# Patient Record
Sex: FEMALE | Race: OTHER | Employment: OTHER | URBAN - METROPOLITAN AREA
[De-identification: names, ages, dates, MRNs, and addresses within clinical notes are randomized per-mention and may not be internally consistent; named-entity substitution may affect disease eponyms.]

---

## 2017-01-10 RX ORDER — SULFAMETHOXAZOLE AND TRIMETHOPRIM 800; 160 MG/1; MG/1
1 TABLET ORAL
Start: 2017-01-11

## 2017-01-10 RX ORDER — HYDROCHLOROTHIAZIDE 12.5 MG/1
TABLET ORAL
Qty: 30 TABLET | Refills: 0 | OUTPATIENT
Start: 2017-01-10

## 2017-01-10 RX ORDER — ACYCLOVIR 400 MG/1
400 TABLET ORAL 2 TIMES DAILY
Start: 2017-01-10

## 2017-01-10 RX ORDER — FAMOTIDINE 20 MG/1
TABLET ORAL
Qty: 60 TABLET | Refills: 0 | OUTPATIENT
Start: 2017-01-10

## 2017-01-10 NOTE — TELEPHONE ENCOUNTER
Call placed to [facility] re refill request for acyclovir and bactrim that patient is under the care of medical oncologist Nicola Madison at [facility]. Refill requests should be directed to her please.  Confirms they will get in touch with Dr Zoya Guillermo

## 2017-01-11 ENCOUNTER — TELEPHONE (OUTPATIENT)
Dept: INTERNAL MEDICINE CLINIC | Facility: CLINIC | Age: 72
End: 2017-01-11

## 2017-01-11 NOTE — TELEPHONE ENCOUNTER
[facility] requesting refill.   Lyrica 50mg #60

## 2017-05-27 RX ORDER — FLUTICASONE PROPIONATE 50 MCG
SPRAY, SUSPENSION (ML) NASAL
Qty: 1 BOTTLE | Refills: 2 | Status: SHIPPED | OUTPATIENT
Start: 2017-05-27

## 2017-05-27 NOTE — TELEPHONE ENCOUNTER
Refill Protocol Appointment Criteria: Refilled per protocol    · Appointment scheduled in the past 12 months or in the next 3 months  Recent Visits       Provider Department Primary Dx    8 months ago Cristal Ambrosio MD [facility], [facility], [address] [facility] [place]

## 2017-06-27 NOTE — TELEPHONE ENCOUNTER
Refill Protocol Appointment Criteria: Refill protocol failed because the patient did not meet the protocol criteria.  Please advise in regards to refill request     · Appointment scheduled in the past 6 months or in the next 3 months  Recent Outpatient Visi

## 2017-07-07 RX ORDER — LISINOPRIL 20 MG/1
TABLET ORAL
Qty: 30 TABLET | Refills: 0 | Status: SHIPPED | OUTPATIENT
Start: 2017-07-07

## 2017-11-06 ENCOUNTER — MED REC SCAN ONLY (OUTPATIENT)
Dept: HEMATOLOGY/ONCOLOGY | Facility: HOSPITAL | Age: 72
End: 2017-11-06

## 2018-11-05 NOTE — LETTER
AUTHORIZATION FOR SURGICAL OPERATION OR OTHER PROCEDURE    1. I hereby authorize Dr. Alex Behar and the [facility] staff assigned to my case to perform the following operation and/or procedure at the [facility]:    Right subacromial corticosteroid injection     2.  My physician has explained the nature and purpose of the operation or other procedure, possible alternative methods of treatment, the risks involved, and the possibility of complication to me.  I acknowledge that no guarantee has been made as to the result that may be obtained.  3.  I recognize that, during the course of this operation, or other procedure, unforseen conditions may necessitate additional or different procedure than those listed above.  I, therefore, further authorize and request that the above named physician, his/her physician assistants or designees perform such procedures as are, in his/her professional opinion, necessary and desirable.  4.  Any tissue or organs removed in the operation or other procedure may be disposed of by and at the discretion of the [facility] staff and [facility].  5.  I understand that in the event of a medical emergency, I will be transported by local paramedics to [facility] or other hospital emergency department.  6.  I certify that I have read and fully understand the above consent to operation and/or other procedure.    7.  I acknowledge that my physician has explained sedation/analgesia administration to me including the risks and benefits.  I consent to the administration of sedation/analgesia as may be necessary or desirable in the judgement of my physician.    Witness signature: ___________________________________________________ Date:  ______/______/_____                    Time:  ________ A.M.  P.M.       Patient Name:  Lizbeth Matute  6/4/1945  [id number]         Patient signature:   ___________________________________________________                 Statement of Physician  My signature below affirms that prior to the time of the procedure, I have explained to the patient and/or his/her guardian, the risks and benefits involved in the proposed treatment and any reasonable alternative to the proposed treatment.  I have also explained the risks and benefits involved in the refusal of the proposed treatment and have answered the patient's questions.                        Date:  ______/______/_______  Provider                      Signature:  __________________________________________________________       Time:  ___________ AAMANDEEP CHAU        chlorhexidine chlorhexidine chlorhexidine chlorhexidine

## 2018-11-27 ENCOUNTER — HOSPITAL ENCOUNTER (EMERGENCY)
Facility: HOSPITAL | Age: 73
Discharge: HOME | End: 2018-11-27
Payer: MEDICARE

## 2018-11-27 ENCOUNTER — APPOINTMENT (OUTPATIENT)
Dept: CT IMAGING | Facility: HOSPITAL | Age: 73
End: 2018-11-27
Attending: NURSE PRACTITIONER
Payer: MEDICARE

## 2018-11-27 VITALS
BODY MASS INDEX: 23.41 KG/M2 | SYSTOLIC BLOOD PRESSURE: 156 MMHG | WEIGHT: 124 LBS | HEIGHT: 61 IN | HEART RATE: 54 BPM | DIASTOLIC BLOOD PRESSURE: 60 MMHG | OXYGEN SATURATION: 100 % | RESPIRATION RATE: 18 BRPM

## 2018-11-27 DIAGNOSIS — M54.89 BACK PAIN WITHOUT SCIATICA: Primary | ICD-10-CM

## 2018-11-27 PROCEDURE — 81001 URINALYSIS AUTO W/SCOPE: CPT | Performed by: NURSE PRACTITIONER

## 2018-11-27 PROCEDURE — 99285 EMERGENCY DEPT VISIT HI MDM: CPT

## 2018-11-27 PROCEDURE — 74176 CT ABD & PELVIS W/O CONTRAST: CPT | Performed by: NURSE PRACTITIONER

## 2018-11-27 RX ORDER — TRAMADOL HYDROCHLORIDE 50 MG/1
TABLET ORAL EVERY 4 HOURS PRN
Qty: 10 TABLET | Refills: 0 | Status: SHIPPED | OUTPATIENT
Start: 2018-11-27 | End: 2018-12-04

## 2018-11-27 RX ORDER — TRAMADOL HYDROCHLORIDE 50 MG/1
50 TABLET ORAL ONCE
Status: COMPLETED | OUTPATIENT
Start: 2018-11-27 | End: 2018-11-27

## 2018-11-27 NOTE — ED PROVIDER NOTES
Patient Seen in: [facility] AND [facility] Emergency Department    History   Patient presents with:  Back Pain (musculoskeletal)    Stated Complaint: back pain     HPI    Chief complaint: Back pain    History of present illness:   The patient complains of back pain mg by mouth 2 (two) times daily. Sulfamethoxazole-TMP -160 MG Oral Tab per tablet,  Take 1 tablet by mouth 3 (three) times a week.    Ondansetron HCl (ZOFRAN) 8 MG tablet,  Take 1 tablet (8 mg total) by mouth every 8 (eight) hours as needed for Naus 23.43 kg/m²     PULSE % on RA     General: Patient appears in mild distress, slow to transition  Neck: Supple, full range of motion, no midline bony tenderness  Abdomen: Soft nontender nondistended, no mass or prominent aortic pulsation  Back: NO Ten provider.           Present on Admission:  **None**

## 2018-12-28 ENCOUNTER — APPOINTMENT (OUTPATIENT)
Dept: GENERAL RADIOLOGY | Facility: HOSPITAL | Age: 73
End: 2018-12-28
Attending: PHYSICIAN ASSISTANT
Payer: MEDICARE

## 2018-12-28 ENCOUNTER — HOSPITAL ENCOUNTER (EMERGENCY)
Facility: HOSPITAL | Age: 73
Discharge: HOME | End: 2018-12-28
Attending: PHYSICIAN ASSISTANT
Payer: MEDICARE

## 2018-12-28 VITALS
RESPIRATION RATE: 14 BRPM | HEART RATE: 61 BPM | DIASTOLIC BLOOD PRESSURE: 51 MMHG | TEMPERATURE: 98 F | OXYGEN SATURATION: 98 % | SYSTOLIC BLOOD PRESSURE: 132 MMHG

## 2018-12-28 DIAGNOSIS — S62.654A CLOSED NONDISPLACED FRACTURE OF MIDDLE PHALANX OF RIGHT RING FINGER, INITIAL ENCOUNTER: Primary | ICD-10-CM

## 2018-12-28 PROCEDURE — 26720 TREAT FINGER FRACTURE EACH: CPT

## 2018-12-28 PROCEDURE — 99283 EMERGENCY DEPT VISIT LOW MDM: CPT

## 2018-12-28 PROCEDURE — 73130 X-RAY EXAM OF HAND: CPT | Performed by: PHYSICIAN ASSISTANT

## 2018-12-29 NOTE — ED PROVIDER NOTES
Patient Seen in: [facility] AND [facility] Emergency Department    History   Patient presents with:  Fall (musculoskeletal, neurologic)    Stated Complaint: fall, finger injury     HPI    HPI: Pinky Romo is a 68year old female who presents with chief compl 1 tablet by mouth 3 (three) times a week. Ondansetron HCl (ZOFRAN) 8 MG tablet,  Take 1 tablet (8 mg total) by mouth every 8 (eight) hours as needed for Nausea. Clopidogrel Bisulfate 75 MG Oral Tab,  Take 75 mg by mouth daily.    Multiple Vitamins- well-nourished. Mild discomfort. Psychological: Alert, No abnormalities of mood, affect. Head: Normocephalic/atraumatic. Nontender. Eyes: Pupils are equal round reactive to light. Conjunctiva are within normal limits. ENT: Oropharynx is clear.   Neck (gsf=[id number])    Result Date: 12/28/2018  CONCLUSION:  1. Nondisplaced fracture dorsal medial middle phalanx at PIP joint of ring finger. 2. Mild degenerative change of the triscaphe joint and DIP  joint small finger.  3. Calcium prior phosphate dihydrate dep

## 2020-02-05 ENCOUNTER — TELEPHONE (OUTPATIENT)
Dept: GASTROENTEROLOGY | Facility: CLINIC | Age: 75
End: 2020-02-05

## 2020-02-05 NOTE — TELEPHONE ENCOUNTER
----- Message from Cori Burt RN sent at 2/18/2016  1:44 PM CST -----  Regarding: colon recall  Recall colon for 5 years per PL.  Colon done 3/5/15

## 2020-06-27 ENCOUNTER — HOSPITAL ENCOUNTER (OUTPATIENT)
Age: 75
Discharge: HOME | End: 2020-06-27
Attending: EMERGENCY MEDICINE
Payer: MEDICARE

## 2020-06-27 VITALS
RESPIRATION RATE: 20 BRPM | SYSTOLIC BLOOD PRESSURE: 150 MMHG | HEART RATE: 66 BPM | TEMPERATURE: 98 F | OXYGEN SATURATION: 98 % | DIASTOLIC BLOOD PRESSURE: 54 MMHG

## 2020-06-27 DIAGNOSIS — M54.42 ACUTE BILATERAL LOW BACK PAIN WITH LEFT-SIDED SCIATICA: Primary | ICD-10-CM

## 2020-06-27 PROCEDURE — 99214 OFFICE O/P EST MOD 30 MIN: CPT

## 2020-06-27 PROCEDURE — 99213 OFFICE O/P EST LOW 20 MIN: CPT

## 2020-06-27 RX ORDER — METHYLPREDNISOLONE 4 MG/1
TABLET ORAL
Qty: 1 PACKAGE | Refills: 0 | Status: SHIPPED | OUTPATIENT
Start: 2020-06-27 | End: 2020-10-28

## 2020-06-27 NOTE — ED PROVIDER NOTES
Patient Seen in: [address]      History   Patient presents with:  Back Pain    Stated Complaint: left leg pain    HPI    The patient is a 80-year-old female with past history of hypertension, previous back problems who p Alcohol/week: 0.0 standard drinks    Drug use: No             Review of Systems    Positive for stated complaint: left leg pain  Other systems are as noted in HPI. Constitutional and vital signs reviewed.       All other systems reviewed and negative exc Abrahan Ramirez MD  [address] [facility]  [phone]      For further back evaluation.   Call Monday for an appointment          Medications Prescribed:  Current Discharge Medication List    START taking these medications    methylPRE

## 2020-06-27 NOTE — ED INITIAL ASSESSMENT (HPI)
Left lower back pain for 3 weeks radiating down left buttocks and down left leg. Denies trauma. Daughter with patient states she lives alone and falls frequently. Intermittent dizziness. No dizziness now. No chest pain or SOB.

## 2020-10-28 ENCOUNTER — OFFICE VISIT (OUTPATIENT)
Dept: NEUROLOGY | Facility: CLINIC | Age: 75
End: 2020-10-28
Payer: MEDICARE

## 2020-10-28 VITALS — WEIGHT: 124 LBS | HEIGHT: 61 IN | BODY MASS INDEX: 23.41 KG/M2

## 2020-10-28 DIAGNOSIS — M47.816 FACET SYNDROME, LUMBAR: ICD-10-CM

## 2020-10-28 DIAGNOSIS — M47.816 LUMBAR SPONDYLOSIS: ICD-10-CM

## 2020-10-28 DIAGNOSIS — I10 ESSENTIAL HYPERTENSION: ICD-10-CM

## 2020-10-28 DIAGNOSIS — E78.5 HYPERLIPIDEMIA, UNSPECIFIED HYPERLIPIDEMIA TYPE: ICD-10-CM

## 2020-10-28 DIAGNOSIS — M54.59 LUMBAR TRIGGER POINT SYNDROME: ICD-10-CM

## 2020-10-28 DIAGNOSIS — M48.061 LUMBAR FORAMINAL STENOSIS: ICD-10-CM

## 2020-10-28 DIAGNOSIS — M54.59 MECHANICAL LOW BACK PAIN: ICD-10-CM

## 2020-10-28 DIAGNOSIS — M48.062 SPINAL STENOSIS OF LUMBAR REGION WITH NEUROGENIC CLAUDICATION: ICD-10-CM

## 2020-10-28 DIAGNOSIS — M79.10 MYALGIA: Primary | ICD-10-CM

## 2020-10-28 DIAGNOSIS — M51.16 LUMBAR DISC HERNIATION WITH RADICULOPATHY: ICD-10-CM

## 2020-10-28 DIAGNOSIS — M51.26 BULGE OF LUMBAR DISC WITHOUT MYELOPATHY: ICD-10-CM

## 2020-10-28 DIAGNOSIS — M51.37 DDD (DEGENERATIVE DISC DISEASE), LUMBOSACRAL: ICD-10-CM

## 2020-10-28 PROCEDURE — 99204 OFFICE O/P NEW MOD 45 MIN: CPT | Performed by: PHYSICAL MEDICINE & REHABILITATION

## 2020-10-28 RX ORDER — FAMOTIDINE 20 MG/1
TABLET ORAL
COMMUNITY
Start: 2020-03-06

## 2020-10-28 RX ORDER — CINACALCET 30 MG/1
TABLET, FILM COATED ORAL
COMMUNITY
Start: 2020-07-05

## 2020-10-28 RX ORDER — DONEPEZIL HYDROCHLORIDE 10 MG/1
10 TABLET, FILM COATED ORAL DAILY
COMMUNITY
Start: 2020-09-04

## 2020-10-28 RX ORDER — LORAZEPAM 0.5 MG/1
TABLET ORAL
COMMUNITY
Start: 2020-02-18 | End: 2020-10-28

## 2020-10-28 RX ORDER — PREGABALIN 100 MG/1
100 CAPSULE ORAL 2 TIMES DAILY
Qty: 60 CAPSULE | Refills: 1 | Status: SHIPPED | OUTPATIENT
Start: 2020-10-28 | End: 2021-08-12

## 2020-10-28 NOTE — PATIENT INSTRUCTIONS
1) My office will call you to schedule the LEFT L4 and LEFT L5 TFESI under local anesthesia once the procedure is approved by your insurance carrier.     2) Increase Lyrica to 100 mg twice per day  3) Start formal physical therapy at [facility]

## 2020-10-28 NOTE — H&P
Adi Mcclellan 98  [address] [facility] H&P    Requesting Physician: [facility]    Chief Complaint (Reason for Visit):  Patient presents with:  Low Back Pain: For the past 6 months the patient has had left side low back pain th (Other[Other]) Paternal Grandfather    • Cancer Cousin         breast cancer       SOCIAL HISTORY:   Social History    Occupational History      Not on file    Tobacco Use      Smoking status: Never Smoker      Smokeless tobacco: Never Used    Substance an ALLERGIES:     Latex                   RASH      REVIEW OF SYSTEMS:   Review of Systems   Constitutional: Negative. HENT: Negative. Eyes: Negative. Respiratory: Negative. Cardiovascular: Negative. Gastrointestinal: Negative.     Federica symptoms  Slump test: negative for pain symptoms or radicular pain symptoms        Gait:  Antalgic and poor balance leaning towards the right due to left-sided leg pain    Data  No visits with results within 6 Month(s) from this visit.    Latest known visit at L4-L5 compressing the left L4 nerve roots. I am recommending a left L4 and left L5 transforaminal epidural steroid injection under local anesthesia. I have also recommended increasing her Lyrica to 100 mg twice per day.   She should begin formal physic

## 2020-10-29 ENCOUNTER — TELEPHONE (OUTPATIENT)
Dept: NEUROLOGY | Facility: CLINIC | Age: 75
End: 2020-10-29

## 2020-10-29 NOTE — TELEPHONE ENCOUNTER
Patient has been scheduled for a LEFT L4 and LEFt L5 TFESI under local anesthesia on 11/02/20 at the [facility]. Medications and allergies reviewed.  Patient informed to hold aspirins, nsaids, blood thinners, multivitamins, vitamin E and fish oils 3-7 days prior

## 2020-10-29 NOTE — TELEPHONE ENCOUNTER
Medicare Online for insurance coverage of LEFT L4 and LEFt L5 TFESI cpt codes 68962-[place], 71697-[place], 96250. Insurance was verified and procedure is a covered benefit. Authorization is not required. Will inform Nursing.

## 2020-11-16 ENCOUNTER — OFFICE VISIT (OUTPATIENT)
Dept: SURGERY | Facility: CLINIC | Age: 75
End: 2020-11-16

## 2020-11-16 DIAGNOSIS — M51.16 LUMBAR DISC HERNIATION WITH RADICULOPATHY: ICD-10-CM

## 2020-11-16 DIAGNOSIS — M54.59 MECHANICAL LOW BACK PAIN: ICD-10-CM

## 2020-11-16 DIAGNOSIS — M48.062 SPINAL STENOSIS OF LUMBAR REGION WITH NEUROGENIC CLAUDICATION: ICD-10-CM

## 2020-11-16 DIAGNOSIS — M51.26 BULGE OF LUMBAR DISC WITHOUT MYELOPATHY: ICD-10-CM

## 2020-11-16 DIAGNOSIS — M47.816 LUMBAR SPONDYLOSIS: ICD-10-CM

## 2020-11-16 DIAGNOSIS — M48.061 LUMBAR FORAMINAL STENOSIS: ICD-10-CM

## 2020-11-16 DIAGNOSIS — M51.37 DDD (DEGENERATIVE DISC DISEASE), LUMBOSACRAL: Primary | ICD-10-CM

## 2020-11-16 PROCEDURE — 64483 NJX AA&/STRD TFRM EPI L/S 1: CPT | Performed by: PHYSICAL MEDICINE & REHABILITATION

## 2020-11-16 PROCEDURE — 64484 NJX AA&/STRD TFRM EPI L/S EA: CPT | Performed by: PHYSICAL MEDICINE & REHABILITATION

## 2020-11-16 NOTE — PROCEDURES
[address].    2-LEVEL LUMBAR TRANSFORAMINAL   NAME:  Niko Boucher    MR #:    [id number] :  1945     PHYSICIAN:  Clarita Luis        Operative Report    DATE OF PROCEDURE: 2020   PREOPERATIVE DIAGNOSES: 1. DDD position on the left L4 and left L5 pedicles. At this point in time, Omnipaque-240 contrast was used to obtain a good epidurogram indicating correct needle placement at each level. Then, aspiration was performed. No blood, fluid, or air was aspirated.

## 2020-11-19 ENCOUNTER — TELEPHONE (OUTPATIENT)
Dept: NEUROLOGY | Facility: CLINIC | Age: 75
End: 2020-11-19

## 2020-11-19 NOTE — TELEPHONE ENCOUNTER
Left patient a detailed message on patient's voicemail asking how she is feeling after injection and asked her to call back if she is not doing well.

## 2020-11-24 NOTE — TELEPHONE ENCOUNTER
Patient calling with condition update after Left L4 and L5 TFESI on 11/16/20. Patient states since Saturday her left leg pain increased to 9/10. Patient states it hurts worse now than prior to the injection.  She had about 50% improvement in the left low ba

## 2020-11-25 RX ORDER — TRAMADOL HYDROCHLORIDE 50 MG/1
50 TABLET ORAL EVERY 6 HOURS PRN
Qty: 60 TABLET | Refills: 0 | Status: SHIPPED | OUTPATIENT
Start: 2020-11-25 | End: 2021-01-11

## 2020-11-26 NOTE — TELEPHONE ENCOUNTER
Called patient and daughter Alcon Patel) to discuss Lizbeth's ongoing pain. Left voicemail for both and recommended to call us back.

## 2020-11-30 NOTE — TELEPHONE ENCOUNTER
Patient daughter requesting a call back to discuss ,Mother's condition post injection.  Please call daughter [place] Face- [phone]

## 2020-12-14 ENCOUNTER — TELEPHONE (OUTPATIENT)
Dept: NEUROLOGY | Facility: CLINIC | Age: 75
End: 2020-12-14

## 2020-12-14 ENCOUNTER — OFFICE VISIT (OUTPATIENT)
Dept: NEUROLOGY | Facility: CLINIC | Age: 75
End: 2020-12-14
Payer: MEDICARE

## 2020-12-14 ENCOUNTER — HOSPITAL ENCOUNTER (OUTPATIENT)
Dept: GENERAL RADIOLOGY | Facility: HOSPITAL | Age: 75
Discharge: HOME | End: 2020-12-14
Attending: PHYSICAL MEDICINE & REHABILITATION
Payer: MEDICARE

## 2020-12-14 VITALS — HEIGHT: 59 IN | BODY MASS INDEX: 26.21 KG/M2 | WEIGHT: 130 LBS

## 2020-12-14 DIAGNOSIS — I10 ESSENTIAL HYPERTENSION: ICD-10-CM

## 2020-12-14 DIAGNOSIS — M47.816 LUMBAR SPONDYLOSIS: ICD-10-CM

## 2020-12-14 DIAGNOSIS — M47.816 FACET SYNDROME, LUMBAR: ICD-10-CM

## 2020-12-14 DIAGNOSIS — M51.37 DDD (DEGENERATIVE DISC DISEASE), LUMBOSACRAL: ICD-10-CM

## 2020-12-14 DIAGNOSIS — M51.16 LUMBAR DISC HERNIATION WITH RADICULOPATHY: ICD-10-CM

## 2020-12-14 DIAGNOSIS — M79.10 MYALGIA: Primary | ICD-10-CM

## 2020-12-14 DIAGNOSIS — E78.5 HYPERLIPIDEMIA, UNSPECIFIED HYPERLIPIDEMIA TYPE: ICD-10-CM

## 2020-12-14 DIAGNOSIS — M25.551 RIGHT HIP PAIN: ICD-10-CM

## 2020-12-14 DIAGNOSIS — M16.11 OSTEOARTHRITIS OF RIGHT HIP, UNSPECIFIED OSTEOARTHRITIS TYPE: ICD-10-CM

## 2020-12-14 DIAGNOSIS — M54.59 MECHANICAL LOW BACK PAIN: ICD-10-CM

## 2020-12-14 DIAGNOSIS — M48.061 LUMBAR FORAMINAL STENOSIS: ICD-10-CM

## 2020-12-14 DIAGNOSIS — M54.59 LUMBAR TRIGGER POINT SYNDROME: ICD-10-CM

## 2020-12-14 DIAGNOSIS — M48.062 SPINAL STENOSIS OF LUMBAR REGION WITH NEUROGENIC CLAUDICATION: ICD-10-CM

## 2020-12-14 DIAGNOSIS — M51.26 BULGE OF LUMBAR DISC WITHOUT MYELOPATHY: ICD-10-CM

## 2020-12-14 DIAGNOSIS — M79.10 MYALGIA: ICD-10-CM

## 2020-12-14 PROCEDURE — 99214 OFFICE O/P EST MOD 30 MIN: CPT | Performed by: PHYSICAL MEDICINE & REHABILITATION

## 2020-12-14 PROCEDURE — 73522 X-RAY EXAM HIPS BI 3-4 VIEWS: CPT | Performed by: PHYSICAL MEDICINE & REHABILITATION

## 2020-12-14 RX ORDER — TRAMADOL HYDROCHLORIDE 50 MG/1
100 TABLET ORAL EVERY 8 HOURS PRN
Qty: 120 TABLET | Refills: 0 | Status: SHIPPED | OUTPATIENT
Start: 2020-12-14 | End: 2021-01-11

## 2020-12-14 NOTE — PATIENT INSTRUCTIONS
1) My office will call you to schedule the Caudal epidural steroid injection under local anesthesia once the procedure is approved by your insurance carrier.     2) Get XR of the hip and pelvis today on your way out to evaluate the right groin pain  3) Star

## 2020-12-14 NOTE — TELEPHONE ENCOUNTER
Per Medicare Guidelines -no authorization is required for Caudal ROSELIA.      Status: Approved-no authorization required per health plan    Clinical Staff can proceed with scheduling Caudal ROSELIA CPT 79649 dx:M47.816 at [facility]

## 2020-12-15 NOTE — TELEPHONE ENCOUNTER
Patient has been scheduled for a Caudal ROSELIA under local anesthesia on 12/18/20 at the [facility]. Medications and allergies reviewed.  Patient informed we will need verbal or written authorization from patients Primary Care Physician/Cardiologist to hold blood th

## 2020-12-15 NOTE — PROGRESS NOTES
[address]  Progress Note    CHIEF COMPLAINT:  Patient presents with:  Leg Pain: LOV: 11/16/2020. Follow up for left leg pain. Taking tramadol for pain. Pain 7/10.  Patient numbness present all the way up leg Problem Relation Age of Onset   • Cancer Mother         uterine cancer   • Arthritis Father         Rheumatoid   • Other (Other[Other]) Maternal Grandmother    • Other (Other[Other]) Maternal Grandfather    • Other (Other[Other]) Paternal Grandmother DIRECTED 5 mL 0   • acetaminophen (TYLENOL) 325 MG Oral Tab Take 325 mg by mouth every 6 (six) hours as needed for Pain. ALLERGIES:     Latex                   RASH    REVIEW OF SYSTEMS:   Review of Systems   Constitutional: Negative.     HENT: Leandrew Overall pain  Straight leg raise: negative for radicular pain symptoms  Slump test: negative for pain symptoms or radicular pain symptoms        Gait:  Antalgic    Data  No visits with results within 6 Month(s) from this visit.    Latest known visit with results is Degenerative changes in the spine and pubic symphysis. Pubic symphysis chondrocalcinosis. SOFT TISSUES: Negative. No visible soft tissue swelling. EFFUSION: None visible. OTHER: Negative. Impression: CONCLUSION:   1.  Mild-to-moderat with neurogenic claudication  Facet syndrome, lumbar  Lumbar disc herniation with radiculopathy  Essential hypertension  DDD (degenerative disc disease), lumbosacral  Lumbar foraminal stenosis  Hyperlipidemia, unspecified hyperlipidemia type  Right hip leo

## 2020-12-18 ENCOUNTER — OFFICE VISIT (OUTPATIENT)
Dept: SURGERY | Facility: CLINIC | Age: 75
End: 2020-12-18

## 2020-12-18 DIAGNOSIS — M54.59 MECHANICAL LOW BACK PAIN: ICD-10-CM

## 2020-12-18 DIAGNOSIS — M48.062 SPINAL STENOSIS OF LUMBAR REGION WITH NEUROGENIC CLAUDICATION: ICD-10-CM

## 2020-12-18 DIAGNOSIS — M51.26 BULGE OF LUMBAR DISC WITHOUT MYELOPATHY: ICD-10-CM

## 2020-12-18 DIAGNOSIS — M48.061 LUMBAR FORAMINAL STENOSIS: ICD-10-CM

## 2020-12-18 DIAGNOSIS — M51.37 DDD (DEGENERATIVE DISC DISEASE), LUMBOSACRAL: Primary | ICD-10-CM

## 2020-12-18 DIAGNOSIS — M51.16 LUMBAR DISC HERNIATION WITH RADICULOPATHY: ICD-10-CM

## 2020-12-18 DIAGNOSIS — M47.816 LUMBAR SPONDYLOSIS: ICD-10-CM

## 2020-12-18 PROCEDURE — 62323 NJX INTERLAMINAR LMBR/SAC: CPT | Performed by: PHYSICAL MEDICINE & REHABILITATION

## 2020-12-18 NOTE — PROCEDURES
Dillon Mann.    Caudal Epidural Steroid Injection  NAME:  Kin Marina    MR #:    [id number] :  1945     PHYSICIAN:  Dafne Tomlinson        Operative Report    DATE OF PROCEDURE: 2020   PREOPERATIVE DIAGNOSES: 1.  D and Rehabilitation/Sports [address]

## 2021-01-11 ENCOUNTER — OFFICE VISIT (OUTPATIENT)
Dept: NEUROLOGY | Facility: CLINIC | Age: 76
End: 2021-01-11
Payer: MEDICARE

## 2021-01-11 VITALS — BODY MASS INDEX: 23.98 KG/M2 | HEIGHT: 61 IN | WEIGHT: 127 LBS

## 2021-01-11 DIAGNOSIS — M48.062 SPINAL STENOSIS OF LUMBAR REGION WITH NEUROGENIC CLAUDICATION: ICD-10-CM

## 2021-01-11 DIAGNOSIS — M51.26 BULGE OF LUMBAR DISC WITHOUT MYELOPATHY: ICD-10-CM

## 2021-01-11 DIAGNOSIS — M79.10 MYALGIA: Primary | ICD-10-CM

## 2021-01-11 DIAGNOSIS — M25.551 RIGHT HIP PAIN: ICD-10-CM

## 2021-01-11 DIAGNOSIS — I10 ESSENTIAL HYPERTENSION: ICD-10-CM

## 2021-01-11 DIAGNOSIS — M47.816 LUMBAR SPONDYLOSIS: ICD-10-CM

## 2021-01-11 DIAGNOSIS — M16.11 OSTEOARTHRITIS OF RIGHT HIP, UNSPECIFIED OSTEOARTHRITIS TYPE: ICD-10-CM

## 2021-01-11 DIAGNOSIS — M47.816 FACET SYNDROME, LUMBAR: ICD-10-CM

## 2021-01-11 DIAGNOSIS — E78.5 HYPERLIPIDEMIA, UNSPECIFIED HYPERLIPIDEMIA TYPE: ICD-10-CM

## 2021-01-11 DIAGNOSIS — M51.37 DDD (DEGENERATIVE DISC DISEASE), LUMBOSACRAL: ICD-10-CM

## 2021-01-11 DIAGNOSIS — M51.16 LUMBAR DISC HERNIATION WITH RADICULOPATHY: ICD-10-CM

## 2021-01-11 DIAGNOSIS — M54.59 LUMBAR TRIGGER POINT SYNDROME: ICD-10-CM

## 2021-01-11 DIAGNOSIS — M48.061 LUMBAR FORAMINAL STENOSIS: ICD-10-CM

## 2021-01-11 DIAGNOSIS — M54.59 MECHANICAL LOW BACK PAIN: ICD-10-CM

## 2021-01-11 PROCEDURE — 99214 OFFICE O/P EST MOD 30 MIN: CPT | Performed by: PHYSICAL MEDICINE & REHABILITATION

## 2021-01-11 RX ORDER — SERTRALINE HYDROCHLORIDE 100 MG/1
100 TABLET, FILM COATED ORAL NIGHTLY
COMMUNITY
End: 2021-10-13

## 2021-01-11 NOTE — PATIENT INSTRUCTIONS
1) Start physical therapy as soon as possible  2) Make an appointment with Dr. Beena Vargas of [facility] (Neurosurgery) to discuss surgical potions if physical therapy fails.   3) Make an appointment with your primary doctor and your oncologist to discuss the

## 2021-01-11 NOTE — PROGRESS NOTES
[address]  Progress Note    CHIEF COMPLAINT:  Patient presents with:  Low Back Pain: LOV 12/18/20 f/u for Caudal ROSELIA. Patient states very small relief from injection.  Left sided low back pain 6/10 radiating Laterality Date   • BRAIN SURGERY  10/31/2016    Right frontal craniotomy for tumor resection   • CHOLECYSTECTOMY  2012   • COLONOSCOPY      at [facility] about 8 years ago   • [address] Right 10/31/2016    Performed by Herrera Soto MD at TAKE 1 TABLET BY MOUTH ONCE A WEEK BEFORE BREAKFAST 4 tablet 2   • acetaminophen (TYLENOL) 325 MG Oral Tab Take 325 mg by mouth every 6 (six) hours as needed for Pain.      • pregabalin (LYRICA) 100 MG Oral Cap Take 1 capsule (100 mg total) by mouth 2 (two) Intact to light touch in all dermatomes of the lower extremities except decreased sensation to light touch over the right L5 and Left S1 dermatomes  Reflexes: 1/4 at L4 and S1  Facet Loading: no specific facet pain  Straight leg raise: negative for radicul Mild-to-moderate bilateral hip osteoarthritis. Acetabular chondrocalcinosis compatible with calcium pyrophosphate dihydrate deposition. Old left anterior inferior iliac spine avulsion fracture no suspicious bone lesion or acute fracture.     Derotha Leaks strengthening and stretching as she is complaining of discomfort in that area. I will follow-up with Booker Joy as needed going forward after she sees neurosurgery.   If she decides to not have any surgical interventions, then I will see her sooner and we can t

## 2021-01-21 ENCOUNTER — OFFICE VISIT (OUTPATIENT)
Dept: PHYSICAL THERAPY | Facility: HOSPITAL | Age: 76
End: 2021-01-21
Attending: PHYSICAL MEDICINE & REHABILITATION
Payer: MEDICARE

## 2021-01-21 DIAGNOSIS — M47.816 LUMBAR SPONDYLOSIS: ICD-10-CM

## 2021-01-21 DIAGNOSIS — M48.062 SPINAL STENOSIS OF LUMBAR REGION WITH NEUROGENIC CLAUDICATION: ICD-10-CM

## 2021-01-21 DIAGNOSIS — M54.59 MECHANICAL LOW BACK PAIN: ICD-10-CM

## 2021-01-21 DIAGNOSIS — M51.37 DDD (DEGENERATIVE DISC DISEASE), LUMBOSACRAL: ICD-10-CM

## 2021-01-21 DIAGNOSIS — M48.061 LUMBAR FORAMINAL STENOSIS: ICD-10-CM

## 2021-01-21 DIAGNOSIS — M79.10 MYALGIA: ICD-10-CM

## 2021-01-21 DIAGNOSIS — I10 ESSENTIAL HYPERTENSION: ICD-10-CM

## 2021-01-21 DIAGNOSIS — M16.11 OSTEOARTHRITIS OF RIGHT HIP, UNSPECIFIED OSTEOARTHRITIS TYPE: ICD-10-CM

## 2021-01-21 DIAGNOSIS — E78.5 HYPERLIPIDEMIA, UNSPECIFIED HYPERLIPIDEMIA TYPE: ICD-10-CM

## 2021-01-21 DIAGNOSIS — M47.816 FACET SYNDROME, LUMBAR: ICD-10-CM

## 2021-01-21 DIAGNOSIS — M54.59 LUMBAR TRIGGER POINT SYNDROME: ICD-10-CM

## 2021-01-21 DIAGNOSIS — M51.26 BULGE OF LUMBAR DISC WITHOUT MYELOPATHY: ICD-10-CM

## 2021-01-21 DIAGNOSIS — M51.16 LUMBAR DISC HERNIATION WITH RADICULOPATHY: ICD-10-CM

## 2021-01-21 DIAGNOSIS — M25.551 RIGHT HIP PAIN: ICD-10-CM

## 2021-01-21 PROCEDURE — 97110 THERAPEUTIC EXERCISES: CPT

## 2021-01-21 PROCEDURE — 97162 PT EVAL MOD COMPLEX 30 MIN: CPT

## 2021-01-22 NOTE — PROGRESS NOTES
SPINE EVALUATION:   Referring Physician: Dr. Antony Thomas  Diagnosis: Myalgia (M79.10)  Lumbar spondylosis (M47.816)  Mechanical low back pain (M54.5)  Essential hypertension (I10)  Right hip pain (M25.551)  Osteoarthritis of right hip, unspecified osteoarthriti Past medical history was reviewed with Stefano Costa.  Significant findings include   Past Medical History:   Diagnosis Date   • Arthritis     Rheumatoid Arthritis   • Fibromyalgia    • Hearing difficulty    • Hyperlipidemia    • Hypertension    • Osteoporosis limited 25%    Accessory motion: (+) L flick, restricted mid T/S and TL junction, L4-S1 flexion opening. Palpation: TTP along L long dorsal ligament and L unilateral PA L4-S1 L.      Strength: (* denotes performed with pain)  LE   Hip flexion (L2): R 4/5; Activities, Therapeutic Exercise and Home Exercise Program instruction    Education or treatment limitation: None  Rehab Potential:excellent    FOTO: 52/100    Patient/Family/Caregiver was advised of these findings, precautions, and treatment options and h

## 2021-01-26 ENCOUNTER — TELEPHONE (OUTPATIENT)
Dept: PHYSICAL THERAPY | Facility: HOSPITAL | Age: 76
End: 2021-01-26

## 2021-01-26 ENCOUNTER — APPOINTMENT (OUTPATIENT)
Dept: PHYSICAL THERAPY | Facility: HOSPITAL | Age: 76
End: 2021-01-26
Attending: PHYSICAL MEDICINE & REHABILITATION
Payer: MEDICARE

## 2021-01-28 ENCOUNTER — OFFICE VISIT (OUTPATIENT)
Dept: PHYSICAL THERAPY | Facility: HOSPITAL | Age: 76
End: 2021-01-28
Attending: PHYSICAL MEDICINE & REHABILITATION
Payer: MEDICARE

## 2021-01-28 PROCEDURE — 97140 MANUAL THERAPY 1/> REGIONS: CPT

## 2021-01-28 PROCEDURE — 97110 THERAPEUTIC EXERCISES: CPT

## 2021-01-28 NOTE — PROGRESS NOTES
Dx: Myalgia (M79.10)  Lumbar spondylosis (M47.816)  Mechanical low back pain (M54.5)  Essential hypertension (I10)  Right hip pain (M25.551)  Osteoarthritis of right hip, unspecified osteoarthritis type (M16.11)  DDD (degenerative disc disease), lumbosacra the night without waking due to pain. At least 20 min walking to be able to go grocery shopping without difficulty max 3/10 pain  Pt will be able tolerate standing up to 20 min to be able to perform chores at home and run errands with max 3/10 pain.

## 2021-02-01 ENCOUNTER — APPOINTMENT (OUTPATIENT)
Dept: PHYSICAL THERAPY | Facility: HOSPITAL | Age: 76
End: 2021-02-01
Attending: PHYSICAL MEDICINE & REHABILITATION
Payer: MEDICARE

## 2021-02-02 ENCOUNTER — OFFICE VISIT (OUTPATIENT)
Dept: PHYSICAL THERAPY | Facility: HOSPITAL | Age: 76
End: 2021-02-02
Attending: PHYSICAL MEDICINE & REHABILITATION
Payer: MEDICARE

## 2021-02-02 PROCEDURE — 97140 MANUAL THERAPY 1/> REGIONS: CPT

## 2021-02-02 PROCEDURE — 97110 THERAPEUTIC EXERCISES: CPT

## 2021-02-02 NOTE — PROGRESS NOTES
Dx: Myalgia (M79.10)  Lumbar spondylosis (M47.816)  Mechanical low back pain (M54.5)  Essential hypertension (I10)  Right hip pain (M25.551)  Osteoarthritis of right hip, unspecified osteoarthritis type (M16.11)  DDD (degenerative disc disease), lumbosacra manual therapy to address jt restrictions, progress core stabilization, and improve lumbopelvic mechanics. Date: 1/28/2021  TX#: 2/16 Date: 2/2/2021           TX#: 3/16 Date:                 TX#: 4/ Date:                 TX#: 5/ Date:    Tx#: 6/   MT  -L

## 2021-02-08 ENCOUNTER — OFFICE VISIT (OUTPATIENT)
Dept: PHYSICAL THERAPY | Facility: HOSPITAL | Age: 76
End: 2021-02-08
Attending: PHYSICAL MEDICINE & REHABILITATION
Payer: MEDICARE

## 2021-02-08 PROCEDURE — 97110 THERAPEUTIC EXERCISES: CPT

## 2021-02-08 PROCEDURE — 97140 MANUAL THERAPY 1/> REGIONS: CPT

## 2021-02-08 NOTE — PROGRESS NOTES
Dx: Myalgia (M79.10)  Lumbar spondylosis (M47.816)  Mechanical low back pain (M54.5)  Essential hypertension (I10)  Right hip pain (M25.551)  Osteoarthritis of right hip, unspecified osteoarthritis type (M16.11)  DDD (degenerative disc disease), lumbosacra 2/2/2021           TX#: 3/16 Date: 2/8/2021  TX#: 4/16 Date:                 TX#: 5/ Date: Tx#: 6/   MT  -L hip harmonics 4-way  -CPA T5-10 gr 3  -Lower lumbar distraction S/L L4-S1 bilat gr 3  -FMP seated lumbar flexion tissue technique.  MT  -R 2nd-4th

## 2021-02-12 ENCOUNTER — OFFICE VISIT (OUTPATIENT)
Dept: PHYSICAL THERAPY | Facility: HOSPITAL | Age: 76
End: 2021-02-12
Attending: PHYSICAL MEDICINE & REHABILITATION
Payer: MEDICARE

## 2021-02-12 PROCEDURE — 97140 MANUAL THERAPY 1/> REGIONS: CPT

## 2021-02-12 PROCEDURE — 97110 THERAPEUTIC EXERCISES: CPT

## 2021-02-12 NOTE — PROGRESS NOTES
Dx: Myalgia (M79.10)  Lumbar spondylosis (M47.816)  Mechanical low back pain (M54.5)  Essential hypertension (I10)  Right hip pain (M25.551)  Osteoarthritis of right hip, unspecified osteoarthritis type (M16.11)  DDD (degenerative disc disease), lumbosacra Date:   Tx#: 6/   MT  -L hip harmonics 4-way  -CPA T5-10 gr 3  -Lower lumbar distraction S/L L4-S1 bilat gr 3  -FMP seated lumbar flexion tissue technique.  MT  -R 2nd-4th costotransverse junction  -L hip harmonics 4-way  -CPA T5-10 gr 3  -Lower lumbar dist

## 2021-02-23 ENCOUNTER — OFFICE VISIT (OUTPATIENT)
Dept: PHYSICAL THERAPY | Facility: HOSPITAL | Age: 76
End: 2021-02-23
Attending: PHYSICAL MEDICINE & REHABILITATION
Payer: MEDICARE

## 2021-02-23 PROCEDURE — 97110 THERAPEUTIC EXERCISES: CPT

## 2021-02-23 PROCEDURE — 97140 MANUAL THERAPY 1/> REGIONS: CPT

## 2021-02-23 NOTE — PROGRESS NOTES
Dx: Myalgia (M79.10)  Lumbar spondylosis (M47.816)  Mechanical low back pain (M54.5)  Essential hypertension (I10)  Right hip pain (M25.551)  Osteoarthritis of right hip, unspecified osteoarthritis type (M16.11)  DDD (degenerative disc disease), lumbosacra harmonics 4-way  -CPA T5-10 gr 3  -Lower lumbar distraction S/L L4-S1 bilat gr 3  -FMP seated lumbar flexion tissue technique.  MT  -L hip harmonics 4-way  -CPA T5-10 gr 3  -Lower lumbar distraction S/L L4-S1 bilat gr 3  -FMP seated lumbar flexion tissue te

## 2021-02-26 ENCOUNTER — OFFICE VISIT (OUTPATIENT)
Dept: PHYSICAL THERAPY | Facility: HOSPITAL | Age: 76
End: 2021-02-26
Attending: PHYSICAL MEDICINE & REHABILITATION
Payer: MEDICARE

## 2021-02-26 PROCEDURE — 97140 MANUAL THERAPY 1/> REGIONS: CPT

## 2021-02-26 PROCEDURE — 97110 THERAPEUTIC EXERCISES: CPT

## 2021-02-26 NOTE — PROGRESS NOTES
Dx: Myalgia (M79.10)  Lumbar spondylosis (M47.816)  Mechanical low back pain (M54.5)  Essential hypertension (I10)  Right hip pain (M25.551)  Osteoarthritis of right hip, unspecified osteoarthritis type (M16.11)  DDD (degenerative disc disease), lumbosacra strengthening and increase neurodynamics.     Date: 2/12/2021           TX#: 5/16 Date: 2/23/2021  Tx#: 6/16 Date: 2/26/2021  Tx#: 7/16      MT  -L hip harmonics 4-way  -CPA T5-10 gr 3  -Lower lumbar distraction S/L L4-S1 bilat gr 3  -FMP seated lumbar flex

## 2021-03-03 DIAGNOSIS — Z23 NEED FOR VACCINATION: ICD-10-CM

## 2021-03-04 ENCOUNTER — OFFICE VISIT (OUTPATIENT)
Dept: PHYSICAL THERAPY | Facility: HOSPITAL | Age: 76
End: 2021-03-04
Attending: PHYSICAL MEDICINE & REHABILITATION
Payer: MEDICARE

## 2021-03-04 PROCEDURE — 97140 MANUAL THERAPY 1/> REGIONS: CPT

## 2021-03-04 PROCEDURE — 97110 THERAPEUTIC EXERCISES: CPT

## 2021-03-04 NOTE — PROGRESS NOTES
Dx: Myalgia (M79.10)  Lumbar spondylosis (M47.816)  Mechanical low back pain (M54.5)  Essential hypertension (I10)  Right hip pain (M25.551)  Osteoarthritis of right hip, unspecified osteoarthritis type (M16.11)  DDD (degenerative disc disease), lumbosacra 6/16 Date: 2/26/2021  Tx#: 7/16 Date: 3/5/2021  Tx#: 8/16       MT  -L hip harmonics 4-way  -CPA T5-10 gr 3  -Lower lumbar distraction S/L L4-S1 bilat gr 3  -FMP seated lumbar flexion tissue technique.  MT  -L hip harmonics 4-way  -CPA T5-10 gr 3  -Lower riley standing hip abd  HEP - added STS training                Charges: MT 1, EX 2 Total Timed Treatment: MT 12 min, EX 30 min  Total Treatment Time: 45 min

## 2021-03-09 ENCOUNTER — OFFICE VISIT (OUTPATIENT)
Dept: PHYSICAL THERAPY | Facility: HOSPITAL | Age: 76
End: 2021-03-09
Attending: PHYSICAL MEDICINE & REHABILITATION
Payer: MEDICARE

## 2021-03-09 PROCEDURE — 97110 THERAPEUTIC EXERCISES: CPT

## 2021-03-09 PROCEDURE — 97140 MANUAL THERAPY 1/> REGIONS: CPT

## 2021-03-09 NOTE — PROGRESS NOTES
Dx: Myalgia (M79.10)  Lumbar spondylosis (M47.816)  Mechanical low back pain (M54.5)  Essential hypertension (I10)  Right hip pain (M25.551)  Osteoarthritis of right hip, unspecified osteoarthritis type (M16.11)  DDD (degenerative disc disease), lumbosacra 8/16 Date: 3/9/2021  Tx#: 9/16    MT  -L hip harmonics 4-way  -CPA T5-10 gr 3  -Lower lumbar distraction S/L L4-S1 bilat gr 3  -FMP seated lumbar flexion tissue technique.  MT  -L hip harmonics 4-way  -CPA T5-10 gr 3  -Lower lumbar distraction S/L L4-S1 danelle Sciatic N glide x15 LLE  -Shuttle SL Squat 25 lbs x10, 30 lb x10, 35 x10 bilat  -Standing hip abd 3x10 YTB bilat    -STS 3x10 YTB knees  -Nu Step x6 min seat 5 arms 9 R5 EX  -Piriformis stretch LLE x1 min supine hooklying.  -lumbar gapping Sciatic N glide

## 2021-03-11 ENCOUNTER — OFFICE VISIT (OUTPATIENT)
Dept: PHYSICAL THERAPY | Facility: HOSPITAL | Age: 76
End: 2021-03-11
Attending: PHYSICAL MEDICINE & REHABILITATION
Payer: MEDICARE

## 2021-03-11 PROCEDURE — 97110 THERAPEUTIC EXERCISES: CPT

## 2021-03-11 PROCEDURE — 97140 MANUAL THERAPY 1/> REGIONS: CPT

## 2021-03-11 NOTE — PROGRESS NOTES
Dx: Myalgia (M79.10)  Lumbar spondylosis (M47.816)  Mechanical low back pain (M54.5)  Essential hypertension (I10)  Right hip pain (M25.551)  Osteoarthritis of right hip, unspecified osteoarthritis type (M16.11)  DDD (degenerative disc disease), lumbosacra 8/16 Date: 3/9/2021  Tx#: 9/16 Date: 3/11/2021  Tx#: 10/16   MT  -L hip harmonics 4-way  -CPA T5-10 gr 3  -Lower lumbar distraction S/L L4-S1 bilat gr 3  -FMP seated lumbar flexion tissue technique.  MT  -L hip harmonics 4-way  -CPA T5-10 gr 3  -Lower lumba -Bridging 3x10 RTB knees hold 3 seconds  -Rocker board FWD and SIDE x90 sec with single UE support   -Nu Step x6 min seat 5 arms 9 R5 EX  -Piriformis stretch LLE 3x30 sec supine hooklying.  -Segmental lumbar flexion x5  -lumbar gapping Sciatic N glide x1

## 2021-03-16 ENCOUNTER — APPOINTMENT (OUTPATIENT)
Dept: PHYSICAL THERAPY | Facility: HOSPITAL | Age: 76
End: 2021-03-16
Attending: PHYSICAL MEDICINE & REHABILITATION
Payer: MEDICARE

## 2021-03-18 ENCOUNTER — OFFICE VISIT (OUTPATIENT)
Dept: PHYSICAL THERAPY | Facility: HOSPITAL | Age: 76
End: 2021-03-18
Attending: PHYSICAL MEDICINE & REHABILITATION
Payer: MEDICARE

## 2021-03-18 PROCEDURE — 97140 MANUAL THERAPY 1/> REGIONS: CPT

## 2021-03-18 PROCEDURE — 97110 THERAPEUTIC EXERCISES: CPT

## 2021-03-26 ENCOUNTER — OFFICE VISIT (OUTPATIENT)
Dept: PHYSICAL THERAPY | Facility: HOSPITAL | Age: 76
End: 2021-03-26
Attending: PHYSICAL MEDICINE & REHABILITATION
Payer: MEDICARE

## 2021-03-26 PROCEDURE — 97140 MANUAL THERAPY 1/> REGIONS: CPT

## 2021-03-26 PROCEDURE — 97110 THERAPEUTIC EXERCISES: CPT

## 2021-03-29 NOTE — PROGRESS NOTES
Walkouts with plate 5 rounds 3 cycles  Seated 10 lb dead lifts 3x10  Glut SS YTB 3 sets  Seated T/S extension hands over head  Tennis ball release R rhomboids and lower lumbar paraspinals  Prone upper rib mobililzations gr 3    MT EX 2

## 2021-04-08 ENCOUNTER — OFFICE VISIT (OUTPATIENT)
Dept: PHYSICAL THERAPY | Facility: HOSPITAL | Age: 76
End: 2021-04-08
Attending: PHYSICAL MEDICINE & REHABILITATION
Payer: MEDICARE

## 2021-04-08 PROCEDURE — 97140 MANUAL THERAPY 1/> REGIONS: CPT

## 2021-04-08 PROCEDURE — 97110 THERAPEUTIC EXERCISES: CPT

## 2021-04-08 NOTE — PROGRESS NOTES
No new complaints.  States L leg is doing ok    Walkouts with #6 plate 5 rounds 3 cycles  Seated 10 lb dead lifts 3x10  Glut SS RTB 3 sets 10 steps out/10 steps in  Step ups with hip flexion 6 in 3x10 bilat  Nu Step R5 x8 min    MT EX 2

## 2021-04-22 ENCOUNTER — OFFICE VISIT (OUTPATIENT)
Dept: PHYSICAL THERAPY | Facility: HOSPITAL | Age: 76
End: 2021-04-22
Attending: PHYSICAL MEDICINE & REHABILITATION
Payer: MEDICARE

## 2021-04-22 PROCEDURE — 97110 THERAPEUTIC EXERCISES: CPT

## 2021-04-22 PROCEDURE — 97140 MANUAL THERAPY 1/> REGIONS: CPT

## 2021-04-22 NOTE — PROGRESS NOTES
DC VISIT      No new complaints.  States L leg is doing ok    Walkouts with #6 plate 5 rounds 3 cycles  Seated 10 lb dead lifts 3x10  Glut SS RTB 3 sets 10 steps out/10 steps in  Step ups with hip flexion 6 in 3x10 bilat  Nu Step R5 x8 min    MT EX 2

## 2021-08-12 ENCOUNTER — OFFICE VISIT (OUTPATIENT)
Dept: NEUROLOGY | Facility: CLINIC | Age: 76
End: 2021-08-12
Payer: MEDICARE

## 2021-08-12 VITALS
BODY MASS INDEX: 23.98 KG/M2 | WEIGHT: 127 LBS | HEART RATE: 62 BPM | HEIGHT: 61 IN | OXYGEN SATURATION: 96 % | SYSTOLIC BLOOD PRESSURE: 110 MMHG | DIASTOLIC BLOOD PRESSURE: 58 MMHG

## 2021-08-12 DIAGNOSIS — M51.26 BULGE OF LUMBAR DISC WITHOUT MYELOPATHY: ICD-10-CM

## 2021-08-12 DIAGNOSIS — E78.5 HYPERLIPIDEMIA, UNSPECIFIED HYPERLIPIDEMIA TYPE: ICD-10-CM

## 2021-08-12 DIAGNOSIS — M47.816 FACET SYNDROME, LUMBAR: ICD-10-CM

## 2021-08-12 DIAGNOSIS — M51.16 LUMBAR DISC HERNIATION WITH RADICULOPATHY: ICD-10-CM

## 2021-08-12 DIAGNOSIS — M54.59 MECHANICAL LOW BACK PAIN: ICD-10-CM

## 2021-08-12 DIAGNOSIS — M48.061 LUMBAR FORAMINAL STENOSIS: ICD-10-CM

## 2021-08-12 DIAGNOSIS — M51.37 DDD (DEGENERATIVE DISC DISEASE), LUMBOSACRAL: ICD-10-CM

## 2021-08-12 DIAGNOSIS — M48.062 SPINAL STENOSIS OF LUMBAR REGION WITH NEUROGENIC CLAUDICATION: ICD-10-CM

## 2021-08-12 DIAGNOSIS — M79.10 MYALGIA: Primary | ICD-10-CM

## 2021-08-12 DIAGNOSIS — M47.816 LUMBAR SPONDYLOSIS: ICD-10-CM

## 2021-08-12 DIAGNOSIS — M54.59 LUMBAR TRIGGER POINT SYNDROME: ICD-10-CM

## 2021-08-12 DIAGNOSIS — M25.551 RIGHT HIP PAIN: ICD-10-CM

## 2021-08-12 DIAGNOSIS — I10 ESSENTIAL HYPERTENSION: ICD-10-CM

## 2021-08-12 DIAGNOSIS — M16.11 OSTEOARTHRITIS OF RIGHT HIP, UNSPECIFIED OSTEOARTHRITIS TYPE: ICD-10-CM

## 2021-08-12 PROCEDURE — 99214 OFFICE O/P EST MOD 30 MIN: CPT | Performed by: PHYSICAL MEDICINE & REHABILITATION

## 2021-08-12 NOTE — PROGRESS NOTES
[address]  Progress Note    CHIEF COMPLAINT:  Patient presents with:  Low Back Pain: LOV: 1/11/2021 Bilateral low back pain that staretd 2 weeks ago again and nopw radiation to the left upper leg.  Patient s SURGICAL HISTORY      Bionic Eye       SOCIAL HISTORY:   Social History    Occupational History      Not on file    Tobacco Use      Smoking status: Never Smoker      Smokeless tobacco: Never Used    Substance and Sexual Activity      Alcohol use:  No not taking: Reported on 1/11/2021 ) 60 capsule 1       ALLERGIES:     Latex                   RASH    REVIEW OF SYSTEMS:   Review of Systems   Constitutional: Negative. HENT: Negative. Eyes: Negative. Respiratory: Negative.     Cardiovascular: Nega Final   • Nitrite Urine 11/27/2018 Negative  Negative Final   • Urobilinogen Urine 11/27/2018 <2.0  <2.0 Final   • Leukocyte Esterase Urine 11/27/2018 Trace* Negative Final   • Ascorbic Acid Urine 11/27/2018 Negative  Negative mg/dL Final   • WBC Urine 11/ arthropathy.     ASSESSMENT AND PLAN:  Neo gold is a pleasant 67-year-old female who presents for follow-up of her left-sided low back pain and lumbar radiculopathy. She has done very well with physical therapy and I am recommending she restart this.   I am a

## 2021-08-12 NOTE — PATIENT INSTRUCTIONS
1) restart physical therapy with Wil  2) Continue Tylenol 500-1000 mg every 6-8 hours as needed for pain. No more than 3000 mg daily.   3) Use Tramadol only for severe pain  4) Follow up with me in 2 months

## 2021-09-16 ENCOUNTER — OFFICE VISIT (OUTPATIENT)
Dept: PHYSICAL THERAPY | Facility: HOSPITAL | Age: 76
End: 2021-09-16
Attending: PHYSICAL MEDICINE & REHABILITATION
Payer: MEDICARE

## 2021-09-16 DIAGNOSIS — M48.062 SPINAL STENOSIS OF LUMBAR REGION WITH NEUROGENIC CLAUDICATION: ICD-10-CM

## 2021-09-16 DIAGNOSIS — I10 ESSENTIAL HYPERTENSION: ICD-10-CM

## 2021-09-16 DIAGNOSIS — M25.551 RIGHT HIP PAIN: ICD-10-CM

## 2021-09-16 DIAGNOSIS — M16.11 OSTEOARTHRITIS OF RIGHT HIP, UNSPECIFIED OSTEOARTHRITIS TYPE: ICD-10-CM

## 2021-09-16 DIAGNOSIS — M48.061 LUMBAR FORAMINAL STENOSIS: ICD-10-CM

## 2021-09-16 DIAGNOSIS — E78.5 HYPERLIPIDEMIA, UNSPECIFIED HYPERLIPIDEMIA TYPE: ICD-10-CM

## 2021-09-16 DIAGNOSIS — M51.37 DDD (DEGENERATIVE DISC DISEASE), LUMBOSACRAL: ICD-10-CM

## 2021-09-16 DIAGNOSIS — M54.59 LUMBAR TRIGGER POINT SYNDROME: ICD-10-CM

## 2021-09-16 DIAGNOSIS — M54.59 MECHANICAL LOW BACK PAIN: ICD-10-CM

## 2021-09-16 DIAGNOSIS — M79.10 MYALGIA: ICD-10-CM

## 2021-09-16 DIAGNOSIS — M51.26 BULGE OF LUMBAR DISC WITHOUT MYELOPATHY: ICD-10-CM

## 2021-09-16 DIAGNOSIS — M47.816 FACET SYNDROME, LUMBAR: ICD-10-CM

## 2021-09-16 DIAGNOSIS — M47.816 LUMBAR SPONDYLOSIS: ICD-10-CM

## 2021-09-16 DIAGNOSIS — M51.16 LUMBAR DISC HERNIATION WITH RADICULOPATHY: ICD-10-CM

## 2021-09-16 PROCEDURE — 97110 THERAPEUTIC EXERCISES: CPT

## 2021-09-16 PROCEDURE — 97162 PT EVAL MOD COMPLEX 30 MIN: CPT

## 2021-09-17 NOTE — PROGRESS NOTES
SPINE EVALUATION:   Referring Physician: Dr. Javier Brock  Diagnosis: Chronic pain of both shoulders (M25.511,G89.29,M25.512)  Tendinopathy of rotator cuff, unspecified laterality (M67.919)  Lumbar trigger point syndrome (M54.59)  Facet syndrome, lumbar (M47.8 10/31/2016    Right frontal craniotomy for tumor resection   • Cholecystectomy  2012   • Colonoscopy      at [facility] about 8 years ago   • Other surgical history      [phone] [facility] presents to physical therapy evaluation with 5/5; L 5/5  Great Toe Ext (L5): R 5/5, L 5/5  Eversion (S1): R 5/5; L 5/5     6MWT: 1243 ft no rest breaks    Today’s Treatment and Response:   Pt education was provided on exam findings, treatment diagnosis, treatment plan, expectations, and prognosis.  Pt letter via fax as soon as possible to [phone].  If you have any questions, please contact me at Dept: [phone]    Sincerely,  Electronically signed by therapist: Ramin Castro PT    Physician's certification required: Yes  I certify the need for t

## 2021-09-24 ENCOUNTER — OFFICE VISIT (OUTPATIENT)
Dept: PHYSICAL THERAPY | Facility: HOSPITAL | Age: 76
End: 2021-09-24
Attending: PHYSICAL MEDICINE & REHABILITATION
Payer: MEDICARE

## 2021-09-24 PROCEDURE — 97110 THERAPEUTIC EXERCISES: CPT

## 2021-09-24 PROCEDURE — 97140 MANUAL THERAPY 1/> REGIONS: CPT

## 2021-09-24 NOTE — PROGRESS NOTES
Dx: Chronic pain of both shoulders (M25.511,G89.29,M25.512)  Tendinopathy of rotator cuff, unspecified laterality (M67.919)  Lumbar trigger point syndrome (M54.59)  Facet syndrome, lumbar (M47.816)  Mechanical low back pain (M54.59)  Biomechanical lesion ( full neck rotation. Goals:  (to be met in 10 visits)   Be able to perform OH reaching to retrieve or put back objects with less difficulty and max 3/10 pain.   Be able to perform ADLs and chores involving repetitive bending and twisting without diffic

## 2021-09-27 ENCOUNTER — APPOINTMENT (OUTPATIENT)
Dept: PHYSICAL THERAPY | Facility: HOSPITAL | Age: 76
End: 2021-09-27
Attending: PHYSICAL MEDICINE & REHABILITATION
Payer: MEDICARE

## 2021-09-27 ENCOUNTER — TELEPHONE (OUTPATIENT)
Dept: PHYSICAL THERAPY | Facility: HOSPITAL | Age: 76
End: 2021-09-27

## 2021-10-01 ENCOUNTER — OFFICE VISIT (OUTPATIENT)
Dept: PHYSICAL THERAPY | Facility: HOSPITAL | Age: 76
End: 2021-10-01
Attending: PHYSICAL MEDICINE & REHABILITATION
Payer: MEDICARE

## 2021-10-01 PROCEDURE — 97110 THERAPEUTIC EXERCISES: CPT

## 2021-10-01 PROCEDURE — 97140 MANUAL THERAPY 1/> REGIONS: CPT

## 2021-10-01 NOTE — PROGRESS NOTES
Dx: Chronic pain of both shoulders (M25.511,G89.29,M25.512)  Tendinopathy of rotator cuff, unspecified laterality (M67.919)  Lumbar trigger point syndrome (M54.59)  Facet syndrome, lumbar (M47.816)  Mechanical low back pain (M54.59)  Biomechanical lesion ( Goal:  Restore full pain-free neck and shoulder mobility    Plan: Continue manual therapy to address jt restrictions, progress neck stabilization, and incorporate corrective exercises to improve scapular and neck mechanics    Date: 9/24/2021  TX#: 2/10 Dmitri

## 2021-10-05 ENCOUNTER — OFFICE VISIT (OUTPATIENT)
Dept: PHYSICAL THERAPY | Facility: HOSPITAL | Age: 76
End: 2021-10-05
Attending: PHYSICAL MEDICINE & REHABILITATION
Payer: MEDICARE

## 2021-10-05 PROCEDURE — 97140 MANUAL THERAPY 1/> REGIONS: CPT

## 2021-10-05 PROCEDURE — 97110 THERAPEUTIC EXERCISES: CPT

## 2021-10-05 NOTE — PROGRESS NOTES
Dx: Chronic pain of both shoulders (M25.511,G89.29,M25.512)  Tendinopathy of rotator cuff, unspecified laterality (M67.919)  Lumbar trigger point syndrome (M54.59)  Facet syndrome, lumbar (M47.816)  Mechanical low back pain (M54.59)  Biomechanical lesion ( full pain-free neck and shoulder mobility    Plan: Continue manual therapy to address jt restrictions, progress scapular strengthening.      Date: 9/24/2021  TX#: 2/10 Date: 10/1/2021        TX#: 3/10 Date: 10/5/2021   TX#: 4/10 Date:                 TX#: 5

## 2021-10-11 ENCOUNTER — APPOINTMENT (OUTPATIENT)
Dept: PHYSICAL THERAPY | Facility: HOSPITAL | Age: 76
End: 2021-10-11
Attending: PHYSICAL MEDICINE & REHABILITATION
Payer: MEDICARE

## 2021-10-11 ENCOUNTER — TELEPHONE (OUTPATIENT)
Dept: PHYSICAL THERAPY | Facility: HOSPITAL | Age: 76
End: 2021-10-11

## 2021-10-11 RX ORDER — PREGABALIN 50 MG/1
50 CAPSULE ORAL 2 TIMES DAILY
Qty: 60 CAPSULE | Refills: 0 | Status: SHIPPED | OUTPATIENT
Start: 2021-10-11

## 2021-10-11 NOTE — TELEPHONE ENCOUNTER
Dr. Sohail Minor prescribed her this rx for the last 4 months, daughter requested this rx. Informed her she will have to decide who is going to continue prescribing rx.      Medication request: pregabalin (LYRICA) 100 MG Oral Cap   Sig:   Take 1 capsule (100 mg to

## 2021-10-13 ENCOUNTER — OFFICE VISIT (OUTPATIENT)
Dept: PHYSICAL MEDICINE AND REHAB | Facility: CLINIC | Age: 76
End: 2021-10-13
Payer: MEDICARE

## 2021-10-13 ENCOUNTER — HOSPITAL ENCOUNTER (OUTPATIENT)
Dept: GENERAL RADIOLOGY | Age: 76
Discharge: HOME | End: 2021-10-13
Attending: PHYSICAL MEDICINE & REHABILITATION
Payer: MEDICARE

## 2021-10-13 VITALS
BODY MASS INDEX: 23.37 KG/M2 | HEIGHT: 62 IN | SYSTOLIC BLOOD PRESSURE: 110 MMHG | HEART RATE: 66 BPM | OXYGEN SATURATION: 97 % | DIASTOLIC BLOOD PRESSURE: 60 MMHG | WEIGHT: 127 LBS

## 2021-10-13 DIAGNOSIS — M48.061 LUMBAR FORAMINAL STENOSIS: ICD-10-CM

## 2021-10-13 DIAGNOSIS — M47.816 FACET SYNDROME, LUMBAR: ICD-10-CM

## 2021-10-13 DIAGNOSIS — M25.511 CHRONIC PAIN OF BOTH SHOULDERS: Primary | ICD-10-CM

## 2021-10-13 DIAGNOSIS — M99.9 BIOMECHANICAL LESION: ICD-10-CM

## 2021-10-13 DIAGNOSIS — M54.59 LUMBAR TRIGGER POINT SYNDROME: ICD-10-CM

## 2021-10-13 DIAGNOSIS — M25.512 CHRONIC PAIN OF BOTH SHOULDERS: Primary | ICD-10-CM

## 2021-10-13 DIAGNOSIS — M25.512 CHRONIC PAIN OF BOTH SHOULDERS: ICD-10-CM

## 2021-10-13 DIAGNOSIS — G89.29 CHRONIC PAIN OF BOTH SHOULDERS: ICD-10-CM

## 2021-10-13 DIAGNOSIS — M67.919 TENDINOPATHY OF ROTATOR CUFF, UNSPECIFIED LATERALITY: ICD-10-CM

## 2021-10-13 DIAGNOSIS — M79.10 MYALGIA: ICD-10-CM

## 2021-10-13 DIAGNOSIS — G89.29 CHRONIC PAIN OF BOTH SHOULDERS: Primary | ICD-10-CM

## 2021-10-13 DIAGNOSIS — M51.26 BULGE OF LUMBAR DISC WITHOUT MYELOPATHY: ICD-10-CM

## 2021-10-13 DIAGNOSIS — M54.59 MECHANICAL LOW BACK PAIN: ICD-10-CM

## 2021-10-13 DIAGNOSIS — M25.511 CHRONIC PAIN OF BOTH SHOULDERS: ICD-10-CM

## 2021-10-13 DIAGNOSIS — M47.816 LUMBAR SPONDYLOSIS: ICD-10-CM

## 2021-10-13 DIAGNOSIS — M51.37 DDD (DEGENERATIVE DISC DISEASE), LUMBOSACRAL: ICD-10-CM

## 2021-10-13 PROCEDURE — 73030 X-RAY EXAM OF SHOULDER: CPT | Performed by: PHYSICAL MEDICINE & REHABILITATION

## 2021-10-13 PROCEDURE — 99215 OFFICE O/P EST HI 40 MIN: CPT | Performed by: PHYSICAL MEDICINE & REHABILITATION

## 2021-10-13 RX ORDER — SERTRALINE HYDROCHLORIDE 25 MG/1
25 TABLET, FILM COATED ORAL DAILY
Qty: 30 TABLET | Refills: 0 | Status: SHIPPED | OUTPATIENT
Start: 2021-10-13

## 2021-10-13 RX ORDER — SERTRALINE HYDROCHLORIDE 100 MG/1
100 TABLET, FILM COATED ORAL NIGHTLY
Qty: 30 TABLET | Refills: 0 | Status: SHIPPED | OUTPATIENT
Start: 2021-10-13

## 2021-10-13 NOTE — PATIENT INSTRUCTIONS
1) Get XR of the shoulders today on your way out  2) Begin therapy with an emphasis on the shoulders  3) We can perfrom subacromial injections if symptoms do not improve  4) Increase Sertraline to 125 mg daily (1 100mg tablet and 1 25 mg tablet).  Continue

## 2021-10-13 NOTE — PROGRESS NOTES
[address]  Progress Note    CHIEF COMPLAINT:  Patient presents with:  Shoulder Pain: LOV 8/12/21 Pt comes in with shoulder pain right side that radiates to bicep. States no injury Has been bothering her for HISTORY:  Past Surgical History:   Procedure Laterality Date   • BRAIN SURGERY  10/31/2016    Right frontal craniotomy for tumor resection   • CHOLECYSTECTOMY  2012   • COLONOSCOPY      at [facility] about 8 years ago   • OTHER SURGICAL HISTORY mouth daily.      • DICLOFENAC SODIUM 0.1 % Ophthalmic Solution INSTILL 1 DROP INTO IN AFFECTED EYE(S) AS DIRECTED 5 mL 0   • ALENDRONATE SODIUM 70 MG Oral Tab TAKE 1 TABLET BY MOUTH ONCE A WEEK BEFORE BREAKFAST 4 tablet 2   • acetaminophen (TYLENOL) 325 MG in all myotomes of the bilateral upper extremities  Sensation: Intact to light touch in all dermatomes of the upper extremities  Reflexes: 2/4 at C5, C6, C7  Neer's test: Negative  Hawkin's test: Negative  Cross Arm Test: [facility]  Speed' FINDINGS:   BONES: Mild-to-moderate bilateral hip osteoarthritis. Acetabular chondrocalcinosis compatible with calcium pyrophosphate dihydrate deposition.   Old left anterior inferior iliac spine avulsion fracture no suspicious bone lesion or acute fract as documented in AVS summary. The patient was in agreement with the assessment and plan. All questions were answered. There were no barriers to learning.     I have spent >40 minutes discussing the key components of this visit including the history, phys

## 2021-10-14 ENCOUNTER — TELEPHONE (OUTPATIENT)
Dept: PHYSICAL THERAPY | Facility: HOSPITAL | Age: 76
End: 2021-10-14

## 2021-10-15 ENCOUNTER — APPOINTMENT (OUTPATIENT)
Dept: PHYSICAL THERAPY | Facility: HOSPITAL | Age: 76
End: 2021-10-15
Attending: PHYSICAL MEDICINE & REHABILITATION
Payer: MEDICARE

## 2021-10-19 ENCOUNTER — APPOINTMENT (OUTPATIENT)
Dept: PHYSICAL THERAPY | Facility: HOSPITAL | Age: 76
End: 2021-10-19
Attending: PHYSICAL MEDICINE & REHABILITATION
Payer: MEDICARE

## 2021-10-21 ENCOUNTER — APPOINTMENT (OUTPATIENT)
Dept: PHYSICAL THERAPY | Facility: HOSPITAL | Age: 76
End: 2021-10-21
Attending: PHYSICAL MEDICINE & REHABILITATION
Payer: MEDICARE

## 2021-10-26 ENCOUNTER — OFFICE VISIT (OUTPATIENT)
Dept: PHYSICAL THERAPY | Facility: HOSPITAL | Age: 76
End: 2021-10-26
Attending: PHYSICAL MEDICINE & REHABILITATION
Payer: MEDICARE

## 2021-10-26 PROCEDURE — 97140 MANUAL THERAPY 1/> REGIONS: CPT

## 2021-10-26 PROCEDURE — 97110 THERAPEUTIC EXERCISES: CPT

## 2021-10-26 NOTE — PROGRESS NOTES
Dx: Chronic pain of both shoulders (M25.511,G89.29,M25.512)  Tendinopathy of rotator cuff, unspecified laterality (M67.919)  Lumbar trigger point syndrome (M54.59)  Facet syndrome, lumbar (M47.816)  Mechanical low back pain (M54.59)  Biomechanical lesion ( 9/24/2021  TX#: 2/10 Date: 10/1/2021        TX#: 3/10 Date: 10/5/2021   TX#: 4/10 Date: 10/26/2021           TX#: 5/10 Date:    Tx#: 6/   MT  -upper and mid T/S gr 4 CPA  -CT junction harmonics  -CT junction STM MT  -CT junction harmonics  -Prone CT junctio

## 2021-11-23 ENCOUNTER — OFFICE VISIT (OUTPATIENT)
Dept: PHYSICAL THERAPY | Facility: HOSPITAL | Age: 76
End: 2021-11-23
Attending: PHYSICAL MEDICINE & REHABILITATION
Payer: MEDICARE

## 2021-11-23 PROCEDURE — 97110 THERAPEUTIC EXERCISES: CPT

## 2021-11-23 PROCEDURE — 97140 MANUAL THERAPY 1/> REGIONS: CPT

## 2021-11-23 NOTE — PROGRESS NOTES
Dx:   Insurance (Authorized # of Visits): Authorizing Physician: Dr. Gali Tucker  Next MD visit: none scheduled  Fall Risk: standard         Precautions: n/a             Subjective: Reports only a little pain along lower back and shoulders.  3/10 2x5  -NuStep x5 min  EX  -Rows RTB 2x10  -Pull downs YTB 2x10  -open books x15 bilat  -supine sh IR 90/90 YTB x25  -contract relax sh IR  -Touchdowns wall slides with deep breath and shrug 2x5  -NuStep x5 min                       Charges: MT, EX 2       T

## 2021-11-30 ENCOUNTER — OFFICE VISIT (OUTPATIENT)
Dept: PHYSICAL THERAPY | Facility: HOSPITAL | Age: 76
End: 2021-11-30
Attending: PHYSICAL MEDICINE & REHABILITATION
Payer: MEDICARE

## 2021-11-30 PROCEDURE — 97140 MANUAL THERAPY 1/> REGIONS: CPT

## 2021-11-30 PROCEDURE — 97110 THERAPEUTIC EXERCISES: CPT

## 2021-11-30 NOTE — PROGRESS NOTES
Dx:   Insurance (Authorized # of Visits): Authorizing Physician: Dr. Katty Desai  Next MD visit: none scheduled  Fall Risk: standard         Precautions: n/a             Subjective: Reports only a little pain along lower back and shoulders.  3/10 2x30 sec  -quadruped rocking x10 EX  -Rows RTB 3x10  -Seated T/S extension stretch x10 hold 3 sec  -Sh abduction wall wipes x10 bilat  -quadruped rocking x10  -Touchdowns wall slides with deep breath and shrug 2x5  -NuStep x5 min  EX  -Rows RTB 2x10  -Pull

## 2021-12-02 ENCOUNTER — OFFICE VISIT (OUTPATIENT)
Dept: PHYSICAL THERAPY | Facility: HOSPITAL | Age: 76
End: 2021-12-02
Attending: PHYSICAL MEDICINE & REHABILITATION
Payer: MEDICARE

## 2021-12-02 PROCEDURE — 97140 MANUAL THERAPY 1/> REGIONS: CPT

## 2021-12-02 PROCEDURE — 97110 THERAPEUTIC EXERCISES: CPT

## 2021-12-02 NOTE — PROGRESS NOTES
SH AROM WNL pain-free  Neck AROM WNL pain-free      Date: 10/1/2021  TX#: 3/10 Date:                 TX#: 4/ Date:                 TX#: 5/ Date:                 TX#: 6/ Date:    Tx#: 7/ Date: 12/2/2021  Tx#: 8/   MT  -CT junction harmonics  -Prone CT juncti books x15 bilat  -supine sh IR 90/90 YTB x25  -contract relax sh IR  -Touchdowns wall slides with deep breath and shrug 2x5  -NuStep x5 min  EX  -Rows RTB 2x10  -Pull downs YTB 2x10  -open books x15 bilat  -supine sh IR 90/90 YTB x25  -Sh abd wall wipes 2x

## 2021-12-08 NOTE — PROGRESS NOTES
Subjective:   Patient ID: Niraj Cuevas is a 77year old female. Chart reviewed  Multiple medical issues   Medications reiewd   Seeing oncologist neurosurgeon, neurologist regularly   Brain lymphoma in remission.    Everything hurts- back espec 325 MG Oral Tab Take 325 mg by mouth every 6 (six) hours as needed for Pain. • loratadine 10 MG Oral Tab Take 10 mg by mouth daily. • pregabalin 100 MG Oral Cap Take 100 mg by mouth 2 (two) times daily.      • Calcium Carbonate-Vit D-Min (CALCIUM 12 Visit:  Requested Prescriptions      No prescriptions requested or ordered in this encounter       Imaging & Referrals:  [facility] ([email or web address])

## 2021-12-15 ENCOUNTER — OFFICE VISIT (OUTPATIENT)
Dept: FAMILY MEDICINE CLINIC | Facility: CLINIC | Age: 76
End: 2021-12-15
Payer: MEDICARE

## 2021-12-15 VITALS
HEIGHT: 59.8 IN | DIASTOLIC BLOOD PRESSURE: 63 MMHG | SYSTOLIC BLOOD PRESSURE: 107 MMHG | BODY MASS INDEX: 25.13 KG/M2 | HEART RATE: 51 BPM | WEIGHT: 128 LBS

## 2021-12-15 DIAGNOSIS — M79.642 PAIN IN BOTH HANDS: ICD-10-CM

## 2021-12-15 DIAGNOSIS — M79.641 PAIN IN BOTH HANDS: ICD-10-CM

## 2021-12-15 DIAGNOSIS — M54.59 OTHER LOW BACK PAIN: Primary | ICD-10-CM

## 2021-12-15 PROCEDURE — 99213 OFFICE O/P EST LOW 20 MIN: CPT | Performed by: FAMILY MEDICINE

## 2021-12-15 PROCEDURE — 97810 ACUP 1/> WO ESTIM 1ST 15 MIN: CPT | Performed by: FAMILY MEDICINE

## 2021-12-15 NOTE — PROGRESS NOTES
Subjective:   Patient ID: Corbin Mota is a 77year old female. Patient with the pain in the back and hands. , here for acu       History/Other:   Review of Systems   Constitutional: Negative. Negative for fatigue.    Respiratory: Negative for Allergies:  Dust Mite Extract       Runny nose  Latex                   RASH  Seasonal                UNKNOWN  Latex                   ITCHING    Objective:   Physical Exam  Constitutional:       Appearance: She is well-developed.    Cardiovascular:

## 2021-12-15 NOTE — PROCEDURES
Patient tolerated procedure well in excess of 30 minutes was spent with patient   There was no complications all needles were removed.    Patient was advised to rest today and f/u in 1-2 weeks  Tom ferrell   gb 39   bl 60  Ear points

## 2022-01-06 ENCOUNTER — HOSPITAL ENCOUNTER (OUTPATIENT)
Dept: GENERAL RADIOLOGY | Facility: HOSPITAL | Age: 77
Discharge: HOME | End: 2022-01-06
Attending: PHYSICAL MEDICINE & REHABILITATION
Payer: MEDICARE

## 2022-01-06 ENCOUNTER — OFFICE VISIT (OUTPATIENT)
Dept: PHYSICAL MEDICINE AND REHAB | Facility: CLINIC | Age: 77
End: 2022-01-06
Payer: MEDICARE

## 2022-01-06 VITALS
DIASTOLIC BLOOD PRESSURE: 58 MMHG | OXYGEN SATURATION: 98 % | HEIGHT: 59 IN | BODY MASS INDEX: 25.8 KG/M2 | WEIGHT: 128 LBS | HEART RATE: 64 BPM | SYSTOLIC BLOOD PRESSURE: 110 MMHG

## 2022-01-06 DIAGNOSIS — I10 ESSENTIAL HYPERTENSION: ICD-10-CM

## 2022-01-06 DIAGNOSIS — M79.10 MYALGIA: ICD-10-CM

## 2022-01-06 DIAGNOSIS — M70.50 PES ANSERINE BURSITIS: ICD-10-CM

## 2022-01-06 DIAGNOSIS — M22.2X9 PATELLOFEMORAL PAIN SYNDROME, UNSPECIFIED LATERALITY: ICD-10-CM

## 2022-01-06 DIAGNOSIS — M99.9 BIOMECHANICAL LESION: ICD-10-CM

## 2022-01-06 DIAGNOSIS — M17.11 PRIMARY OSTEOARTHRITIS OF RIGHT KNEE: ICD-10-CM

## 2022-01-06 DIAGNOSIS — M22.2X9 PATELLOFEMORAL PAIN SYNDROME, UNSPECIFIED LATERALITY: Primary | ICD-10-CM

## 2022-01-06 PROCEDURE — 73522 X-RAY EXAM HIPS BI 3-4 VIEWS: CPT | Performed by: PHYSICAL MEDICINE & REHABILITATION

## 2022-01-06 PROCEDURE — 73140 X-RAY EXAM OF FINGER(S): CPT | Performed by: PHYSICAL MEDICINE & REHABILITATION

## 2022-01-06 PROCEDURE — 99214 OFFICE O/P EST MOD 30 MIN: CPT | Performed by: PHYSICAL MEDICINE & REHABILITATION

## 2022-01-06 PROCEDURE — 73564 X-RAY EXAM KNEE 4 OR MORE: CPT | Performed by: PHYSICAL MEDICINE & REHABILITATION

## 2022-01-06 NOTE — PROGRESS NOTES
[address]  Progress Note    CHIEF COMPLAINT:  Patient presents with:  Knee Pain: LOV 10/13. C/o new R knee pain. Ongoing for 2 weeks w/o cause. Denies pain now but pain aggravated by activity, 8/10.  Denies SOCIAL HISTORY:   Social History    Occupational History      Not on file    Tobacco Use      Smoking status: Never Smoker      Smokeless tobacco: Never Used    Vaping Use      Vaping Use: Never used    Substance and Sexual Activity      Alcohol use: Take 325 mg by mouth every 6 (six) hours as needed for Pain. • pregabalin 100 MG Oral Cap Take 100 mg by mouth 2 (two) times daily.          ALLERGIES:     Dust Mite Extract       Runny nose  Latex                   RASH  Seasonal                UNKNOWN instability  Valgus/Varus stress test: negative for instablity  Collin Test: negative for medial or lateral joint line pain or \"catch\"      Gait:  Normal    Data  No visits with results within 6 Month(s) from this visit.    Latest known visit with resul physical therapy and Tylenol for pain. She will follow-up with me in about 6 to 8 weeks. If no improvement, then I would recommend right knee hyaluronic acid and corticosteroid injection depending on findings from the x-ray. .    RTC in 6 to 8 weeks  Discha

## 2022-01-06 NOTE — PATIENT INSTRUCTIONS
1) Please get X-rays of the right knee, pelvis, and right thumb today on your way out. 2) Begin formal phyicla therapy with Wil at the [facility]  3) Tylenol 500-1000 mg every 6-8 hours as needed for pain. No more than 3000 mg daily.   4) Use ic

## 2022-01-20 ENCOUNTER — TELEMEDICINE (OUTPATIENT)
Dept: FAMILY MEDICINE CLINIC | Facility: CLINIC | Age: 77
End: 2022-01-20

## 2022-01-20 DIAGNOSIS — U07.1 COVID-19: Primary | ICD-10-CM

## 2022-01-20 PROCEDURE — 99213 OFFICE O/P EST LOW 20 MIN: CPT | Performed by: FAMILY MEDICINE

## 2022-01-20 RX ORDER — BENZONATATE 100 MG/1
100 CAPSULE ORAL 3 TIMES DAILY PRN
Qty: 30 CAPSULE | Refills: 0 | Status: SHIPPED | OUTPATIENT
Start: 2022-01-20

## 2022-01-20 NOTE — PROGRESS NOTES
Subjective:   Patient ID: Yanet Miles is a 77year old female.     Telehealth outside of [facility] [address] Verbal Consent   I conducted a telehealth visit with Yanet Miles today, 01/20/22, which was completed using two-way, real-ti nasal congestion. She using tylenol for pain. History/Other:   Review of Systems     Constitutional: Negative. Negative for activity change, appetite change, diaphoresis and fatigue.    heent see hpi   Respiratory: see hpi   Cardiovascular: Negativ phone and speaking in full sentences without shortness of breath      Assessment & Plan:   (U07.1) COVID-19  (primary encounter diagnosis)  Plan: doing better   Advised zinc, vitamin C D   tessolon drops   F/u prn       No orders of the defined types were

## 2022-02-11 ENCOUNTER — OFFICE VISIT (OUTPATIENT)
Dept: PHYSICAL THERAPY | Facility: HOSPITAL | Age: 77
End: 2022-02-11
Attending: PHYSICAL MEDICINE & REHABILITATION
Payer: MEDICARE

## 2022-02-11 DIAGNOSIS — M51.26 BULGE OF LUMBAR DISC WITHOUT MYELOPATHY: ICD-10-CM

## 2022-02-11 DIAGNOSIS — M47.816 LUMBAR SPONDYLOSIS: ICD-10-CM

## 2022-02-11 DIAGNOSIS — M25.512 CHRONIC PAIN OF BOTH SHOULDERS: ICD-10-CM

## 2022-02-11 DIAGNOSIS — M47.816 FACET SYNDROME, LUMBAR: ICD-10-CM

## 2022-02-11 DIAGNOSIS — G89.29 CHRONIC PAIN OF BOTH SHOULDERS: ICD-10-CM

## 2022-02-11 DIAGNOSIS — M67.919 TENDINOPATHY OF ROTATOR CUFF, UNSPECIFIED LATERALITY: ICD-10-CM

## 2022-02-11 DIAGNOSIS — M54.59 MECHANICAL LOW BACK PAIN: ICD-10-CM

## 2022-02-11 DIAGNOSIS — M79.10 MYALGIA: ICD-10-CM

## 2022-02-11 DIAGNOSIS — M54.59 LUMBAR TRIGGER POINT SYNDROME: ICD-10-CM

## 2022-02-11 DIAGNOSIS — M25.511 CHRONIC PAIN OF BOTH SHOULDERS: ICD-10-CM

## 2022-02-11 DIAGNOSIS — M48.061 LUMBAR FORAMINAL STENOSIS: ICD-10-CM

## 2022-02-11 DIAGNOSIS — M99.9 BIOMECHANICAL LESION: ICD-10-CM

## 2022-02-11 DIAGNOSIS — M51.37 DDD (DEGENERATIVE DISC DISEASE), LUMBOSACRAL: ICD-10-CM

## 2022-02-11 PROCEDURE — 97140 MANUAL THERAPY 1/> REGIONS: CPT

## 2022-02-11 PROCEDURE — 97162 PT EVAL MOD COMPLEX 30 MIN: CPT

## 2022-02-11 PROCEDURE — 97110 THERAPEUTIC EXERCISES: CPT

## 2022-02-14 ENCOUNTER — OFFICE VISIT (OUTPATIENT)
Dept: PHYSICAL THERAPY | Facility: HOSPITAL | Age: 77
End: 2022-02-14
Attending: PHYSICAL MEDICINE & REHABILITATION
Payer: MEDICARE

## 2022-02-14 PROCEDURE — 97140 MANUAL THERAPY 1/> REGIONS: CPT

## 2022-02-14 PROCEDURE — 97110 THERAPEUTIC EXERCISES: CPT

## 2022-02-14 NOTE — PROGRESS NOTES
Dx: Patellofemoral pain syndrome, unspecified laterality (M22.2X9)  Primary osteoarthritis of right knee (M17.11)  Pes anserine bursitis (M70.50)  Myalgia (M79.10)  Biomechanical lesion (M99.9)  Essential hypertension (I10)               Insurance (Authorized # of Visits): 10 (MEDICARE PART A&B)           Authorizing Physician: Dr. Michelle Drake MD visit: 3/9/2022  Precautions: none             Subjective: Reports R knee and inner thigh pain is the same. Has been doing her exercises at home. Pain: 4-8/10 R proximal inner thigh      Objective:   Obturator N tension (+)     AROM: (* denotes performed with pain)  Hip   Flexion: R 100; L WNL  Extension: R 0; L 0  Abduction: R 30 groin pain; L WNL  ER: R 45; L 55  IR: R 20; L 20     Accessory motion: restricted R medial tibial glides      Assessment: Overuses adductor brevis and pectineus as dominant adductors and hip flexors leading to irritation. Responded well to [facility] followed by hip abd strengthening for reciprocal inhibition to alleviate pain. Goals: (to be met in 10 visits)  Negotiate stairs reciprocally max 3/10 pain to be able to get around her house without difficulty. At least 30 min walking to be able to go grocery shopping without difficulty max 3/10 pain  Be able to perform household chores without difficulty and max 3/10 pain    Plan: Continue manual therapy to address jt and tissue restrictions. Improved adductor flexibility, restore glut med/max strength. Add bridging. Date: 2/14/2022  TX#: 2/10 Date:                 TX#: 3/ Date:                 TX#: 4/ Date:                 TX#: 5/ Date:    Tx#: 6/   MT  -mid T/S and TL CPA gr 3  -R femoral lateral distraction gr 3  -R hip harmonics  -STM R adductor brevis  -FMP joint technique into R hip flexion with MET holds       EX  -hip abd slides 2x10 RLE slow and controlled  -Supine hip flexion in axis training (x3 min  -S/L clams with TA brace bilat hold 5 sec x10  -R S/L hip abd 2x10  -seated hip abd isometrics 5x20 sec  -hip adductor stretching R hip 3x30 sec       HEP - added hip abd isometrics                Charges: MT, EX 2       Total Timed Treatment: MT 15 min, EX 25 min  Total Treatment Time: 42 min

## 2022-02-17 ENCOUNTER — OFFICE VISIT (OUTPATIENT)
Dept: PHYSICAL THERAPY | Facility: HOSPITAL | Age: 77
End: 2022-02-17
Attending: PHYSICAL MEDICINE & REHABILITATION
Payer: MEDICARE

## 2022-02-17 PROCEDURE — 97110 THERAPEUTIC EXERCISES: CPT

## 2022-02-17 PROCEDURE — 97140 MANUAL THERAPY 1/> REGIONS: CPT

## 2022-02-17 NOTE — PROGRESS NOTES
Dx: Patellofemoral pain syndrome, unspecified laterality (M22.2X9)  Primary osteoarthritis of right knee (M17.11)  Pes anserine bursitis (M70.50)  Myalgia (M79.10)  Biomechanical lesion (M99.9)  Essential hypertension (I10)               Insurance (Authorized # of Visits): 10 (MEDICARE PART A&B)           Authorizing Physician: Dr. aCss Drake MD visit: 3/9/2022  Precautions: none             Subjective: Google translate used to communicate. Reports R groin and inner thigh feeling much better. Today R medial knee and shin sore. Denies any change in activity levels. Pain: 4-8/10 R proximal inner thigh      Objective:     Obturator N tension (+)     AROM: (* denotes performed with pain)  Hip   Flexion: R 100; L WNL  Extension: R 0; L 0  Abduction: R 35 groin pain; L WNL  ER: R 45; L 55  IR: R 20; L 20     Accessory motion: restricted R medial tibial glides      Assessment: Minimal TTP along adductor brevis and pectineus. Hip abd excursion increasing with minimal pain. Glut med and max recruitment improving. Goals: (to be met in 10 visits)  Negotiate stairs reciprocally max 3/10 pain to be able to get around her house without difficulty. At least 30 min walking to be able to go grocery shopping without difficulty max 3/10 pain  Be able to perform household chores without difficulty and max 3/10 pain    Plan: Continue manual therapy to address jt and tissue restrictions. Improved adductor flexibility, restore glut med/max strength. Date: 2/14/2022  TX#: 2/10 Date:  2/17/2022               TX#: 3/10 Date:                 TX#: 4/ Date:                 TX#: 5/ Date:    Tx#: 6/   MT  -mid T/S and TL CPA gr 3  -R femoral lateral distraction gr 3  -R hip harmonics  -STM R adductor brevis  -FMP joint technique into R hip flexion with MET holds MT  -mid T/S and TL CPA gr 3  -R femoral lateral distraction gr 3  -R hip harmonics  -STM R adductor brevis  -FMP joint technique into R hip flexion with MET holds EX  -hip abd slides 2x10 RLE slow and controlled  -Supine hip flexion in axis training (x3 min  -S/L clams with TA brace bilat hold 5 sec x10  -R S/L hip abd 2x10  -seated hip abd isometrics 5x20 sec  -hip adductor stretching R hip 3x30 sec EX  -Shuttle knee unloading 25 lbs YTB x50 reps    -Supine knee flexion ball rolls x20 RLE    -Supine hip abd RLE 7x5 sec holds    -Bridging with band 3x10     -Clams x10 hold 5 sec RLE    -SL R hip abd 2x10        HEP - added hip abd isometrics                Charges: MT, EX 2       Total Timed Treatment: MT 15 min, EX 25 min  Total Treatment Time: 42 min

## 2022-02-21 ENCOUNTER — APPOINTMENT (OUTPATIENT)
Dept: PHYSICAL THERAPY | Facility: HOSPITAL | Age: 77
End: 2022-02-21
Attending: PHYSICAL MEDICINE & REHABILITATION
Payer: MEDICARE

## 2022-02-21 ENCOUNTER — TELEPHONE (OUTPATIENT)
Dept: PHYSICAL THERAPY | Facility: HOSPITAL | Age: 77
End: 2022-02-21

## 2022-02-24 ENCOUNTER — OFFICE VISIT (OUTPATIENT)
Dept: PHYSICAL THERAPY | Facility: HOSPITAL | Age: 77
End: 2022-02-24
Attending: PHYSICAL MEDICINE & REHABILITATION
Payer: MEDICARE

## 2022-02-24 PROCEDURE — 97140 MANUAL THERAPY 1/> REGIONS: CPT

## 2022-02-24 PROCEDURE — 97110 THERAPEUTIC EXERCISES: CPT

## 2022-02-24 NOTE — PROGRESS NOTES
Dx: Patellofemoral pain syndrome, unspecified laterality (M22.2X9)  Primary osteoarthritis of right knee (M17.11)  Pes anserine bursitis (M70.50)  Myalgia (M79.10)  Biomechanical lesion (M99.9)  Essential hypertension (I10)               Insurance (Authorized # of Visits): 10 (MEDICARE PART A&B)           Authorizing Physician: Dr. Belkys Drake MD visit: 3/9/2022  Precautions: none             Subjective: Google translate used to communicate. Reports no pain in groin and adductors. Has been feeling R medial knee and tibial pain past couple of days. States she has been going up/down stairs quite a bit recently. Pain: 4-8/10 R medial knee and tibia. Objective:     Obturator N tension (+)     AROM: (* denotes performed with pain)  Hip   Flexion: R 100; L WNL  Extension: R 0; L 0  Abduction: R 40 groin tightness but pain-free; L WNL  ER: R 45; L 55  IR: R 20; L 20   R Knee: 3-0-130 end range pain both directions. Accessory motion: restricted R medial tibial glide. Exquisite TTP along R medial knee jt line. Varus Stress Test: R -  Valgus Stress Test: R +  Collin's Test: R +    Assessment: NO longer TTP along R hip adductors. Symptoms isolated to medial meniscal irritation likely due to increased use of stairs with poor knee alignment as pt has tendency towards Anterior glide medial rotation R. Pain-free when mechanics corrected. Advised pt to avoid excessive use of stairs to allow for appropriate healing. Goals: (to be met in 10 visits)  Negotiate stairs reciprocally max 3/10 pain to be able to get around her house without difficulty. At least 30 min walking to be able to go grocery shopping without difficulty max 3/10 pain  Be able to perform household chores without difficulty and max 3/10 pain    Plan: Continue manual therapy to address jt and tissue restrictions. Improved adductor flexibility, restore glut med/max strength.      Date: 2/14/2022  TX#: 2/10 Date:  2/17/2022 TX#: 3/10 Date: 2/24/2022  TX#: 4/10 Date:                 TX#: 5/ Date:    Tx#: 6/   MT  -mid T/S and TL CPA gr 3  -R femoral lateral distraction gr 3  -R hip harmonics  -STM R adductor brevis  -FMP joint technique into R hip flexion with MET holds MT  -mid T/S and TL CPA gr 3  -R femoral lateral distraction gr 3  -R hip harmonics  -STM R adductor brevis  -FMP joint technique into R hip flexion with MET holds MT  -mid T/S and TL CPA gr 3  -R femoral lateral distraction gr 3  -R medial meniscal mobs gr 3  -R medial tibial glide mobs gr 4  -FMP joint technique into R hip flexion with MET holds     EX  -hip abd slides 2x10 RLE slow and controlled  -Supine hip flexion in axis training (x3 min  -S/L clams with TA brace bilat hold 5 sec x10  -R S/L hip abd 2x10  -seated hip abd isometrics 5x20 sec  -hip adductor stretching R hip 3x30 sec EX  -Shuttle knee unloading 25 lbs YTB x50 reps    -Supine knee flexion ball rolls x20 RLE    -Supine hip abd RLE 7x5 sec holds    -Bridging with band 3x10     -Clams x10 hold 5 sec RLE    -SL R hip abd 2x10   EX  -Bridging with band 3x10     -SL R hip abd 3x10    -Shuttle knee unloading 25 lbs YTB x50 reps    -SAQ R x10 3 lb wt     -LAQ x10 R 5 lb wt     -Standing hip abd 2x10 YTB bilat       HEP - added hip abd isometrics                Charges: MT, EX 2       Total Timed Treatment: MT 15 min, EX 25 min  Total Treatment Time: 42 min

## 2022-03-07 ENCOUNTER — APPOINTMENT (OUTPATIENT)
Dept: PHYSICAL THERAPY | Facility: HOSPITAL | Age: 77
End: 2022-03-07
Attending: PHYSICAL MEDICINE & REHABILITATION
Payer: MEDICARE

## 2022-03-07 ENCOUNTER — TELEPHONE (OUTPATIENT)
Dept: PHYSICAL THERAPY | Facility: HOSPITAL | Age: 77
End: 2022-03-07

## 2022-03-10 ENCOUNTER — APPOINTMENT (OUTPATIENT)
Dept: PHYSICAL THERAPY | Facility: HOSPITAL | Age: 77
End: 2022-03-10
Attending: PHYSICAL MEDICINE & REHABILITATION
Payer: MEDICARE

## 2022-03-20 ENCOUNTER — HOSPITAL ENCOUNTER (OUTPATIENT)
Age: 77
Discharge: HOME | End: 2022-03-20
Payer: MEDICARE

## 2022-03-20 VITALS
RESPIRATION RATE: 18 BRPM | SYSTOLIC BLOOD PRESSURE: 132 MMHG | DIASTOLIC BLOOD PRESSURE: 45 MMHG | OXYGEN SATURATION: 99 % | HEART RATE: 54 BPM | TEMPERATURE: 97 F

## 2022-03-20 DIAGNOSIS — R21 RASH AND NONSPECIFIC SKIN ERUPTION: Primary | ICD-10-CM

## 2022-03-20 PROCEDURE — 99213 OFFICE O/P EST LOW 20 MIN: CPT

## 2022-03-20 RX ORDER — PREDNISONE 20 MG/1
40 TABLET ORAL DAILY
Qty: 8 TABLET | Refills: 0 | Status: SHIPPED | OUTPATIENT
Start: 2022-03-20 | End: 2022-03-24

## 2022-03-20 NOTE — ED INITIAL ASSESSMENT (HPI)
2 days of rash to RUE and posterior neck. No fever. Blistering present per patient. Painful to touch. + itching.

## 2022-03-24 ENCOUNTER — OFFICE VISIT (OUTPATIENT)
Dept: PHYSICAL MEDICINE AND REHAB | Facility: CLINIC | Age: 77
End: 2022-03-24
Payer: MEDICARE

## 2022-03-24 ENCOUNTER — TELEPHONE (OUTPATIENT)
Dept: NEUROLOGY | Facility: CLINIC | Age: 77
End: 2022-03-24

## 2022-03-24 ENCOUNTER — HOSPITAL ENCOUNTER (OUTPATIENT)
Dept: GENERAL RADIOLOGY | Facility: HOSPITAL | Age: 77
Discharge: HOME | End: 2022-03-24
Attending: PHYSICAL MEDICINE & REHABILITATION
Payer: MEDICARE

## 2022-03-24 VITALS
DIASTOLIC BLOOD PRESSURE: 56 MMHG | HEIGHT: 59 IN | HEART RATE: 63 BPM | SYSTOLIC BLOOD PRESSURE: 118 MMHG | WEIGHT: 129 LBS | OXYGEN SATURATION: 98 % | BODY MASS INDEX: 26 KG/M2

## 2022-03-24 DIAGNOSIS — M17.11 PRIMARY OSTEOARTHRITIS OF RIGHT KNEE: ICD-10-CM

## 2022-03-24 DIAGNOSIS — M17.0 PRIMARY OSTEOARTHRITIS OF BOTH KNEES: ICD-10-CM

## 2022-03-24 DIAGNOSIS — M70.50 PES ANSERINE BURSITIS: ICD-10-CM

## 2022-03-24 DIAGNOSIS — M11.20 CALCIUM PYROPHOSPHATE DEPOSITION DISEASE (CPPD): Primary | ICD-10-CM

## 2022-03-24 DIAGNOSIS — M79.10 MYALGIA: ICD-10-CM

## 2022-03-24 DIAGNOSIS — M22.2X9 PATELLOFEMORAL PAIN SYNDROME, UNSPECIFIED LATERALITY: ICD-10-CM

## 2022-03-24 DIAGNOSIS — M25.561 CHRONIC PAIN OF BOTH KNEES: ICD-10-CM

## 2022-03-24 DIAGNOSIS — M25.562 CHRONIC PAIN OF BOTH KNEES: ICD-10-CM

## 2022-03-24 DIAGNOSIS — G89.29 CHRONIC PAIN OF BOTH KNEES: ICD-10-CM

## 2022-03-24 DIAGNOSIS — M11.20 CALCIUM PYROPHOSPHATE DEPOSITION DISEASE (CPPD): ICD-10-CM

## 2022-03-24 PROCEDURE — 99214 OFFICE O/P EST MOD 30 MIN: CPT | Performed by: PHYSICAL MEDICINE & REHABILITATION

## 2022-03-24 PROCEDURE — 73564 X-RAY EXAM KNEE 4 OR MORE: CPT | Performed by: PHYSICAL MEDICINE & REHABILITATION

## 2022-03-24 RX ORDER — PREGABALIN 100 MG/1
100 CAPSULE ORAL 2 TIMES DAILY
Qty: 60 CAPSULE | Refills: 1 | Status: SHIPPED | OUTPATIENT
Start: 2022-03-24

## 2022-03-24 NOTE — PATIENT INSTRUCTIONS
1) Continue with your home exercise program  2) My office will call you to schedule the BILATERAL knee CSI under ultrasound guidance once the procedure is approved by your insurance carrier. 3) Please get X-rays of the LEFT knee today on your way out. 4) Tylenol 500-1000 mg every 6-8 hours as needed for pain. No more than 3000 mg daily. 5) Follow up with me for the injections.

## 2022-03-24 NOTE — TELEPHONE ENCOUNTER
Per Medicare guidelines authorization is not required for BILATERAL knee CSI under ultrasound guidance cpt codes 53611,  x 2. Will inform Nursing.

## 2022-03-29 ENCOUNTER — PATIENT MESSAGE (OUTPATIENT)
Dept: PHYSICAL MEDICINE AND REHAB | Facility: CLINIC | Age: 77
End: 2022-03-29

## 2022-03-30 RX ORDER — SERTRALINE HYDROCHLORIDE 100 MG/1
100 TABLET, FILM COATED ORAL NIGHTLY
Qty: 30 TABLET | Refills: 0 | Status: SHIPPED | OUTPATIENT
Start: 2022-03-30

## 2022-04-04 ENCOUNTER — OFFICE VISIT (OUTPATIENT)
Dept: PHYSICAL MEDICINE AND REHAB | Facility: CLINIC | Age: 77
End: 2022-04-04
Payer: MEDICARE

## 2022-04-04 DIAGNOSIS — M25.511 CHRONIC PAIN OF BOTH SHOULDERS: Primary | ICD-10-CM

## 2022-04-04 DIAGNOSIS — M11.20 CALCIUM PYROPHOSPHATE DEPOSITION DISEASE (CPPD): ICD-10-CM

## 2022-04-04 DIAGNOSIS — M51.26 BULGE OF LUMBAR DISC WITHOUT MYELOPATHY: ICD-10-CM

## 2022-04-04 DIAGNOSIS — M54.59 MECHANICAL LOW BACK PAIN: ICD-10-CM

## 2022-04-04 DIAGNOSIS — M25.562 CHRONIC PAIN OF BOTH KNEES: ICD-10-CM

## 2022-04-04 DIAGNOSIS — M25.512 CHRONIC PAIN OF BOTH SHOULDERS: Primary | ICD-10-CM

## 2022-04-04 DIAGNOSIS — G89.29 CHRONIC PAIN OF BOTH SHOULDERS: Primary | ICD-10-CM

## 2022-04-04 DIAGNOSIS — M25.561 CHRONIC PAIN OF BOTH KNEES: ICD-10-CM

## 2022-04-04 DIAGNOSIS — M17.11 PRIMARY OSTEOARTHRITIS OF RIGHT KNEE: ICD-10-CM

## 2022-04-04 DIAGNOSIS — M67.919 TENDINOPATHY OF ROTATOR CUFF, UNSPECIFIED LATERALITY: ICD-10-CM

## 2022-04-04 DIAGNOSIS — M17.0 PRIMARY OSTEOARTHRITIS OF BOTH KNEES: ICD-10-CM

## 2022-04-04 DIAGNOSIS — M70.50 PES ANSERINE BURSITIS: ICD-10-CM

## 2022-04-04 DIAGNOSIS — I10 ESSENTIAL HYPERTENSION: ICD-10-CM

## 2022-04-04 DIAGNOSIS — M51.37 DDD (DEGENERATIVE DISC DISEASE), LUMBOSACRAL: ICD-10-CM

## 2022-04-04 DIAGNOSIS — M22.2X9 PATELLOFEMORAL PAIN SYNDROME, UNSPECIFIED LATERALITY: ICD-10-CM

## 2022-04-04 DIAGNOSIS — G89.29 CHRONIC PAIN OF BOTH KNEES: ICD-10-CM

## 2022-04-04 DIAGNOSIS — M99.9 BIOMECHANICAL LESION: ICD-10-CM

## 2022-04-04 DIAGNOSIS — M48.061 LUMBAR FORAMINAL STENOSIS: ICD-10-CM

## 2022-04-04 PROCEDURE — 20611 DRAIN/INJ JOINT/BURSA W/US: CPT | Performed by: PHYSICAL MEDICINE & REHABILITATION

## 2022-04-04 RX ORDER — TRIAMCINOLONE ACETONIDE 40 MG/ML
80 INJECTION, SUSPENSION INTRA-ARTICULAR; INTRAMUSCULAR ONCE
Status: COMPLETED | OUTPATIENT
Start: 2022-04-04 | End: 2022-04-04

## 2022-04-04 RX ORDER — LIDOCAINE HYDROCHLORIDE 10 MG/ML
18 INJECTION, SOLUTION INFILTRATION; PERINEURAL ONCE
Status: COMPLETED | OUTPATIENT
Start: 2022-04-04 | End: 2022-04-04

## 2022-04-04 RX ADMIN — TRIAMCINOLONE ACETONIDE 80 MG: 40 INJECTION, SUSPENSION INTRA-ARTICULAR; INTRAMUSCULAR at 18:05:00

## 2022-04-04 RX ADMIN — LIDOCAINE HYDROCHLORIDE 18 ML: 10 INJECTION, SOLUTION INFILTRATION; PERINEURAL at 18:05:00

## 2022-04-04 NOTE — PATIENT INSTRUCTIONS
Post Injection Instructions     1. Please do not do anything strenuous over the next two days (if you had a knee injection do not walk more than 2 city blocks, do not attend any aerobic classes, do not run, no heavy lifting, no prolong standing). 2. You may resume your day to day activities after your injection. 3. You may experience some mild amount of swelling after the procedure. 4. Please ice your joint that was injected at least 5-6 times a day (15 minutes) for two days after (this will help prevent worsening pain that sometimes occurs after an injection). 5. Only take tylenol if needed for pain for the first few days. 6. Watch for signs of infection which include redness, warmth, worsening pain, fevers or chills. If you develop any of these signs call the office immediately at [phone]    Everyone responds differently to injections, but you can expect your peak effects a few weeks after your last injection. Kai Gibbons.  Neville Allen MD  Physical Medicine and Rehabilitation/Sports Medicine  [facility]

## 2022-05-21 ENCOUNTER — APPOINTMENT (OUTPATIENT)
Dept: GENERAL RADIOLOGY | Facility: HOSPITAL | Age: 77
End: 2022-05-21
Attending: EMERGENCY MEDICINE
Payer: MEDICARE

## 2022-05-21 ENCOUNTER — HOSPITAL ENCOUNTER (EMERGENCY)
Facility: HOSPITAL | Age: 77
Discharge: HOME | End: 2022-05-21
Attending: EMERGENCY MEDICINE
Payer: MEDICARE

## 2022-05-21 VITALS
BODY MASS INDEX: 26.61 KG/M2 | RESPIRATION RATE: 18 BRPM | DIASTOLIC BLOOD PRESSURE: 56 MMHG | TEMPERATURE: 98 F | HEIGHT: 59 IN | SYSTOLIC BLOOD PRESSURE: 140 MMHG | HEART RATE: 64 BPM | WEIGHT: 132 LBS | OXYGEN SATURATION: 99 %

## 2022-05-21 DIAGNOSIS — S62.317A CLOSED DISPLACED FRACTURE OF BASE OF FIFTH METACARPAL BONE OF LEFT HAND, INITIAL ENCOUNTER: Primary | ICD-10-CM

## 2022-05-21 PROCEDURE — 99283 EMERGENCY DEPT VISIT LOW MDM: CPT

## 2022-05-21 PROCEDURE — 73130 X-RAY EXAM OF HAND: CPT | Performed by: EMERGENCY MEDICINE

## 2022-05-21 PROCEDURE — 73100 X-RAY EXAM OF WRIST: CPT | Performed by: EMERGENCY MEDICINE

## 2022-05-21 PROCEDURE — 29125 APPL SHORT ARM SPLINT STATIC: CPT

## 2022-05-21 NOTE — ED INITIAL ASSESSMENT (HPI)
Pt presents s/p chasing dog outside in grass yesterday and tripped and fell onto left hand. Pt denies head injury or LOC. Pt presents for pain and bruising to left hand and wrist. Pt reports she also forgot to take her blood pressure medicine today.

## 2022-06-06 ENCOUNTER — OFFICE VISIT (OUTPATIENT)
Dept: PHYSICAL MEDICINE AND REHAB | Facility: CLINIC | Age: 77
End: 2022-06-06
Payer: MEDICARE

## 2022-06-06 ENCOUNTER — TELEPHONE (OUTPATIENT)
Dept: NEUROLOGY | Facility: CLINIC | Age: 77
End: 2022-06-06

## 2022-06-06 VITALS
WEIGHT: 130.63 LBS | OXYGEN SATURATION: 99 % | SYSTOLIC BLOOD PRESSURE: 136 MMHG | BODY MASS INDEX: 26 KG/M2 | DIASTOLIC BLOOD PRESSURE: 58 MMHG | HEART RATE: 59 BPM

## 2022-06-06 DIAGNOSIS — M70.50 PES ANSERINE BURSITIS: Primary | ICD-10-CM

## 2022-06-06 DIAGNOSIS — M17.11 PRIMARY OSTEOARTHRITIS OF RIGHT KNEE: ICD-10-CM

## 2022-06-06 DIAGNOSIS — M11.20 CALCIUM PYROPHOSPHATE DEPOSITION DISEASE (CPPD): ICD-10-CM

## 2022-06-06 DIAGNOSIS — M22.2X9 PATELLOFEMORAL PAIN SYNDROME, UNSPECIFIED LATERALITY: ICD-10-CM

## 2022-06-06 RX ORDER — TRIAMCINOLONE ACETONIDE 40 MG/ML
40 INJECTION, SUSPENSION INTRA-ARTICULAR; INTRAMUSCULAR ONCE
Status: COMPLETED | OUTPATIENT
Start: 2022-06-06 | End: 2022-06-06

## 2022-06-06 RX ORDER — LIDOCAINE HYDROCHLORIDE 10 MG/ML
2 INJECTION, SOLUTION INFILTRATION; PERINEURAL ONCE
Status: COMPLETED | OUTPATIENT
Start: 2022-06-06 | End: 2022-06-06

## 2022-06-06 NOTE — PATIENT INSTRUCTIONS
1) Make an appointment to speak with Dr. Jason Harper regarding medication causing drowsiness  2) My office will call you to schedule the RIGHT Pes Anserine CSi once the procedure is approved by your insurance carrier. 3) Follow up with me in 2 months  4) Continue home exercise program  5) Continue Tylenol as needed for pain. Tylenol 500-1000 mg every 6-8 hours as needed for pain. No more than 3000 mg daily. Post Injection Instructions     1. Please do not do anything strenuous over the next two days (if you had a knee injection do not walk more than 2 city blocks, do not attend any aerobic classes, do not run, no heavy lifting, no prolong standing). 2. You may resume your day to day activities after your injection. 3. You may experience some mild amount of swelling after the procedure. 4. Please ice your joint that was injected at least 5-6 times a day (15 minutes) for two days after (this will help prevent worsening pain that sometimes occurs after an injection). 5. Only take tylenol if needed for pain for the first few days. 6. Watch for signs of infection which include redness, warmth, worsening pain, fevers or chills. If you develop any of these signs call the office immediately at [phone]    Everyone responds differently to injections, but you can expect your peak effects a few weeks after your last injection. Zach Robles.  Lanre Andrade MD  Physical Medicine and Rehabilitation/Sports Medicine  [facility]

## 2022-06-06 NOTE — TELEPHONE ENCOUNTER
Per authorization is not required for RIGHT pes anserine bursa CSI cpt codes 62123, . Will inform Nursing.

## 2022-06-15 ENCOUNTER — TELEPHONE (OUTPATIENT)
Dept: NEUROLOGY | Facility: CLINIC | Age: 77
End: 2022-06-15

## 2022-06-15 DIAGNOSIS — M47.816 LUMBAR SPONDYLOSIS: Primary | ICD-10-CM

## 2022-06-15 NOTE — TELEPHONE ENCOUNTER
Pt daughter call to let Clemente Tinoco know pt has been having pain oh rsoita right knee sense the day of the inj ( 6/06/22). Pt has been taking tylenol but does know the grams , but is not helping .   Please assist

## 2022-06-16 ENCOUNTER — TELEPHONE (OUTPATIENT)
Dept: NEUROLOGY | Facility: CLINIC | Age: 77
End: 2022-06-16

## 2022-06-16 NOTE — TELEPHONE ENCOUNTER
Per medicare guidelines authorization is not required for Bilateral knee CSI under ultrasound guidance cpt codes 56307,  x 2. To be done  on 7/7/22. Will inform Nursing.

## 2022-06-16 NOTE — TELEPHONE ENCOUNTER
Spoke with patient to continue rx. Schedule an appointment for 6/23/22 for follow up and 7/7/22 for bilateral knee injections order has been placed.

## 2022-06-16 NOTE — TELEPHONE ENCOUNTER
(H)# is incorrect. Spoke with Daughter(Mady)    Condition update after Procedure. - Patient had RIGHT pes anserine bursae corticosteroid injection (specific procedure) on 22 (date) with Dr.Behar.  - 100% relief for two days. If pain is worse:  - Pain level prior to procedure:   8/10  - Current pain level:  10.    - Pain location: Right knee that radiates to heel of foot that's a pulling sensation. Denies N/T.    - Pain description: sharp/stabbing and throbbing pain worsened in sitting to standing positions and walking up/down stairs. - Current pain treatment: OTC medications & heat. Denies PT. Using cane frequently. - Current prescription pain medications/dosin tab of Tramadol 50mg daily and Tylenol 2 tabs of 600mg BID no relief. - LOV: 22 Antonia Burgos MD    - NOV: Visit date not found   - Plan from LOV: Follow up 2months.

## 2022-06-20 DIAGNOSIS — M48.061 LUMBAR FORAMINAL STENOSIS: ICD-10-CM

## 2022-06-20 DIAGNOSIS — M47.816 LUMBAR SPONDYLOSIS: ICD-10-CM

## 2022-06-20 DIAGNOSIS — G89.29 CHRONIC PAIN OF BOTH SHOULDERS: ICD-10-CM

## 2022-06-20 DIAGNOSIS — M79.10 MYALGIA: ICD-10-CM

## 2022-06-20 DIAGNOSIS — M54.59 LUMBAR TRIGGER POINT SYNDROME: ICD-10-CM

## 2022-06-20 DIAGNOSIS — M51.37 DDD (DEGENERATIVE DISC DISEASE), LUMBOSACRAL: ICD-10-CM

## 2022-06-20 DIAGNOSIS — M47.816 FACET SYNDROME, LUMBAR: ICD-10-CM

## 2022-06-20 DIAGNOSIS — M67.919 TENDINOPATHY OF ROTATOR CUFF, UNSPECIFIED LATERALITY: ICD-10-CM

## 2022-06-20 DIAGNOSIS — M51.26 BULGE OF LUMBAR DISC WITHOUT MYELOPATHY: ICD-10-CM

## 2022-06-20 DIAGNOSIS — M25.512 CHRONIC PAIN OF BOTH SHOULDERS: ICD-10-CM

## 2022-06-20 DIAGNOSIS — M99.9 BIOMECHANICAL LESION: ICD-10-CM

## 2022-06-20 DIAGNOSIS — M25.511 CHRONIC PAIN OF BOTH SHOULDERS: ICD-10-CM

## 2022-06-20 DIAGNOSIS — M54.59 MECHANICAL LOW BACK PAIN: ICD-10-CM

## 2022-06-20 RX ORDER — SERTRALINE HYDROCHLORIDE 100 MG/1
TABLET, FILM COATED ORAL
Qty: 30 TABLET | Refills: 0 | Status: SHIPPED | OUTPATIENT
Start: 2022-06-20

## 2022-06-20 RX ORDER — PREGABALIN 100 MG/1
CAPSULE ORAL
Qty: 60 CAPSULE | Refills: 0 | Status: SHIPPED | OUTPATIENT
Start: 2022-06-20

## 2022-06-20 NOTE — TELEPHONE ENCOUNTER
Refill Request    Medication request: sertraline 100 MG Oral Tab    LOV:6/6/2022 Adam Alaniz MD   Due back to clinic per last office note:  2M  NOV: 6/23/2022 Adam Alaniz MD      [place]: 3/30/22  #30    Urine drug screen (if applicable): None  Pain contract: NonE    LOV plan (if weaning or changing medications): N/A.          Refill Request    Medication request: pregabalin 100 MG Oral Cap      [place] 5/11/22#60

## 2022-06-22 RX ORDER — DICLOFENAC SODIUM 75 MG/1
TABLET, DELAYED RELEASE ORAL
Qty: 180 TABLET | Refills: 0 | Status: SHIPPED | OUTPATIENT
Start: 2022-06-22

## 2022-06-23 ENCOUNTER — OFFICE VISIT (OUTPATIENT)
Dept: PHYSICAL MEDICINE AND REHAB | Facility: CLINIC | Age: 77
End: 2022-06-23
Payer: MEDICARE

## 2022-06-23 ENCOUNTER — PATIENT MESSAGE (OUTPATIENT)
Dept: NEUROLOGY | Facility: CLINIC | Age: 77
End: 2022-06-23

## 2022-06-23 VITALS
OXYGEN SATURATION: 99 % | BODY MASS INDEX: 25.6 KG/M2 | DIASTOLIC BLOOD PRESSURE: 58 MMHG | HEIGHT: 60 IN | HEART RATE: 66 BPM | SYSTOLIC BLOOD PRESSURE: 104 MMHG | WEIGHT: 130.38 LBS

## 2022-06-23 DIAGNOSIS — M22.2X9 PATELLOFEMORAL PAIN SYNDROME, UNSPECIFIED LATERALITY: Primary | ICD-10-CM

## 2022-06-23 DIAGNOSIS — G89.29 CHRONIC PAIN OF BOTH KNEES: ICD-10-CM

## 2022-06-23 DIAGNOSIS — M25.561 CHRONIC PAIN OF BOTH KNEES: ICD-10-CM

## 2022-06-23 DIAGNOSIS — M25.562 CHRONIC PAIN OF BOTH KNEES: ICD-10-CM

## 2022-06-23 DIAGNOSIS — M89.8X6 BILATERAL TIBIAL PAIN: ICD-10-CM

## 2022-06-23 DIAGNOSIS — I10 ESSENTIAL HYPERTENSION: ICD-10-CM

## 2022-06-23 DIAGNOSIS — S86.899D MEDIAL TIBIAL STRESS SYNDROME, UNSPECIFIED LATERALITY, SUBSEQUENT ENCOUNTER: ICD-10-CM

## 2022-06-23 DIAGNOSIS — M11.20 CALCIUM PYROPHOSPHATE DEPOSITION DISEASE (CPPD): ICD-10-CM

## 2022-06-23 DIAGNOSIS — M17.11 PRIMARY OSTEOARTHRITIS OF RIGHT KNEE: ICD-10-CM

## 2022-06-23 DIAGNOSIS — M70.50 PES ANSERINE BURSITIS: ICD-10-CM

## 2022-06-23 DIAGNOSIS — M17.0 PRIMARY OSTEOARTHRITIS OF BOTH KNEES: ICD-10-CM

## 2022-06-23 PROCEDURE — 99214 OFFICE O/P EST MOD 30 MIN: CPT | Performed by: PHYSICAL MEDICINE & REHABILITATION

## 2022-06-23 NOTE — TELEPHONE ENCOUNTER
MRI KNEE, RIGHT (CPT=73721)    Status: Approved- No pre-certification required. Per Medicare Guidelines; request is a covered benefit and pre-certification is not require. All Medicare coverage is based on Medical Necessity.    Notified patient via [address] [place] [address] [address]

## 2022-06-23 NOTE — PATIENT INSTRUCTIONS
1 My office will call you once the MRI is approved by your insurance. You should then schedule the MRI and call my office again to make an appointment with me 2-3 days after your exam for review of your images and a further plan. If your MRI is not being performed at [facility] or its affiliates, please make sure to bring a copy of the images. 2) Continue Diclofenac twice per day as needed for pain. This was recently refilled  3) Tylenol 500-1000 mg every 6-8 hours as needed for pain. No more than 3000 mg daily.   4) Follow up with me to review the images and we can decide if we should repeat the bilateral knee CSI after July 4, 2022  5) Make an appointment with a PCP

## 2022-07-07 ENCOUNTER — OFFICE VISIT (OUTPATIENT)
Dept: PHYSICAL MEDICINE AND REHAB | Facility: CLINIC | Age: 77
End: 2022-07-07
Payer: MEDICARE

## 2022-07-07 DIAGNOSIS — M22.2X9 PATELLOFEMORAL PAIN SYNDROME, UNSPECIFIED LATERALITY: Primary | ICD-10-CM

## 2022-07-07 DIAGNOSIS — M17.0 PRIMARY OSTEOARTHRITIS OF BOTH KNEES: ICD-10-CM

## 2022-07-07 DIAGNOSIS — M76.51 PATELLAR TENDONITIS OF RIGHT KNEE: ICD-10-CM

## 2022-07-07 DIAGNOSIS — M25.561 CHRONIC PAIN OF BOTH KNEES: ICD-10-CM

## 2022-07-07 DIAGNOSIS — M11.20 CALCIUM PYROPHOSPHATE DEPOSITION DISEASE (CPPD): ICD-10-CM

## 2022-07-07 DIAGNOSIS — M17.11 PRIMARY OSTEOARTHRITIS OF RIGHT KNEE: ICD-10-CM

## 2022-07-07 DIAGNOSIS — M25.562 CHRONIC PAIN OF BOTH KNEES: ICD-10-CM

## 2022-07-07 DIAGNOSIS — M70.50 PES ANSERINE BURSITIS: ICD-10-CM

## 2022-07-07 DIAGNOSIS — G89.29 CHRONIC PAIN OF BOTH KNEES: ICD-10-CM

## 2022-07-07 PROCEDURE — 99214 OFFICE O/P EST MOD 30 MIN: CPT | Performed by: PHYSICAL MEDICINE & REHABILITATION

## 2022-07-07 NOTE — PATIENT INSTRUCTIONS
1) Purchase a Cho-pat strap ([employer], [employer])  2) DO eccentric exercises for the patella tendon  3) Use ice over the knee tendon in front  4) Follow up with me after the MRI to review the images. If the knee pain persists, and depending on what we see In the MRI, we can perform the knee injections  5) Tylenol 500-1000 mg every 6-8 hours as needed for pain. No more than 3000 mg daily.   6) Continue using Marijuana cream over the area

## 2022-07-08 ENCOUNTER — HOSPITAL ENCOUNTER (OUTPATIENT)
Dept: MRI IMAGING | Facility: HOSPITAL | Age: 77
Discharge: HOME | End: 2022-07-08
Attending: PHYSICAL MEDICINE & REHABILITATION
Payer: MEDICARE

## 2022-07-08 DIAGNOSIS — S86.899D MEDIAL TIBIAL STRESS SYNDROME, UNSPECIFIED LATERALITY, SUBSEQUENT ENCOUNTER: ICD-10-CM

## 2022-07-08 DIAGNOSIS — M25.562 CHRONIC PAIN OF BOTH KNEES: ICD-10-CM

## 2022-07-08 DIAGNOSIS — I10 ESSENTIAL HYPERTENSION: ICD-10-CM

## 2022-07-08 DIAGNOSIS — G89.29 CHRONIC PAIN OF BOTH KNEES: ICD-10-CM

## 2022-07-08 DIAGNOSIS — M17.0 PRIMARY OSTEOARTHRITIS OF BOTH KNEES: ICD-10-CM

## 2022-07-08 DIAGNOSIS — M11.20 CALCIUM PYROPHOSPHATE DEPOSITION DISEASE (CPPD): ICD-10-CM

## 2022-07-08 DIAGNOSIS — M22.2X9 PATELLOFEMORAL PAIN SYNDROME, UNSPECIFIED LATERALITY: ICD-10-CM

## 2022-07-08 DIAGNOSIS — M89.8X6 BILATERAL TIBIAL PAIN: ICD-10-CM

## 2022-07-08 DIAGNOSIS — M25.561 CHRONIC PAIN OF BOTH KNEES: ICD-10-CM

## 2022-07-08 DIAGNOSIS — M70.50 PES ANSERINE BURSITIS: ICD-10-CM

## 2022-07-08 DIAGNOSIS — M17.11 PRIMARY OSTEOARTHRITIS OF RIGHT KNEE: ICD-10-CM

## 2022-07-08 PROCEDURE — 73721 MRI JNT OF LWR EXTRE W/O DYE: CPT | Performed by: PHYSICAL MEDICINE & REHABILITATION

## 2022-07-19 DIAGNOSIS — M25.512 CHRONIC PAIN OF BOTH SHOULDERS: ICD-10-CM

## 2022-07-19 DIAGNOSIS — M79.10 MYALGIA: ICD-10-CM

## 2022-07-19 DIAGNOSIS — M51.36 BULGE OF LUMBAR DISC WITHOUT MYELOPATHY: ICD-10-CM

## 2022-07-19 DIAGNOSIS — M25.511 CHRONIC PAIN OF BOTH SHOULDERS: ICD-10-CM

## 2022-07-19 DIAGNOSIS — M47.816 FACET SYNDROME, LUMBAR: ICD-10-CM

## 2022-07-19 DIAGNOSIS — M54.59 MECHANICAL LOW BACK PAIN: ICD-10-CM

## 2022-07-19 DIAGNOSIS — M54.59 LUMBAR TRIGGER POINT SYNDROME: ICD-10-CM

## 2022-07-19 DIAGNOSIS — M48.061 LUMBAR FORAMINAL STENOSIS: ICD-10-CM

## 2022-07-19 DIAGNOSIS — M99.9 BIOMECHANICAL LESION: ICD-10-CM

## 2022-07-19 DIAGNOSIS — M51.37 DDD (DEGENERATIVE DISC DISEASE), LUMBOSACRAL: ICD-10-CM

## 2022-07-19 DIAGNOSIS — M67.919 TENDINOPATHY OF ROTATOR CUFF, UNSPECIFIED LATERALITY: ICD-10-CM

## 2022-07-19 DIAGNOSIS — G89.29 CHRONIC PAIN OF BOTH SHOULDERS: ICD-10-CM

## 2022-07-19 DIAGNOSIS — M47.816 LUMBAR SPONDYLOSIS: ICD-10-CM

## 2022-07-19 RX ORDER — SERTRALINE HYDROCHLORIDE 100 MG/1
TABLET, FILM COATED ORAL
Qty: 30 TABLET | Refills: 0 | Status: SHIPPED | OUTPATIENT
Start: 2022-07-19

## 2022-07-19 RX ORDER — PREGABALIN 100 MG/1
CAPSULE ORAL
Qty: 60 CAPSULE | Refills: 0 | Status: SHIPPED | OUTPATIENT
Start: 2022-07-19

## 2022-07-19 NOTE — TELEPHONE ENCOUNTER
Refill Request    Medication request: DICLOFENAC 75 MG Oral Tab EC    LOV:7/7/2022 Jeff Harmon MD   Due back to clinic per last office note:  Saul Cook: 8/8/2022 Jeff Harmon MD      [place]: 6/22/22 #180    Urine drug screen (if applicable): None  Pain contract: None    LOV plan (if weaning or changing medications):   N/A     Medication request: SERTRALINE 100 MG Oral Tab  6/20/22  Qty#30 R-0

## 2022-08-08 ENCOUNTER — OFFICE VISIT (OUTPATIENT)
Dept: PHYSICAL MEDICINE AND REHAB | Facility: CLINIC | Age: 77
End: 2022-08-08
Payer: MEDICARE

## 2022-08-08 ENCOUNTER — TELEPHONE (OUTPATIENT)
Dept: NEUROLOGY | Facility: CLINIC | Age: 77
End: 2022-08-08

## 2022-08-08 VITALS
DIASTOLIC BLOOD PRESSURE: 70 MMHG | SYSTOLIC BLOOD PRESSURE: 126 MMHG | BODY MASS INDEX: 25.88 KG/M2 | HEART RATE: 63 BPM | WEIGHT: 131.81 LBS | OXYGEN SATURATION: 99 % | HEIGHT: 60 IN

## 2022-08-08 DIAGNOSIS — M22.2X9 PATELLOFEMORAL PAIN SYNDROME, UNSPECIFIED LATERALITY: Primary | ICD-10-CM

## 2022-08-08 DIAGNOSIS — M70.50 PES ANSERINE BURSITIS: ICD-10-CM

## 2022-08-08 DIAGNOSIS — M17.12 PRIMARY OSTEOARTHRITIS OF LEFT KNEE: ICD-10-CM

## 2022-08-08 DIAGNOSIS — M17.11 PRIMARY OSTEOARTHRITIS OF RIGHT KNEE: ICD-10-CM

## 2022-08-08 NOTE — TELEPHONE ENCOUNTER
Per Medicare guidelines authorization is not required for RIGHT knee Durolane and CSI under ultrasound guidance cpt codes 05963, , . Will inform Nursing.

## 2022-08-08 NOTE — PATIENT INSTRUCTIONS
1) Purchase a [place] Knee brace with patella stabilizing support  2) My office will call you to schedule the RIGHT knee HA and CSI under ultrasound guidance once the procedure is approved by your insurance carrier. 3) Continue with home exercise program focusing on patella stabilizing exercises. 4) Continue Diclofenac and Tylenol as needed  5) Use ice 3-4 times per day  6) Try Glucosamine with chondroitin 1500/1200 mg daily.

## 2022-08-11 ENCOUNTER — APPOINTMENT (OUTPATIENT)
Dept: ULTRASOUND IMAGING | Facility: HOSPITAL | Age: 77
End: 2022-08-11
Attending: EMERGENCY MEDICINE
Payer: MEDICARE

## 2022-08-11 ENCOUNTER — APPOINTMENT (OUTPATIENT)
Dept: GENERAL RADIOLOGY | Age: 77
End: 2022-08-11
Attending: NURSE PRACTITIONER
Payer: MEDICARE

## 2022-08-11 ENCOUNTER — HOSPITAL ENCOUNTER (OUTPATIENT)
Age: 77
Discharge: HOME | End: 2022-08-11
Payer: MEDICARE

## 2022-08-11 ENCOUNTER — APPOINTMENT (OUTPATIENT)
Dept: GENERAL RADIOLOGY | Facility: HOSPITAL | Age: 77
End: 2022-08-11
Attending: EMERGENCY MEDICINE
Payer: MEDICARE

## 2022-08-11 ENCOUNTER — HOSPITAL ENCOUNTER (EMERGENCY)
Facility: HOSPITAL | Age: 77
Discharge: HOME | End: 2022-08-11
Attending: EMERGENCY MEDICINE
Payer: MEDICARE

## 2022-08-11 VITALS
OXYGEN SATURATION: 97 % | HEIGHT: 60 IN | HEART RATE: 97 BPM | TEMPERATURE: 99 F | BODY MASS INDEX: 25.72 KG/M2 | WEIGHT: 131 LBS | SYSTOLIC BLOOD PRESSURE: 166 MMHG | DIASTOLIC BLOOD PRESSURE: 53 MMHG | RESPIRATION RATE: 16 BRPM

## 2022-08-11 VITALS
RESPIRATION RATE: 18 BRPM | HEIGHT: 60 IN | TEMPERATURE: 98 F | SYSTOLIC BLOOD PRESSURE: 117 MMHG | DIASTOLIC BLOOD PRESSURE: 54 MMHG | WEIGHT: 131 LBS | OXYGEN SATURATION: 99 % | HEART RATE: 65 BPM | BODY MASS INDEX: 25.72 KG/M2

## 2022-08-11 DIAGNOSIS — M25.561 RIGHT KNEE PAIN, UNSPECIFIED CHRONICITY: Primary | ICD-10-CM

## 2022-08-11 DIAGNOSIS — M79.604 RIGHT LEG PAIN: Primary | ICD-10-CM

## 2022-08-11 DIAGNOSIS — M17.11 OSTEOARTHRITIS OF RIGHT KNEE, UNSPECIFIED OSTEOARTHRITIS TYPE: ICD-10-CM

## 2022-08-11 PROCEDURE — 93971 EXTREMITY STUDY: CPT | Performed by: EMERGENCY MEDICINE

## 2022-08-11 PROCEDURE — 99205 OFFICE O/P NEW HI 60 MIN: CPT | Performed by: NURSE PRACTITIONER

## 2022-08-11 PROCEDURE — 73560 X-RAY EXAM OF KNEE 1 OR 2: CPT | Performed by: EMERGENCY MEDICINE

## 2022-08-11 PROCEDURE — 99284 EMERGENCY DEPT VISIT MOD MDM: CPT

## 2022-08-11 RX ORDER — MELOXICAM 7.5 MG/1
7.5 TABLET ORAL DAILY PRN
Qty: 14 TABLET | Refills: 0 | Status: CANCELLED | OUTPATIENT
Start: 2022-08-11 | End: 2022-08-25

## 2022-08-11 RX ORDER — MELOXICAM 7.5 MG/1
7.5 TABLET ORAL DAILY PRN
Qty: 14 TABLET | Refills: 0 | Status: SHIPPED | OUTPATIENT
Start: 2022-08-11 | End: 2022-08-25

## 2022-08-11 NOTE — ED INITIAL ASSESSMENT (HPI)
Patient from home with c/o right knee pain for the past 6 months that worsened on Tuesday. Patient denies injury. Minimal relief from Tylenol.

## 2022-08-11 NOTE — ED INITIAL ASSESSMENT (HPI)
Patient refuses a  and requests daughter to translate for her. Patient here for evluation of right knee pain that has been constant the last 2 days. Pain started when she was walking on a flat surface, felt and heard a pop from the right knee with weakness 2 days ago. Pain has not subsided. She has a history of right knee pain for the last 6-12 months. She has had falls in the past but nothing recently that caused this pain. States she saw a pain specialist for the knee and was told she has tendonitis. She has been taking tylenol extra strength PRN, last dose was 1500mg around 3:30pm. Denies any numbness/tingling but has burning that shoots down to the leg slightly. She has started using a brace to the right knee for the last 2 weeks.

## 2022-08-16 ENCOUNTER — OFFICE VISIT (OUTPATIENT)
Dept: PHYSICAL MEDICINE AND REHAB | Facility: CLINIC | Age: 77
End: 2022-08-16
Payer: MEDICAID

## 2022-08-16 DIAGNOSIS — M17.11 PRIMARY OSTEOARTHRITIS OF RIGHT KNEE: ICD-10-CM

## 2022-08-16 DIAGNOSIS — M22.2X9 PATELLOFEMORAL PAIN SYNDROME, UNSPECIFIED LATERALITY: Primary | ICD-10-CM

## 2022-08-16 PROCEDURE — 20611 DRAIN/INJ JOINT/BURSA W/US: CPT | Performed by: PHYSICAL MEDICINE & REHABILITATION

## 2022-08-16 RX ORDER — LIDOCAINE HYDROCHLORIDE 10 MG/ML
7 INJECTION, SOLUTION INFILTRATION; PERINEURAL ONCE
Status: COMPLETED | OUTPATIENT
Start: 2022-08-16 | End: 2022-08-16

## 2022-08-16 RX ORDER — TRIAMCINOLONE ACETONIDE 40 MG/ML
40 INJECTION, SUSPENSION INTRA-ARTICULAR; INTRAMUSCULAR ONCE
Status: COMPLETED | OUTPATIENT
Start: 2022-08-16 | End: 2022-08-16

## 2022-08-16 NOTE — PATIENT INSTRUCTIONS
Post Injection Instructions     1. Please do not do anything strenuous over the next two days (if you had a knee injection do not walk more than 2 city blocks, do not attend any aerobic classes, do not run, no heavy lifting, no prolong standing). 2. You may resume your day to day activities after your injection. 3. You may experience some mild amount of swelling after the procedure. 4. Please ice your joint that was injected at least 5-6 times a day (15 minutes) for two days after (this will help prevent worsening pain that sometimes occurs after an injection). 5. Only take tylenol if needed for pain for the first few days. 6. Watch for signs of infection which include redness, warmth, worsening pain, fevers or chills. If you develop any of these signs call the office immediately at [phone]    Everyone responds differently to injections, but you can expect your peak effects a few weeks after your last injection. Cony Rojass.  Alejandro Moses MD  Physical Medicine and Rehabilitation/Sports Medicine  [facility]

## 2022-08-24 ENCOUNTER — TELEPHONE (OUTPATIENT)
Dept: NEUROLOGY | Facility: CLINIC | Age: 77
End: 2022-08-24

## 2022-08-24 ENCOUNTER — PATIENT MESSAGE (OUTPATIENT)
Dept: PHYSICAL MEDICINE AND REHAB | Facility: CLINIC | Age: 77
End: 2022-08-24

## 2022-08-24 DIAGNOSIS — M22.2X9 PATELLOFEMORAL PAIN SYNDROME, UNSPECIFIED LATERALITY: ICD-10-CM

## 2022-08-24 DIAGNOSIS — M17.11 PRIMARY OSTEOARTHRITIS OF RIGHT KNEE: ICD-10-CM

## 2022-08-24 DIAGNOSIS — M17.12 PRIMARY OSTEOARTHRITIS OF LEFT KNEE: ICD-10-CM

## 2022-08-24 DIAGNOSIS — M89.8X6 BILATERAL TIBIAL PAIN: Primary | ICD-10-CM

## 2022-08-24 DIAGNOSIS — S86.899D MEDIAL TIBIAL STRESS SYNDROME, UNSPECIFIED LATERALITY, SUBSEQUENT ENCOUNTER: ICD-10-CM

## 2022-08-24 DIAGNOSIS — M70.50 PES ANSERINE BURSITIS: ICD-10-CM

## 2022-08-24 NOTE — TELEPHONE ENCOUNTER
Patient calling to speak to a nurse or Dr.Behar to inform that she remains in a lot of pain after her recent injection,

## 2022-08-24 NOTE — TELEPHONE ENCOUNTER
From: Leila Iyer  To: Asha Bueno MD  Sent: 8/24/2022 11:29 AM CDT  Subject: Update    Cass   I am Marck Flavors daughter. Jesenia Her asked me to inform the doctor of how she has been feeling. She is not doing well. The pain she says is a 8 and she has hard time walking, even with walker. If possible for the doctor to send her to physical therapy? Please advise.      Thank you   Mady

## 2022-08-29 DIAGNOSIS — M51.36 BULGE OF LUMBAR DISC WITHOUT MYELOPATHY: ICD-10-CM

## 2022-08-29 DIAGNOSIS — M25.511 CHRONIC PAIN OF BOTH SHOULDERS: ICD-10-CM

## 2022-08-29 DIAGNOSIS — M48.061 LUMBAR FORAMINAL STENOSIS: ICD-10-CM

## 2022-08-29 DIAGNOSIS — M54.59 MECHANICAL LOW BACK PAIN: ICD-10-CM

## 2022-08-29 DIAGNOSIS — M54.59 LUMBAR TRIGGER POINT SYNDROME: ICD-10-CM

## 2022-08-29 DIAGNOSIS — M25.512 CHRONIC PAIN OF BOTH SHOULDERS: ICD-10-CM

## 2022-08-29 DIAGNOSIS — M99.9 BIOMECHANICAL LESION: ICD-10-CM

## 2022-08-29 DIAGNOSIS — M47.816 LUMBAR SPONDYLOSIS: ICD-10-CM

## 2022-08-29 DIAGNOSIS — M79.10 MYALGIA: ICD-10-CM

## 2022-08-29 DIAGNOSIS — M47.816 FACET SYNDROME, LUMBAR: ICD-10-CM

## 2022-08-29 DIAGNOSIS — M67.919 TENDINOPATHY OF ROTATOR CUFF, UNSPECIFIED LATERALITY: ICD-10-CM

## 2022-08-29 DIAGNOSIS — M51.37 DDD (DEGENERATIVE DISC DISEASE), LUMBOSACRAL: ICD-10-CM

## 2022-08-29 DIAGNOSIS — G89.29 CHRONIC PAIN OF BOTH SHOULDERS: ICD-10-CM

## 2022-08-30 ENCOUNTER — PATIENT MESSAGE (OUTPATIENT)
Dept: PHYSICAL MEDICINE AND REHAB | Facility: CLINIC | Age: 77
End: 2022-08-30

## 2022-08-30 RX ORDER — PREGABALIN 100 MG/1
CAPSULE ORAL
Qty: 60 CAPSULE | Refills: 0 | Status: SHIPPED | OUTPATIENT
Start: 2022-08-30

## 2022-08-30 RX ORDER — SERTRALINE HYDROCHLORIDE 100 MG/1
TABLET, FILM COATED ORAL
Qty: 30 TABLET | Refills: 0 | Status: SHIPPED | OUTPATIENT
Start: 2022-08-30

## 2022-09-20 RX ORDER — NAPROXEN 500 MG/1
TABLET ORAL
Qty: 60 TABLET | Refills: 0 | Status: SHIPPED | OUTPATIENT
Start: 2022-09-20

## 2022-09-21 ENCOUNTER — OFFICE VISIT (OUTPATIENT)
Dept: PHYSICAL THERAPY | Facility: HOSPITAL | Age: 77
End: 2022-09-21
Attending: PHYSICAL MEDICINE & REHABILITATION
Payer: MEDICARE

## 2022-09-21 DIAGNOSIS — M89.8X6 BILATERAL TIBIAL PAIN: ICD-10-CM

## 2022-09-21 DIAGNOSIS — M22.2X9 PATELLOFEMORAL PAIN SYNDROME, UNSPECIFIED LATERALITY: ICD-10-CM

## 2022-09-21 DIAGNOSIS — M17.11 PRIMARY OSTEOARTHRITIS OF RIGHT KNEE: ICD-10-CM

## 2022-09-21 DIAGNOSIS — S86.899D MEDIAL TIBIAL STRESS SYNDROME, UNSPECIFIED LATERALITY, SUBSEQUENT ENCOUNTER: ICD-10-CM

## 2022-09-21 DIAGNOSIS — M70.50 PES ANSERINE BURSITIS: ICD-10-CM

## 2022-09-21 DIAGNOSIS — M17.12 PRIMARY OSTEOARTHRITIS OF LEFT KNEE: ICD-10-CM

## 2022-09-21 PROCEDURE — 97140 MANUAL THERAPY 1/> REGIONS: CPT

## 2022-09-21 PROCEDURE — 97161 PT EVAL LOW COMPLEX 20 MIN: CPT

## 2022-09-21 PROCEDURE — 97110 THERAPEUTIC EXERCISES: CPT

## 2022-09-22 ENCOUNTER — OFFICE VISIT (OUTPATIENT)
Dept: ORTHOPEDICS CLINIC | Facility: CLINIC | Age: 77
End: 2022-09-22

## 2022-09-22 VITALS — HEART RATE: 64 BPM | DIASTOLIC BLOOD PRESSURE: 75 MMHG | SYSTOLIC BLOOD PRESSURE: 132 MMHG

## 2022-09-22 DIAGNOSIS — M17.11 PRIMARY OSTEOARTHRITIS OF RIGHT KNEE: ICD-10-CM

## 2022-09-22 DIAGNOSIS — S83.231A COMPLEX TEAR OF MEDIAL MENISCUS OF RIGHT KNEE AS CURRENT INJURY, INITIAL ENCOUNTER: Primary | ICD-10-CM

## 2022-09-22 PROCEDURE — 99204 OFFICE O/P NEW MOD 45 MIN: CPT | Performed by: ORTHOPAEDIC SURGERY

## 2022-09-23 ENCOUNTER — OFFICE VISIT (OUTPATIENT)
Dept: PHYSICAL THERAPY | Facility: HOSPITAL | Age: 77
End: 2022-09-23
Attending: PHYSICAL MEDICINE & REHABILITATION
Payer: MEDICARE

## 2022-09-23 ENCOUNTER — OFFICE VISIT (OUTPATIENT)
Dept: FAMILY MEDICINE CLINIC | Facility: CLINIC | Age: 77
End: 2022-09-23

## 2022-09-23 VITALS
BODY MASS INDEX: 25.72 KG/M2 | HEART RATE: 57 BPM | DIASTOLIC BLOOD PRESSURE: 81 MMHG | HEIGHT: 60 IN | WEIGHT: 131 LBS | TEMPERATURE: 98 F | SYSTOLIC BLOOD PRESSURE: 168 MMHG

## 2022-09-23 DIAGNOSIS — Z23 NEED FOR SHINGLES VACCINE: Primary | ICD-10-CM

## 2022-09-23 DIAGNOSIS — Z23 NEEDS FLU SHOT: ICD-10-CM

## 2022-09-23 DIAGNOSIS — H57.9 EYE PROBLEM: ICD-10-CM

## 2022-09-23 DIAGNOSIS — M25.561 ACUTE PAIN OF RIGHT KNEE: ICD-10-CM

## 2022-09-23 PROBLEM — E21.3 HYPERPARATHYROIDISM (HCC): Status: ACTIVE | Noted: 2017-03-15

## 2022-09-23 PROBLEM — D69.6 THROMBOCYTOPENIA (HCC): Status: ACTIVE | Noted: 2020-08-27

## 2022-09-23 PROBLEM — D70.9 NEUTROPENIC COLITIS (HCC): Status: ACTIVE | Noted: 2022-09-23

## 2022-09-23 PROBLEM — F03.90 SENILE DEMENTIA, UNCOMPLICATED (HCC): Status: ACTIVE | Noted: 2022-09-23

## 2022-09-23 PROBLEM — F33.1 MODERATE EPISODE OF RECURRENT MAJOR DEPRESSIVE DISORDER (HCC): Status: ACTIVE | Noted: 2022-09-23

## 2022-09-23 PROBLEM — K52.89 NEUTROPENIC COLITIS (HCC): Status: ACTIVE | Noted: 2022-09-23

## 2022-09-23 PROCEDURE — 90662 IIV NO PRSV INCREASED AG IM: CPT | Performed by: FAMILY MEDICINE

## 2022-09-23 PROCEDURE — G0008 ADMIN INFLUENZA VIRUS VAC: HCPCS | Performed by: FAMILY MEDICINE

## 2022-09-23 PROCEDURE — 97110 THERAPEUTIC EXERCISES: CPT

## 2022-09-23 PROCEDURE — 97140 MANUAL THERAPY 1/> REGIONS: CPT

## 2022-09-23 PROCEDURE — 99214 OFFICE O/P EST MOD 30 MIN: CPT | Performed by: FAMILY MEDICINE

## 2022-09-23 RX ORDER — TRAMADOL HYDROCHLORIDE 50 MG/1
50 TABLET ORAL EVERY 6 HOURS PRN
Qty: 30 TABLET | Refills: 0 | Status: SHIPPED | OUTPATIENT
Start: 2022-09-23

## 2022-09-23 RX ORDER — ZOSTER VACCINE RECOMBINANT, ADJUVANTED 50 MCG/0.5
0.5 KIT INTRAMUSCULAR ONCE
Qty: 1 EACH | Refills: 0 | Status: SHIPPED | OUTPATIENT
Start: 2022-09-23 | End: 2022-09-23

## 2022-09-23 RX ORDER — DICLOFENAC SODIUM 1 MG/ML
SOLUTION/ DROPS OPHTHALMIC
Qty: 5 ML | Refills: 0 | Status: SHIPPED | OUTPATIENT
Start: 2022-09-23

## 2022-09-23 NOTE — PATIENT INSTRUCTIONS
Please call your orthopedic surgeon and have them schedule you for surgery. Please let me know when the surgery date is and I will see you within 30 days for your preop exam.    Please bring your bag of medications or a list of medications that you take.

## 2022-09-23 NOTE — PROGRESS NOTES
Diagnosis:   Bilateral tibial pain (M89.8X6)  Medial tibial stress syndrome, unspecified laterality, subsequent encounter (S86.899D)  Pes anserine bursitis (M70.50)  Primary osteoarthritis of left knee (M17.12)  Primary osteoarthritis of right knee (M17.11)  Patellofemoral pain syndrome, unspecified laterality (F40.8W9)      Referring Provider: Abraham Huynh  Date of Evaluation:    9/21/2022    Precautions:  None Next MD visit:   none scheduled  Date of Surgery: n/a   Insurance Primary/Secondary: MEDICARE / Amira King     # Auth Visits: 10            Subjective: Pt's grand dtr present to translate. Per pt's grand daughter, pt is thinking about pursuing surgery from the recommendation of the Dr due to meniscal tear. Walking still hurts. Pain: 5/10 R anterior and medial knee      Objective:     AROM: (* denotes performed with pain)  Knee Foot/Ankle   Flexion: R 128 pain improved to 135; L 135  Extension: R 0 pain improved to 4 deg pain-free; L 3    DF: R 0; L 0  PF: R WNL; L WNL     Accessory motion: anterior and medial tibial glide restricted R. Assessment: Responding well to mobilizations and unloading exs with minimal pain reported throughout session. Goals: (to be met in 10 visits)  Restore full knee AROM pain-free. Negotiate stairs reciprocally max 3/10 pain to be able to get around her house without difficulty. Up to 15 min walking to be able to go grocery shopping without difficulty max 3/10 pain  Pt will be able tolerate standing up to 15 min to be able to perform chores at home and run errands with max 3/10 pain. Be able to transition from STS without difficulty and max 3/10 pain to get up from chair or get out of car. [place] with HEP. Plan: Continue manual therapy to address jt restrictions, progress R knee ROM and functional RLE strengthening. Date: 9/23/2022  TX#: 2/10 Date:                 TX#: 3/ Date:                 TX#: 4/ Date:                 TX#: 5/ Date:    Tx#: 6/ MT  -Posterior femoral glide R gr 3  -anterior tibial glide R gr 3  -R fibular head posterior glide gr 3  -R tibial posterior glide with IR gr 3       EX  -SLR 2x10 RLE  -bridging 3x10  -Shuttle knee unloading x5 min 25 lbs  -DL HR 2x10  -LAQ x20 hold 5 sec  -PROM R knee flexion and ext with OP       HEP - added bridging, SLR, and LAQ                Charges: MT, EX 2       Total Timed Treatment: MT 15 min, EX 25 min  Total Treatment Time: 42 min

## 2022-09-26 ENCOUNTER — TELEPHONE (OUTPATIENT)
Dept: ORTHOPEDICS CLINIC | Facility: CLINIC | Age: 77
End: 2022-09-26

## 2022-09-26 NOTE — TELEPHONE ENCOUNTER
Refill Request    Medication request: DICLOFENAC 75 MG Oral Tab EC    LOV:8/16/2022 Adali Salinas MD   Due back to clinic per last office note:  6-8 weeks  NOV: Visit date not found      [place]: 6/22/22 #180    Urine drug screen (if applicable): None  Pain contract: None    LOV plan (if weaning or changing medications): 4) Continue Diclofenac and Tylenol as needed

## 2022-09-27 ENCOUNTER — PATIENT MESSAGE (OUTPATIENT)
Dept: ORTHOPEDICS CLINIC | Facility: CLINIC | Age: 77
End: 2022-09-27

## 2022-09-27 NOTE — TELEPHONE ENCOUNTER
Called and spoke with Mady. Informed her that they were not sure of surgery, therefore at [address] [place] [address] no surgical form was filled out. I have it for Dr Den Park to fill out, we will be in touch next week with surgery dates that are available. Mady understood and had no further questions.

## 2022-09-28 ENCOUNTER — OFFICE VISIT (OUTPATIENT)
Dept: PHYSICAL THERAPY | Facility: HOSPITAL | Age: 77
End: 2022-09-28
Attending: PHYSICAL MEDICINE & REHABILITATION
Payer: MEDICARE

## 2022-09-28 ENCOUNTER — TELEPHONE (OUTPATIENT)
Dept: PHYSICAL THERAPY | Facility: HOSPITAL | Age: 77
End: 2022-09-28

## 2022-09-28 PROCEDURE — 97140 MANUAL THERAPY 1/> REGIONS: CPT

## 2022-09-28 PROCEDURE — 97110 THERAPEUTIC EXERCISES: CPT

## 2022-09-28 RX ORDER — DICLOFENAC SODIUM 75 MG/1
TABLET, DELAYED RELEASE ORAL
Qty: 180 TABLET | Refills: 0 | Status: SHIPPED | OUTPATIENT
Start: 2022-09-28

## 2022-09-28 NOTE — PROGRESS NOTES
Diagnosis:   Bilateral tibial pain (M89.8X6)  Medial tibial stress syndrome, unspecified laterality, subsequent encounter (S86.329D)  Pes anserine bursitis (M70.50)  Primary osteoarthritis of left knee (M17.12)  Primary osteoarthritis of right knee (M17.11)  Patellofemoral pain syndrome, unspecified laterality (Y37.0V1)      Referring Provider: Alejandro Moses  Date of Evaluation:    9/21/2022    Precautions:  None Next MD visit:   none scheduled  Date of Surgery: n/a   Insurance Primary/Secondary: MEDICARE / [employer]     # Auth Visits: 10            Subjective: Patient report that woke up with so much pain this morning and continue feel pain. Pain: 8/10 R anterior and medial knee pain      Objective: Need      AROM: (* denotes performed with pain)  Knee Foot/Ankle   Flexion: R 128 pain improved to 135; L 135  Extension: R 0 pain improved to 4 deg pain-free; L 3    DF: R 0; L 0  PF: R WNL; L WNL     Accessory motion: anterior and medial tibial glide restricted R. Assessment: Responding well to today's session, c/o right medial knee pain with exercises, post session decreased pain. Goals: (to be met in 10 visits)  Restore full knee AROM pain-free. Negotiate stairs reciprocally max 3/10 pain to be able to get around her house without difficulty. Up to 15 min walking to be able to go grocery shopping without difficulty max 3/10 pain  Pt will be able tolerate standing up to 15 min to be able to perform chores at home and run errands with max 3/10 pain. Be able to transition from STS without difficulty and max 3/10 pain to get up from chair or get out of car. [place] with HEP. Plan: Continue manual therapy to address jt restrictions, progress R knee ROM and functional RLE strengthening. Date: 9/23/2022  TX#: 2/10 Date:  9/28/2022               TX#: 3/10 Date:                 TX#: 4/ Date:                 TX#: 5/ Date:    Tx#: 6/   MT  -Posterior femoral glide R gr 3  -anterior tibial glide R gr 3  -R fibular head posterior glide gr 3  -R tibial posterior glide with IR gr 3 MT: x10 min  -Posterior femoral glide R gr 3  -R tibial posterior glide with IR gr 3  -Patella mobilization all direction R      EX  -SLR 2x10 RLE  -bridging 3x10  -Shuttle knee unloading x5 min 25 lbs  -DL HR 2x10  -LAQ x20 hold 5 sec  -PROM R knee flexion and ext with OP EX: x35 min  -PROM R knee flexion and ext with OP  -QS 10x5 sec hold   -SLR 2x10 RLE  -Heel slide x20  -Bridging 3x10  -S/L clam 2x10  -DL HR 2x10  -LAQ x20 hold 5 sec  -Shuttle knee unloading x5 min 30 lbs          HEP - added bridging, SLR, and LAQ                Charges: MT, EX 2       Total Timed Treatment: MT 15 min, EX 25 min  Total Treatment Time: 42 min

## 2022-09-29 ENCOUNTER — PATIENT MESSAGE (OUTPATIENT)
Dept: ORTHOPEDICS CLINIC | Facility: CLINIC | Age: 77
End: 2022-09-29

## 2022-09-29 NOTE — TELEPHONE ENCOUNTER
Sent patient's daughter a SIPphone message. I have requested surgery dates available. As soon as we hear back, we will be calling her.

## 2022-09-30 ENCOUNTER — TELEPHONE (OUTPATIENT)
Dept: FAMILY MEDICINE CLINIC | Facility: CLINIC | Age: 77
End: 2022-09-30

## 2022-09-30 ENCOUNTER — TELEPHONE (OUTPATIENT)
Dept: ORTHOPEDICS CLINIC | Facility: CLINIC | Age: 77
End: 2022-09-30

## 2022-09-30 ENCOUNTER — APPOINTMENT (OUTPATIENT)
Dept: PHYSICAL THERAPY | Facility: HOSPITAL | Age: 77
End: 2022-09-30
Attending: PHYSICAL MEDICINE & REHABILITATION
Payer: MEDICARE

## 2022-09-30 DIAGNOSIS — R30.0 DYSURIA: ICD-10-CM

## 2022-09-30 DIAGNOSIS — T14.8XXA BRUISING: ICD-10-CM

## 2022-09-30 DIAGNOSIS — S83.231A COMPLEX TEAR OF MEDIAL MENISCUS OF RIGHT KNEE AS CURRENT INJURY, INITIAL ENCOUNTER: Primary | ICD-10-CM

## 2022-09-30 DIAGNOSIS — Z01.812 ENCOUNTER FOR PREPROCEDURAL LABORATORY EXAMINATION: Primary | ICD-10-CM

## 2022-09-30 NOTE — TELEPHONE ENCOUNTER
Type of surgery:  Right knee arthroscopy, partial medial meniscectomy, debridement  Date: 10/17/22  Location: [facility]  Medical Clearance:      *Medical: Yes      *Dental: No      *Other:  Prior Authorization Status: Pending  Workers Comp:  [place]:  [place]: Yes  POV: 10/27/22

## 2022-09-30 NOTE — TELEPHONE ENCOUNTER
Verified name and  of patient. Daughter of patient (on MARTIN) calling to state that patient has surgery scheduled with Dr. Kalyani Escobar on 10/03/22. She states she spoke with Dr. Arpita Alexander about the surgery at last office visit on 22 but they were not aware of the actual date until today. Daughter of patient is asking if Dr. Arpita Alexander can place orders for surgical clearance testing to have completed as soon as possible so that patient can be cleared for surgery for Monday 10/03/22. Please advise and thank you.

## 2022-09-30 NOTE — TELEPHONE ENCOUNTER
Spoke with pt's daughter per MARTIN, LITZY verified  She wants to f/u on below message, she stated Dr Jackelin Cortez RN will be leaving at noon and hoping to get a reply from PCP. pls advise, thanks in advance.          Future Appointments   Date Time Provider Louie Velarde   [facility]   10/6/2022  5:45 PM Jn Downs [facility]   10/11/2022 11:30 AM Jn Downs [facility]   10/13/2022  1:45 PM Jn Downs [facility]

## 2022-09-30 NOTE — TELEPHONE ENCOUNTER
Talked to patient via language line asking her daughter to call back today before 4:30PM to tell message below and she understood,

## 2022-09-30 NOTE — TELEPHONE ENCOUNTER
Placing orders right now. Patient to follow-up with me tomorrow, can double book her. To get blood work and EKG &CXR done today. 1. Encounter for preprocedural laboratory examination    - CBC WITH DIFFERENTIAL WITH PLATELET; Future  - COMP METABOLIC PANEL (14); Future  - EKG 12-LEAD; Future  - PROTHROMBIN TIME (PT); Future  - PTT, ACTIVATED; Future  - URINALYSIS, ROUTINE; Future  - URINE CULTURE, ROUTINE; Future  - XR CHEST PA + LAT CHEST (CPT=71046);  Future

## 2022-09-30 NOTE — TELEPHONE ENCOUNTER
CSS=please call and assist,see DR Talbot's note below and then transfer to triage,thanks       Future Appointments   Date Time Provider Louie Velarde   10/3/2022  2:00 PM Stephany Coffey [facility]   10/6/2022  5:45 PM Stephany Coffey [facility]   10/11/2022 11:30 AM Stephany Coffey [facility]   10/13/2022  1:45 PM Stephany Coffey [facility]   10/27/2022  8:30 AM Lee Ann Decker MD [facility]

## 2022-10-01 ENCOUNTER — LAB ENCOUNTER (OUTPATIENT)
Dept: LAB | Age: 77
End: 2022-10-01
Attending: TECHNICIAN
Payer: MEDICARE

## 2022-10-01 DIAGNOSIS — T14.8XXA BRUISING: ICD-10-CM

## 2022-10-01 DIAGNOSIS — R30.0 DYSURIA: ICD-10-CM

## 2022-10-01 DIAGNOSIS — Z01.812 ENCOUNTER FOR PREPROCEDURAL LABORATORY EXAMINATION: ICD-10-CM

## 2022-10-01 LAB
ALBUMIN SERPL-MCNC: 3.7 G/DL (ref 3.4–5)
ALBUMIN/GLOB SERPL: 1 {RATIO} (ref 1–2)
ALP LIVER SERPL-CCNC: 96 U/L
ALT SERPL-CCNC: 23 U/L
ANION GAP SERPL CALC-SCNC: 3 MMOL/L (ref 0–18)
APTT PPP: 25.4 SECONDS (ref 23.3–35.6)
AST SERPL-CCNC: 18 U/L (ref 15–37)
BASOPHILS # BLD AUTO: 0.03 X10(3) UL (ref 0–0.2)
BASOPHILS NFR BLD AUTO: 0.6 %
BILIRUB SERPL-MCNC: 0.4 MG/DL (ref 0.1–2)
BILIRUB UR QL: NEGATIVE
BUN BLD-MCNC: 26 MG/DL (ref 7–18)
BUN/CREAT SERPL: 33.8 (ref 10–20)
CALCIUM BLD-MCNC: 9.4 MG/DL (ref 8.5–10.1)
CHLORIDE SERPL-SCNC: 111 MMOL/L (ref 98–112)
CLARITY UR: CLEAR
CO2 SERPL-SCNC: 27 MMOL/L (ref 21–32)
COLOR UR: YELLOW
CREAT BLD-MCNC: 0.77 MG/DL
DEPRECATED RDW RBC AUTO: 46.8 FL (ref 35.1–46.3)
EOSINOPHIL # BLD AUTO: 0.07 X10(3) UL (ref 0–0.7)
EOSINOPHIL NFR BLD AUTO: 1.3 %
ERYTHROCYTE [DISTWIDTH] IN BLOOD BY AUTOMATED COUNT: 13.1 % (ref 11–15)
FASTING STATUS PATIENT QL REPORTED: NO
GFR SERPLBLD BASED ON 1.73 SQ M-ARVRAT: 80 ML/MIN/1.73M2 (ref 60–?)
GLOBULIN PLAS-MCNC: 3.6 G/DL (ref 2.8–4.4)
GLUCOSE BLD-MCNC: 81 MG/DL (ref 70–99)
GLUCOSE UR-MCNC: NEGATIVE MG/DL
HCT VFR BLD AUTO: 37.7 %
HGB BLD-MCNC: 12 G/DL
HGB UR QL STRIP.AUTO: NEGATIVE
IMM GRANULOCYTES # BLD AUTO: 0.02 X10(3) UL (ref 0–1)
IMM GRANULOCYTES NFR BLD: 0.4 %
INR BLD: 0.97 (ref 0.85–1.16)
KETONES UR-MCNC: NEGATIVE MG/DL
LEUKOCYTE ESTERASE UR QL STRIP.AUTO: NEGATIVE
LYMPHOCYTES # BLD AUTO: 1.38 X10(3) UL (ref 1–4)
LYMPHOCYTES NFR BLD AUTO: 26.2 %
MCH RBC QN AUTO: 31.3 PG (ref 26–34)
MCHC RBC AUTO-ENTMCNC: 31.8 G/DL (ref 31–37)
MCV RBC AUTO: 98.2 FL
MONOCYTES # BLD AUTO: 0.48 X10(3) UL (ref 0.1–1)
MONOCYTES NFR BLD AUTO: 9.1 %
NEUTROPHILS # BLD AUTO: 3.28 X10 (3) UL (ref 1.5–7.7)
NEUTROPHILS # BLD AUTO: 3.28 X10(3) UL (ref 1.5–7.7)
NEUTROPHILS NFR BLD AUTO: 62.4 %
NITRITE UR QL STRIP.AUTO: NEGATIVE
OSMOLALITY SERPL CALC.SUM OF ELEC: 296 MOSM/KG (ref 275–295)
PH UR: 6 [PH] (ref 5–8)
PLATELET # BLD AUTO: 133 10(3)UL (ref 150–450)
POTASSIUM SERPL-SCNC: 4.9 MMOL/L (ref 3.5–5.1)
PROT SERPL-MCNC: 7.3 G/DL (ref 6.4–8.2)
PROT UR-MCNC: NEGATIVE MG/DL
PROTHROMBIN TIME: 12.8 SECONDS (ref 11.6–14.8)
RBC # BLD AUTO: 3.84 X10(6)UL
SODIUM SERPL-SCNC: 141 MMOL/L (ref 136–145)
SP GR UR STRIP: 1.02 (ref 1–1.03)
UROBILINOGEN UR STRIP-ACNC: <2
VIT C UR-MCNC: 20 MG/DL
WBC # BLD AUTO: 5.3 X10(3) UL (ref 4–11)

## 2022-10-01 PROCEDURE — 85610 PROTHROMBIN TIME: CPT

## 2022-10-01 PROCEDURE — 81001 URINALYSIS AUTO W/SCOPE: CPT

## 2022-10-01 PROCEDURE — 85730 THROMBOPLASTIN TIME PARTIAL: CPT

## 2022-10-01 PROCEDURE — 36415 COLL VENOUS BLD VENIPUNCTURE: CPT

## 2022-10-01 PROCEDURE — 87086 URINE CULTURE/COLONY COUNT: CPT

## 2022-10-01 PROCEDURE — 85025 COMPLETE CBC W/AUTO DIFF WBC: CPT

## 2022-10-01 PROCEDURE — 80053 COMPREHEN METABOLIC PANEL: CPT

## 2022-10-03 ENCOUNTER — OFFICE VISIT (OUTPATIENT)
Dept: PHYSICAL THERAPY | Facility: HOSPITAL | Age: 77
End: 2022-10-03
Attending: PHYSICAL MEDICINE & REHABILITATION
Payer: MEDICARE

## 2022-10-03 PROCEDURE — 97110 THERAPEUTIC EXERCISES: CPT

## 2022-10-03 PROCEDURE — 97140 MANUAL THERAPY 1/> REGIONS: CPT

## 2022-10-03 NOTE — PROGRESS NOTES
Discharge Summary  Pt has attended 4 visits in Physical Therapy    Diagnosis:   Bilateral tibial pain (M89.8X6)  Medial tibial stress syndrome, unspecified laterality, subsequent encounter (S86.899D)  Pes anserine bursitis (M70.50)  Primary osteoarthritis of left knee (M17.12)  Primary osteoarthritis of right knee (M17.11)  Patellofemoral pain syndrome, unspecified laterality (C06.9U3)      Referring Provider: Kaz Skinner  Date of Evaluation:    9/21/2022    Precautions:  None Next MD visit:   none scheduled  Date of Surgery: n/a     Insurance Primary/Secondary: Dunia Wright / Lizzeth Lopez     # Auth Visits: 10            Subjective: Per pt's dtr (present to translate), R knee is still bothering quite a bit with prolonged standing, walking and stair negotiation. Pt opted for R knee scope 10/16 to be done by Dr. Ivory Quinones    Pain: 8/10 R anterior and medial knee pain      Objective: Need      AROM: (* denotes performed with pain)  Knee Foot/Ankle   Flexion: R 135 pain-free; L 135  Extension: R 3 pain-free; L 3    DF: R 0; L 0  PF: R WNL; L WNL     Accessory motion: anterior and medial tibial glide restricted R. Assessment: Pt will be discharged from therapy per subjective to pursue sx to address lingering R knee pain with WB activities. Goals: (to be met in 10 visits)  Restore full knee AROM pain-free. (MET)  Negotiate stairs reciprocally max 3/10 pain to be able to get around her house without difficulty. (NOT MET)   Up to 15 min walking to be able to go grocery shopping without difficulty max 3/10 pain. (NOT MET)   Pt will be able tolerate standing up to 15 min to be able to perform chores at home and run errands with max 3/10 pain. (NOT MET)   Be able to transition from STS without difficulty and max 3/10 pain to get up from chair or get out of car. (NOT MET)   [place] with HEP. (MET)      Plan: DC due to pt scheduled for R knee scope 10/16 and will return for post-op rehab.     Thank you for your referral. Please co-sign or sign and return this letter via fax as soon as possible to [phone]. If you have any questions, please contact me at Dept: [phone]. Sincerely,  Electronically signed by therapist: Aamir Adams PT    [de-identified] certification required: Yes  I certify the need for these services furnished under this plan of treatment and while under my care. X___________________________________________________ Date____________________    Certification From: 09/7/7463  To:1/1/2023      Date: 10/3/2022          TX#: 4/10   MT  -Posterior femoral glide R gr 3  -anterior tibial glide R gr 3  -R fibular head posterior glide gr 3  -R tibial posterior glide with IR gr 3   EX  -SLR 2x10 RLE  -bridging 3x10 with RTB  -Shuttle knee unloading x5 min 15 lbs  -S/L clam 2x10  -LAQ x20 hold 5 sec  -PROM R knee flexion and ext with OP  -Test and measures.   -HEP reviewed for exs above           Charges: MT, EX 2       Total Timed Treatment: MT 15 min, EX 25 min  Total Treatment Time: 42 min

## 2022-10-03 NOTE — TELEPHONE ENCOUNTER
Talked to Leobardo Pike daughter of patient told her message below understood but was so surprise but she will call back.

## 2022-10-04 ENCOUNTER — HOSPITAL ENCOUNTER (OUTPATIENT)
Dept: GENERAL RADIOLOGY | Age: 77
Discharge: HOME | End: 2022-10-04
Attending: FAMILY MEDICINE
Payer: MEDICARE

## 2022-10-04 ENCOUNTER — OFFICE VISIT (OUTPATIENT)
Dept: FAMILY MEDICINE CLINIC | Facility: CLINIC | Age: 77
End: 2022-10-04

## 2022-10-04 ENCOUNTER — EKG ENCOUNTER (OUTPATIENT)
Dept: LAB | Age: 77
End: 2022-10-04
Attending: FAMILY MEDICINE
Payer: MEDICARE

## 2022-10-04 VITALS
HEART RATE: 67 BPM | WEIGHT: 132 LBS | BODY MASS INDEX: 25.91 KG/M2 | SYSTOLIC BLOOD PRESSURE: 131 MMHG | TEMPERATURE: 97 F | HEIGHT: 60 IN | DIASTOLIC BLOOD PRESSURE: 74 MMHG

## 2022-10-04 DIAGNOSIS — M47.816 FACET SYNDROME, LUMBAR: ICD-10-CM

## 2022-10-04 DIAGNOSIS — M25.512 CHRONIC PAIN OF BOTH SHOULDERS: ICD-10-CM

## 2022-10-04 DIAGNOSIS — Z01.812 ENCOUNTER FOR PREPROCEDURAL LABORATORY EXAMINATION: ICD-10-CM

## 2022-10-04 DIAGNOSIS — M51.37 DDD (DEGENERATIVE DISC DISEASE), LUMBOSACRAL: ICD-10-CM

## 2022-10-04 DIAGNOSIS — M51.36 BULGE OF LUMBAR DISC WITHOUT MYELOPATHY: ICD-10-CM

## 2022-10-04 DIAGNOSIS — M54.59 LUMBAR TRIGGER POINT SYNDROME: ICD-10-CM

## 2022-10-04 DIAGNOSIS — M67.919 TENDINOPATHY OF ROTATOR CUFF, UNSPECIFIED LATERALITY: ICD-10-CM

## 2022-10-04 DIAGNOSIS — M79.10 MYALGIA: ICD-10-CM

## 2022-10-04 DIAGNOSIS — M48.061 LUMBAR FORAMINAL STENOSIS: ICD-10-CM

## 2022-10-04 DIAGNOSIS — M99.9 BIOMECHANICAL LESION: ICD-10-CM

## 2022-10-04 DIAGNOSIS — M47.816 LUMBAR SPONDYLOSIS: ICD-10-CM

## 2022-10-04 DIAGNOSIS — M25.511 CHRONIC PAIN OF BOTH SHOULDERS: ICD-10-CM

## 2022-10-04 DIAGNOSIS — M54.59 MECHANICAL LOW BACK PAIN: ICD-10-CM

## 2022-10-04 DIAGNOSIS — Z01.812 ENCOUNTER FOR PREPROCEDURAL LABORATORY EXAMINATION: Primary | ICD-10-CM

## 2022-10-04 DIAGNOSIS — G89.29 CHRONIC PAIN OF BOTH SHOULDERS: ICD-10-CM

## 2022-10-04 PROCEDURE — 93010 ELECTROCARDIOGRAM REPORT: CPT | Performed by: FAMILY MEDICINE

## 2022-10-04 PROCEDURE — 71046 X-RAY EXAM CHEST 2 VIEWS: CPT | Performed by: FAMILY MEDICINE

## 2022-10-04 PROCEDURE — 93005 ELECTROCARDIOGRAM TRACING: CPT

## 2022-10-04 NOTE — TELEPHONE ENCOUNTER
Refill Request    Medication request: PREGABALIN 100 MG Oral Cap. TAKE 1 CAPSULE(100 MG) BY MOUTH TWICE DAILY. Ramonita Hutton MD   Due back to clinic per last office note: RTC post injections - date not specified in LOV note. NOV: Visit date not found      [place]: 08/30/2022 #60    Urine drug screen (if applicable): n/a  Pain contract: none    LOV plan (if weaning or changing medications): none mentioned         Medication request: SERTRALINE 100 MG Oral Tab. TAKE 1 TABLET(100 MG) BY MOUTH EVERY NIGHT.       [place]: 08/30/2022 #30    LOV plan (if weaning or changing medications): none mentioned

## 2022-10-06 ENCOUNTER — APPOINTMENT (OUTPATIENT)
Dept: PHYSICAL THERAPY | Facility: HOSPITAL | Age: 77
End: 2022-10-06
Attending: PHYSICAL MEDICINE & REHABILITATION
Payer: MEDICARE

## 2022-10-06 RX ORDER — SERTRALINE HYDROCHLORIDE 100 MG/1
TABLET, FILM COATED ORAL
Qty: 30 TABLET | Refills: 0 | Status: SHIPPED | OUTPATIENT
Start: 2022-10-06

## 2022-10-06 RX ORDER — PREGABALIN 100 MG/1
CAPSULE ORAL
Qty: 60 CAPSULE | Refills: 0 | Status: SHIPPED | OUTPATIENT
Start: 2022-10-06

## 2022-10-07 DIAGNOSIS — S83.231A COMPLEX TEAR OF MEDIAL MENISCUS OF RIGHT KNEE AS CURRENT INJURY, INITIAL ENCOUNTER: Primary | ICD-10-CM

## 2022-10-11 ENCOUNTER — APPOINTMENT (OUTPATIENT)
Dept: PHYSICAL THERAPY | Facility: HOSPITAL | Age: 77
End: 2022-10-11
Attending: PHYSICAL MEDICINE & REHABILITATION
Payer: MEDICARE

## 2022-10-13 ENCOUNTER — APPOINTMENT (OUTPATIENT)
Dept: PHYSICAL THERAPY | Facility: HOSPITAL | Age: 77
End: 2022-10-13
Attending: PHYSICAL MEDICINE & REHABILITATION
Payer: MEDICARE

## 2022-10-13 DIAGNOSIS — M25.561 ACUTE PAIN OF RIGHT KNEE: ICD-10-CM

## 2022-10-13 RX ORDER — TRAMADOL HYDROCHLORIDE 50 MG/1
TABLET ORAL
Qty: 30 TABLET | Refills: 0 | Status: SHIPPED | OUTPATIENT
Start: 2022-10-13 | End: 2022-10-17

## 2022-10-14 ENCOUNTER — LAB ENCOUNTER (OUTPATIENT)
Dept: LAB | Facility: HOSPITAL | Age: 77
End: 2022-10-14
Attending: ORTHOPAEDIC SURGERY
Payer: MEDICARE

## 2022-10-14 DIAGNOSIS — Z01.818 PREOP TESTING: ICD-10-CM

## 2022-10-15 LAB — SARS-COV-2 RNA RESP QL NAA+PROBE: NOT DETECTED

## 2022-10-17 ENCOUNTER — ANESTHESIA (OUTPATIENT)
Dept: SURGERY | Facility: HOSPITAL | Age: 77
End: 2022-10-17
Payer: MEDICARE

## 2022-10-17 ENCOUNTER — ANESTHESIA EVENT (OUTPATIENT)
Dept: SURGERY | Facility: HOSPITAL | Age: 77
End: 2022-10-17
Payer: MEDICARE

## 2022-10-17 ENCOUNTER — HOSPITAL ENCOUNTER (OUTPATIENT)
Facility: HOSPITAL | Age: 77
Setting detail: HOSPITAL OUTPATIENT SURGERY
Discharge: HOME | End: 2022-10-17
Attending: ORTHOPAEDIC SURGERY | Admitting: ORTHOPAEDIC SURGERY
Payer: MEDICARE

## 2022-10-17 VITALS
DIASTOLIC BLOOD PRESSURE: 57 MMHG | TEMPERATURE: 97 F | WEIGHT: 130 LBS | BODY MASS INDEX: 26.21 KG/M2 | SYSTOLIC BLOOD PRESSURE: 147 MMHG | RESPIRATION RATE: 18 BRPM | HEART RATE: 58 BPM | HEIGHT: 59 IN | OXYGEN SATURATION: 99 %

## 2022-10-17 DIAGNOSIS — S83.231A COMPLEX TEAR OF MEDIAL MENISCUS OF RIGHT KNEE AS CURRENT INJURY, INITIAL ENCOUNTER: ICD-10-CM

## 2022-10-17 DIAGNOSIS — Z01.818 PREOP TESTING: Primary | ICD-10-CM

## 2022-10-17 PROCEDURE — 0SBC4ZZ EXCISION OF RIGHT KNEE JOINT, PERCUTANEOUS ENDOSCOPIC APPROACH: ICD-10-PCS | Performed by: ORTHOPAEDIC SURGERY

## 2022-10-17 RX ORDER — ACETAMINOPHEN 500 MG
1000 TABLET ORAL ONCE
Status: COMPLETED | OUTPATIENT
Start: 2022-10-17 | End: 2022-10-17

## 2022-10-17 RX ORDER — MORPHINE SULFATE 10 MG/ML
6 INJECTION, SOLUTION INTRAMUSCULAR; INTRAVENOUS EVERY 10 MIN PRN
Status: DISCONTINUED | OUTPATIENT
Start: 2022-10-17 | End: 2022-10-17

## 2022-10-17 RX ORDER — MAGNESIUM HYDROXIDE 1200 MG/15ML
LIQUID ORAL CONTINUOUS PRN
Status: COMPLETED | OUTPATIENT
Start: 2022-10-17 | End: 2022-10-17

## 2022-10-17 RX ORDER — CEFAZOLIN SODIUM/WATER 2 G/20 ML
2 SYRINGE (ML) INTRAVENOUS
Status: COMPLETED | OUTPATIENT
Start: 2022-10-17 | End: 2022-10-17

## 2022-10-17 RX ORDER — BUPIVACAINE HYDROCHLORIDE AND EPINEPHRINE 2.5; 5 MG/ML; UG/ML
INJECTION, SOLUTION INFILTRATION; PERINEURAL AS NEEDED
Status: DISCONTINUED | OUTPATIENT
Start: 2022-10-17 | End: 2022-10-17 | Stop reason: HOSPADM

## 2022-10-17 RX ORDER — LIDOCAINE HYDROCHLORIDE 10 MG/ML
INJECTION, SOLUTION EPIDURAL; INFILTRATION; INTRACAUDAL; PERINEURAL AS NEEDED
Status: DISCONTINUED | OUTPATIENT
Start: 2022-10-17 | End: 2022-10-17 | Stop reason: SURG

## 2022-10-17 RX ORDER — HYDROMORPHONE HYDROCHLORIDE 1 MG/ML
0.4 INJECTION, SOLUTION INTRAMUSCULAR; INTRAVENOUS; SUBCUTANEOUS EVERY 5 MIN PRN
Status: DISCONTINUED | OUTPATIENT
Start: 2022-10-17 | End: 2022-10-17

## 2022-10-17 RX ORDER — MORPHINE SULFATE 4 MG/ML
4 INJECTION, SOLUTION INTRAMUSCULAR; INTRAVENOUS EVERY 10 MIN PRN
Status: DISCONTINUED | OUTPATIENT
Start: 2022-10-17 | End: 2022-10-17

## 2022-10-17 RX ORDER — HYDROCODONE BITARTRATE AND ACETAMINOPHEN 5; 325 MG/1; MG/1
1 TABLET ORAL EVERY 6 HOURS PRN
Qty: 5 TABLET | Refills: 0 | Status: SHIPPED | OUTPATIENT
Start: 2022-10-17

## 2022-10-17 RX ORDER — SODIUM CHLORIDE, SODIUM LACTATE, POTASSIUM CHLORIDE, CALCIUM CHLORIDE 600; 310; 30; 20 MG/100ML; MG/100ML; MG/100ML; MG/100ML
INJECTION, SOLUTION INTRAVENOUS CONTINUOUS
Status: DISCONTINUED | OUTPATIENT
Start: 2022-10-17 | End: 2022-10-17

## 2022-10-17 RX ORDER — MORPHINE SULFATE 4 MG/ML
2 INJECTION, SOLUTION INTRAMUSCULAR; INTRAVENOUS EVERY 10 MIN PRN
Status: DISCONTINUED | OUTPATIENT
Start: 2022-10-17 | End: 2022-10-17

## 2022-10-17 RX ORDER — ONDANSETRON 2 MG/ML
4 INJECTION INTRAMUSCULAR; INTRAVENOUS EVERY 6 HOURS PRN
Status: DISCONTINUED | OUTPATIENT
Start: 2022-10-17 | End: 2022-10-17

## 2022-10-17 RX ORDER — HYDROMORPHONE HYDROCHLORIDE 1 MG/ML
0.6 INJECTION, SOLUTION INTRAMUSCULAR; INTRAVENOUS; SUBCUTANEOUS EVERY 5 MIN PRN
Status: DISCONTINUED | OUTPATIENT
Start: 2022-10-17 | End: 2022-10-17

## 2022-10-17 RX ORDER — DEXAMETHASONE SODIUM PHOSPHATE 4 MG/ML
VIAL (ML) INJECTION AS NEEDED
Status: DISCONTINUED | OUTPATIENT
Start: 2022-10-17 | End: 2022-10-17 | Stop reason: SURG

## 2022-10-17 RX ORDER — HYDROCODONE BITARTRATE AND ACETAMINOPHEN 5; 325 MG/1; MG/1
1 TABLET ORAL EVERY 6 HOURS PRN
Status: DISCONTINUED | OUTPATIENT
Start: 2022-10-17 | End: 2022-10-17

## 2022-10-17 RX ORDER — NALOXONE HYDROCHLORIDE 0.4 MG/ML
80 INJECTION, SOLUTION INTRAMUSCULAR; INTRAVENOUS; SUBCUTANEOUS AS NEEDED
Status: DISCONTINUED | OUTPATIENT
Start: 2022-10-17 | End: 2022-10-17

## 2022-10-17 RX ORDER — METOCLOPRAMIDE HYDROCHLORIDE 5 MG/ML
10 INJECTION INTRAMUSCULAR; INTRAVENOUS EVERY 8 HOURS PRN
Status: DISCONTINUED | OUTPATIENT
Start: 2022-10-17 | End: 2022-10-17

## 2022-10-17 RX ORDER — HYDROMORPHONE HYDROCHLORIDE 1 MG/ML
0.2 INJECTION, SOLUTION INTRAMUSCULAR; INTRAVENOUS; SUBCUTANEOUS EVERY 5 MIN PRN
Status: DISCONTINUED | OUTPATIENT
Start: 2022-10-17 | End: 2022-10-17

## 2022-10-17 RX ADMIN — SODIUM CHLORIDE, SODIUM LACTATE, POTASSIUM CHLORIDE, CALCIUM CHLORIDE: 600; 310; 30; 20 INJECTION, SOLUTION INTRAVENOUS at 07:38:00

## 2022-10-17 RX ADMIN — LIDOCAINE HYDROCHLORIDE 50 MG: 10 INJECTION, SOLUTION EPIDURAL; INFILTRATION; INTRACAUDAL; PERINEURAL at 07:44:00

## 2022-10-17 RX ADMIN — CEFAZOLIN SODIUM/WATER 2 G: 2 G/20 ML SYRINGE (ML) INTRAVENOUS at 07:48:00

## 2022-10-17 RX ADMIN — DEXAMETHASONE SODIUM PHOSPHATE 4 MG: 4 MG/ML VIAL (ML) INJECTION at 07:44:00

## 2022-10-17 RX ADMIN — SODIUM CHLORIDE, SODIUM LACTATE, POTASSIUM CHLORIDE, CALCIUM CHLORIDE: 600; 310; 30; 20 INJECTION, SOLUTION INTRAVENOUS at 08:32:00

## 2022-10-17 NOTE — ANESTHESIA POSTPROCEDURE EVALUATION
Patient: Maria E Arthur    Procedure Summary     Date: 10/17/22 Room / Location: [address] [place] [address] MAIN OR 16 / [address] [place] [address] MAIN OR    Anesthesia Start: 8645 Anesthesia Stop:     Procedure: Right knee arthroscopy, partial medial meniscectomy,condyle debridement . (Right Knee) Diagnosis:       Complex tear of medial meniscus of right knee as current injury, initial encounter      (Complex tear of medial meniscus of right knee as current injury, initial encounter [H44.142Z])    Surgeons: Yuki Wilde MD Anesthesiologist: Lynette Crawford MD    Anesthesia Type: general ASA Status: 2          Anesthesia Type: No value filed. Vitals Value Taken Time   /66 10/17/22 0844   Temp 97.6 10/17/22 0845   Pulse 77 10/17/22 0844   Resp 0 10/17/22 0844   SpO2 99 % 10/17/22 0844   Vitals shown include unvalidated device data.     [address] [place] [address] AN Post Evaluation:   Patient Evaluated in PACU  Patient Participation: complete - patient participated  Level of Consciousness: awake  Pain Score: 0  Pain Management: adequate  Airway Patency:patent  Dental exam unchanged from preop  Yes    Cardiovascular Status: acceptable  Respiratory Status: acceptable  Postoperative Hydration acceptable      Waylon Velazquez MD  10/17/2022 8:45 AM

## 2022-10-17 NOTE — OPERATIVE REPORT
Operative Note    Patient Name: Nile Tamez [facility]    Preoperative Diagnosis: Complex tear of medial meniscus of right knee as current injury, initial encounter [S83.231A]    Postoperative Diagnosis: Complex tear of medial meniscus of right knee as current injury, initial encounter [S83.231A], chondral lesion MFC, patella    Primary Surgeon: Aggie Sinha MD     Assistant: Jm Lathe    Procedures: R knee arthroscopy, PMM, chondral debridement MFC/patella    Surgical Findings: above    Anesthesia: General    Complications: none    Specimen: none    Drains: none    Condition: stable to RR    Estimated Blood Loss: Jose Francisco Dominique MD

## 2022-10-17 NOTE — OPERATIVE REPORT
[facility]    PATIENT'S NAME: NIKHIL ALFRED   ATTENDING PHYSICIAN: Darwin Stout MD   OPERATING PHYSICIAN: Darwin Stout MD   PATIENT ACCOUNT#:   [id number]    LOCATION:  [facility] 11 [facility] 10  MEDICAL RECORD #:   [id number]       YOB: 1945  ADMISSION DATE:       10/17/2022      OPERATION DATE:  10/17/2022    OPERATIVE REPORT      PREOPERATIVE DIAGNOSIS:  Right knee medial meniscus tear. POSTOPERATIVE DIAGNOSIS:    1. Right knee medial meniscus tear. 2.   Right knee chondral lesion, medial femoral condyle and patella. PROCEDURE:    1. Right knee arthroscopy. 2.   Arthroscopic partial medial meniscectomy. 3.   Arthroscopic chondral debridement, medial femoral condyle and patella. ASSISTANT:  SHERLY Clay. ANESTHESIA:  General.    COMPLICATIONS:  None. BLOOD LOSS:  10 mL. SPECIMEN:  None. DRAIN:  None. INDICATIONS:  Patient is a 62-year-old female with history of progressive right knee pain localizing to the medial aspect of the knee. She had mechanical sensations and locking sensations which limited her ability to ambulate. Preoperative physical findings and imaging studies were consistent with a degenerative-type tear of the medial meniscus. After discussion with the patient the risks and benefits of proceeding with operative treatment of the right knee, she wished to proceed with the surgery. She failed conservative treatments, including anti-inflammatory medications, therapeutic exercise, intra-articular injections, and activity modifications. FINDINGS:  Examination under anesthesia:  The patient's knee was stable to ligamentous exam.  Lachman's sign and posterior drawer were negative. The knee was stable to varus and valgus stress at 30 degrees and at full extension. Arthroscopic findings:    1. Patellofemoral joint.   The undersurface of the patella had diffuse grade 2 degenerative change, primarily over the lateral patellar facet, with fibrillation noted. No full-thickness chondral lesions noted. The femoral trochlea had grade 1 degenerative softening but no discrete lesions. Position of the patella was central.  Tracking was central at 30 degrees of flexion. 2.   Notch. The ACL and PCL were intact, unremarkable. There was some synovial hypertrophy in the intercondylar notch. 3.   Medial compartment. There was diffuse grade 2 degenerative fibrillation of the medial femoral condyle with a discrete grade 3 lesion over the weightbearing surface more laterally. The lesion measured approximately 6 x 6 mm in size. There was minimal delamination of the articular cartilage in the area. Medial tibial plateau had grade 1 degenerative softening but no discrete lesions noted. Medial meniscus had a complex degenerative tear of the posterior horn extending to the body. The tear involved nearly 80% to 90% of the width of the meniscus at the junction of the body and posterior horn. Approximately 60% of the posterior horn was torn; the remaining rim remained intact. The anterior horn was unremarkable with some degenerative change. 4.   Lateral compartment. The lateral femoral condyle and tibial plateau of meniscus had degenerative fibrillation, primarily over the anterior horn of the lateral meniscus, but no discrete full-thickness defects were noted. 5.   Gutters and suprapatellar pouch. Unremarkable. There were no significant loose bodies or plica. There was some synovitis noted, especially in the medial gutter. OPERATIVE TECHNIQUE:  The patient was identified in the preoperative holding area. The appropriate consents were obtained. She was taken to the operating room, placed in a supine position on the operating room table. After adequate general anesthesia was obtained, a tourniquet was placed on the right thigh. The right lower extremity was placed in the arthroscopic leg treadwell.   The left lower extremity was placed in the padded well leg treadwell. All bony prominences were well padded. An SCD device was placed on the left lower extremity. The patient was given preoperative IV antibiotics. The right knee was then prepped and draped in a sterile fashion. The extremity was exsanguinated and the tourniquet was inflated to 250 mmHg. Anterior portals were established. A superolateral outflow cannula was inserted. The camera was inserted through the lateral portal, and a thorough examination of the knee joint was performed. The findings were as stated. Next, using a combination of cutting instruments, motorized shaver, and radiofrequency wand, a partial medial meniscectomy was performed. Approximately 60% to 70% of the posterior horn was resected back to stable meniscal rim. At the junction of the body and the posterior horn, approximately 80% of the meniscus was resected back to a stable meniscal attachment at the capsule. The anterior horn was also debrided of fibrillation. The lateral meniscus was also debrided of fibrillation with a motorized shaver. The fibrillated articular cartilage and delaminated articular cartilage from the medial femoral condyle was debrided with the shaver and radiofrequency wand. The shaver was also used to debride fibrillated articular cartilage from the patella. The knee was suction irrigated. Attention was paid to hemostasis. The instruments were removed. The portals were closed with 4-0 absorbable sutures. Steri-Strips were applied, followed by a sterile dressing. The patient's anesthesia was reversed. She was extubated and taken to the recovery room in stable condition. All sponge and instrument counts were reported as correct. The attending physician, Dr. Milagros Pruitt, was present and performed all critical portions of the procedure. There were no complications. The first assistant was medically necessary for the surgery. Assisted with patient positioning.   He operated the arthroscope during portions of the procedure. He also assisted with hemostasis and wound closure. Without the aid of the assistant, the surgical procedure would not have been possible. Dictated By Claudia Faith.  Etta Campoverde MD  d: 10/17/2022 08:40:16  t: 10/17/2022 17:50:09  Job [id number]  DLO/

## 2022-10-17 NOTE — ANESTHESIA PROCEDURE NOTES
Airway  Date/Time: 10/17/2022 7:42 AM  Urgency: Elective      General Information and Staff    Patient location during procedure: OR  Anesthesiologist: Rose Marie Morton MD  Performed: anesthesiologist     Indications and Patient Condition  Indications for airway management: anesthesia  Spontaneous ventilation: present  Sedation level: deep  Preoxygenated: yes  Patient position: sniffing  Mask difficulty assessment: 1 - vent by mask    Final Airway Details  Final airway type: supraglottic airway      Successful airway: classic  Size 4      Number of attempts at approach: 1

## 2022-10-19 ENCOUNTER — OFFICE VISIT (OUTPATIENT)
Dept: PHYSICAL THERAPY | Facility: HOSPITAL | Age: 77
End: 2022-10-19
Attending: ORTHOPAEDIC SURGERY
Payer: MEDICARE

## 2022-10-19 DIAGNOSIS — S83.231A COMPLEX TEAR OF MEDIAL MENISCUS OF RIGHT KNEE AS CURRENT INJURY, INITIAL ENCOUNTER: ICD-10-CM

## 2022-10-19 PROCEDURE — 97161 PT EVAL LOW COMPLEX 20 MIN: CPT

## 2022-10-19 PROCEDURE — 97110 THERAPEUTIC EXERCISES: CPT

## 2022-10-20 DIAGNOSIS — M47.816 FACET SYNDROME, LUMBAR: ICD-10-CM

## 2022-10-20 DIAGNOSIS — M51.36 BULGE OF LUMBAR DISC WITHOUT MYELOPATHY: ICD-10-CM

## 2022-10-20 DIAGNOSIS — M54.59 LUMBAR TRIGGER POINT SYNDROME: ICD-10-CM

## 2022-10-20 DIAGNOSIS — M51.37 DDD (DEGENERATIVE DISC DISEASE), LUMBOSACRAL: ICD-10-CM

## 2022-10-20 DIAGNOSIS — M25.511 CHRONIC PAIN OF BOTH SHOULDERS: ICD-10-CM

## 2022-10-20 DIAGNOSIS — M47.816 LUMBAR SPONDYLOSIS: ICD-10-CM

## 2022-10-20 DIAGNOSIS — M99.9 BIOMECHANICAL LESION: ICD-10-CM

## 2022-10-20 DIAGNOSIS — G89.29 CHRONIC PAIN OF BOTH SHOULDERS: ICD-10-CM

## 2022-10-20 DIAGNOSIS — M79.10 MYALGIA: ICD-10-CM

## 2022-10-20 DIAGNOSIS — M48.061 LUMBAR FORAMINAL STENOSIS: ICD-10-CM

## 2022-10-20 DIAGNOSIS — M25.512 CHRONIC PAIN OF BOTH SHOULDERS: ICD-10-CM

## 2022-10-20 DIAGNOSIS — M54.59 MECHANICAL LOW BACK PAIN: ICD-10-CM

## 2022-10-20 DIAGNOSIS — M67.919 TENDINOPATHY OF ROTATOR CUFF, UNSPECIFIED LATERALITY: ICD-10-CM

## 2022-10-24 ENCOUNTER — OFFICE VISIT (OUTPATIENT)
Dept: PHYSICAL THERAPY | Facility: HOSPITAL | Age: 77
End: 2022-10-24
Attending: ORTHOPAEDIC SURGERY
Payer: MEDICARE

## 2022-10-24 PROCEDURE — 97110 THERAPEUTIC EXERCISES: CPT

## 2022-10-25 RX ORDER — SERTRALINE HYDROCHLORIDE 100 MG/1
TABLET, FILM COATED ORAL
Qty: 30 TABLET | Refills: 0 | Status: SHIPPED | OUTPATIENT
Start: 2022-11-05

## 2022-10-26 ENCOUNTER — OFFICE VISIT (OUTPATIENT)
Dept: PHYSICAL THERAPY | Facility: HOSPITAL | Age: 77
End: 2022-10-26
Attending: ORTHOPAEDIC SURGERY
Payer: MEDICARE

## 2022-10-26 PROCEDURE — 97110 THERAPEUTIC EXERCISES: CPT

## 2022-10-27 ENCOUNTER — OFFICE VISIT (OUTPATIENT)
Dept: ORTHOPEDICS CLINIC | Facility: CLINIC | Age: 77
End: 2022-10-27
Payer: MEDICARE

## 2022-10-27 DIAGNOSIS — S83.231A COMPLEX TEAR OF MEDIAL MENISCUS OF RIGHT KNEE AS CURRENT INJURY, INITIAL ENCOUNTER: Primary | ICD-10-CM

## 2022-10-27 PROCEDURE — 99024 POSTOP FOLLOW-UP VISIT: CPT | Performed by: ORTHOPAEDIC SURGERY

## 2022-10-31 ENCOUNTER — OFFICE VISIT (OUTPATIENT)
Dept: PHYSICAL THERAPY | Facility: HOSPITAL | Age: 77
End: 2022-10-31
Attending: ORTHOPAEDIC SURGERY
Payer: MEDICARE

## 2022-10-31 PROCEDURE — 97110 THERAPEUTIC EXERCISES: CPT

## 2022-10-31 PROCEDURE — 97140 MANUAL THERAPY 1/> REGIONS: CPT

## 2022-11-02 ENCOUNTER — OFFICE VISIT (OUTPATIENT)
Dept: PHYSICAL THERAPY | Facility: HOSPITAL | Age: 77
End: 2022-11-02
Attending: ORTHOPAEDIC SURGERY
Payer: MEDICARE

## 2022-11-02 PROCEDURE — 97140 MANUAL THERAPY 1/> REGIONS: CPT

## 2022-11-02 PROCEDURE — 97110 THERAPEUTIC EXERCISES: CPT

## 2022-11-03 RX ORDER — NAPROXEN 500 MG/1
TABLET ORAL
Qty: 60 TABLET | Refills: 0 | Status: SHIPPED | OUTPATIENT
Start: 2022-11-03

## 2022-11-03 NOTE — TELEPHONE ENCOUNTER
Refill Request    Medication request: Naproxen 500mg oral cap    LOV:8/16/2022 Elieser Luna MD   RTC: Post injection  NOV: Visit date not found      [place]: 9/2/22 #20    UDS: (if applicable): None  Pain contract: N/A    LOV plan (if weaning or changing medications): None

## 2022-11-07 ENCOUNTER — APPOINTMENT (OUTPATIENT)
Dept: PHYSICAL THERAPY | Facility: HOSPITAL | Age: 77
End: 2022-11-07
Attending: ORTHOPAEDIC SURGERY
Payer: MEDICARE

## 2022-11-08 RX ORDER — PREGABALIN 100 MG/1
CAPSULE ORAL
Qty: 60 CAPSULE | Refills: 0 | Status: SHIPPED | OUTPATIENT
Start: 2022-11-08

## 2022-11-08 NOTE — TELEPHONE ENCOUNTER
Refill Request    Medication request: Pregabalin 100mg oral cap    Irlanda Yang MD   RTC: N/A  NOV: Visit date not found      [place]: 10/6/22 #11    UDS: (if applicable): None  Pain contract: None    LOV plan (if weaning or changing medications): N/A mentioned
room air

## 2022-11-09 ENCOUNTER — OFFICE VISIT (OUTPATIENT)
Dept: PHYSICAL THERAPY | Facility: HOSPITAL | Age: 77
End: 2022-11-09
Attending: ORTHOPAEDIC SURGERY
Payer: MEDICARE

## 2022-11-09 PROCEDURE — 97140 MANUAL THERAPY 1/> REGIONS: CPT

## 2022-11-09 PROCEDURE — 97110 THERAPEUTIC EXERCISES: CPT

## 2022-11-14 ENCOUNTER — OFFICE VISIT (OUTPATIENT)
Dept: PHYSICAL THERAPY | Facility: HOSPITAL | Age: 77
End: 2022-11-14
Attending: ORTHOPAEDIC SURGERY
Payer: MEDICARE

## 2022-11-14 PROCEDURE — 97110 THERAPEUTIC EXERCISES: CPT

## 2022-11-14 PROCEDURE — 97140 MANUAL THERAPY 1/> REGIONS: CPT

## 2022-11-16 ENCOUNTER — OFFICE VISIT (OUTPATIENT)
Dept: PHYSICAL THERAPY | Facility: HOSPITAL | Age: 77
End: 2022-11-16
Attending: ORTHOPAEDIC SURGERY
Payer: MEDICARE

## 2022-11-16 PROCEDURE — 97140 MANUAL THERAPY 1/> REGIONS: CPT

## 2022-11-16 PROCEDURE — 97110 THERAPEUTIC EXERCISES: CPT

## 2022-11-21 ENCOUNTER — OFFICE VISIT (OUTPATIENT)
Dept: PHYSICAL THERAPY | Facility: HOSPITAL | Age: 77
End: 2022-11-21
Attending: ORTHOPAEDIC SURGERY
Payer: MEDICARE

## 2022-11-21 PROCEDURE — 97140 MANUAL THERAPY 1/> REGIONS: CPT

## 2022-11-21 PROCEDURE — 97110 THERAPEUTIC EXERCISES: CPT

## 2022-11-23 ENCOUNTER — OFFICE VISIT (OUTPATIENT)
Dept: PHYSICAL THERAPY | Facility: HOSPITAL | Age: 77
End: 2022-11-23
Attending: ORTHOPAEDIC SURGERY
Payer: MEDICARE

## 2022-11-23 PROCEDURE — 97140 MANUAL THERAPY 1/> REGIONS: CPT

## 2022-11-23 PROCEDURE — 97110 THERAPEUTIC EXERCISES: CPT

## 2022-11-28 ENCOUNTER — OFFICE VISIT (OUTPATIENT)
Dept: PHYSICAL THERAPY | Facility: HOSPITAL | Age: 77
End: 2022-11-28
Attending: ORTHOPAEDIC SURGERY
Payer: MEDICARE

## 2022-11-28 PROCEDURE — 97140 MANUAL THERAPY 1/> REGIONS: CPT

## 2022-11-28 PROCEDURE — 97110 THERAPEUTIC EXERCISES: CPT

## 2022-11-30 ENCOUNTER — OFFICE VISIT (OUTPATIENT)
Dept: PHYSICAL THERAPY | Facility: HOSPITAL | Age: 77
End: 2022-11-30
Attending: ORTHOPAEDIC SURGERY
Payer: MEDICARE

## 2022-11-30 PROCEDURE — 97140 MANUAL THERAPY 1/> REGIONS: CPT

## 2022-11-30 PROCEDURE — 97110 THERAPEUTIC EXERCISES: CPT

## 2022-12-01 ENCOUNTER — TELEPHONE (OUTPATIENT)
Dept: PHYSICAL THERAPY | Facility: HOSPITAL | Age: 77
End: 2022-12-01

## 2022-12-06 ENCOUNTER — TELEPHONE (OUTPATIENT)
Dept: PHYSICAL THERAPY | Facility: HOSPITAL | Age: 77
End: 2022-12-06

## 2022-12-09 DIAGNOSIS — M79.10 MYALGIA: ICD-10-CM

## 2022-12-09 DIAGNOSIS — M25.512 CHRONIC PAIN OF BOTH SHOULDERS: ICD-10-CM

## 2022-12-09 DIAGNOSIS — M47.816 LUMBAR SPONDYLOSIS: ICD-10-CM

## 2022-12-09 DIAGNOSIS — M99.9 BIOMECHANICAL LESION: ICD-10-CM

## 2022-12-09 DIAGNOSIS — M67.919 TENDINOPATHY OF ROTATOR CUFF, UNSPECIFIED LATERALITY: ICD-10-CM

## 2022-12-09 DIAGNOSIS — M47.816 FACET SYNDROME, LUMBAR: ICD-10-CM

## 2022-12-09 DIAGNOSIS — M48.061 LUMBAR FORAMINAL STENOSIS: ICD-10-CM

## 2022-12-09 DIAGNOSIS — M25.511 CHRONIC PAIN OF BOTH SHOULDERS: ICD-10-CM

## 2022-12-09 DIAGNOSIS — G89.29 CHRONIC PAIN OF BOTH SHOULDERS: ICD-10-CM

## 2022-12-09 DIAGNOSIS — M51.36 BULGE OF LUMBAR DISC WITHOUT MYELOPATHY: ICD-10-CM

## 2022-12-09 DIAGNOSIS — M51.37 DDD (DEGENERATIVE DISC DISEASE), LUMBOSACRAL: ICD-10-CM

## 2022-12-09 DIAGNOSIS — M54.59 MECHANICAL LOW BACK PAIN: ICD-10-CM

## 2022-12-09 DIAGNOSIS — M54.59 LUMBAR TRIGGER POINT SYNDROME: ICD-10-CM

## 2022-12-12 RX ORDER — SERTRALINE HYDROCHLORIDE 100 MG/1
TABLET, FILM COATED ORAL
Qty: 30 TABLET | Refills: 0 | Status: SHIPPED | OUTPATIENT
Start: 2022-12-12

## 2022-12-12 NOTE — TELEPHONE ENCOUNTER
Refill Request    Medication request: SERTRALINE 100 MG Oral Tab. TAKE 1 TABLET(100 MG) BY MOUTH EVERY NIGHT. Falguni Lucio MD   Due back to clinic per last office note: RTC post injection f/u  NOV: Visit date not found      [place]: 11/01/2022 #30    Urine drug screen (if applicable): n/a  Pain contract: n/a    LOV plan (if weaning or changing medications): No mention of Sertraline in LOV visit or previous OV note.

## 2022-12-15 ENCOUNTER — TELEMEDICINE (OUTPATIENT)
Dept: FAMILY MEDICINE CLINIC | Facility: CLINIC | Age: 77
End: 2022-12-15

## 2022-12-15 DIAGNOSIS — M25.561 ACUTE PAIN OF RIGHT KNEE: ICD-10-CM

## 2022-12-15 DIAGNOSIS — F33.42 RECURRENT MAJOR DEPRESSIVE DISORDER, IN FULL REMISSION (HCC): ICD-10-CM

## 2022-12-15 DIAGNOSIS — Z71.89 HEARING AID CONSULTATION: Primary | ICD-10-CM

## 2022-12-15 PROCEDURE — 99214 OFFICE O/P EST MOD 30 MIN: CPT | Performed by: FAMILY MEDICINE

## 2022-12-15 RX ORDER — HYDROCODONE BITARTRATE AND ACETAMINOPHEN 5; 325 MG/1; MG/1
1 TABLET ORAL 2 TIMES DAILY PRN
Qty: 20 TABLET | Refills: 0 | Status: SHIPPED | OUTPATIENT
Start: 2022-12-15

## 2022-12-15 RX ORDER — PREGABALIN 100 MG/1
100 CAPSULE ORAL 2 TIMES DAILY
Qty: 180 CAPSULE | Refills: 0 | Status: SHIPPED | OUTPATIENT
Start: 2022-12-15

## 2022-12-15 NOTE — PROGRESS NOTES
Virtual Telephone Check-In    [facility] verbally consents to a Virtual/Telephone Check-In service on 12/15/22. Patient understands and accepts financial responsibility for any deductible, co-insurance and/or co-pays associated with this service. Duration of the service: 15 minutes  This telemedicine visit was conducted using live audio and video. Summary of topics discussed:     Patient accompanied by daughter who helps with most of HPI as patient does not have hearing aids that are currently working. He describes increased pain in her right knee. She is s/p right knee arthroscopies, partial medial meniscectomy and debridement with ortho surgery 10/23. Patient states NSAIDs and tramadol not helping. Does not recall Norco helped her. Has not prescription for Lyrica. Has upcoming appointment with Ortho. States that the knee sometimes gets more red and swollen. Has been icing it. Not currently right foot is swollen. No fevers, chills, nausea or vomiting. Needs new hearing aids, daughter is going to take her to [facility] and appreciates audiology referral.  Depression is getting worse. Physical Exam:  General: alert, speaking in full sentences, no acute distress  Lungs: respirations sound unlabored, no audible wheezing with speaking. Neurologic: alert, oriented x3    Assessment and plan:    1. Hearing aid consultation  - OP REFERRAL TO AUDIOLOGY    2. Acute pain of right knee  [place]. Appropriate use discussed with patient. Has upcoming follow-up with orthopedic surgery. For now can try Norco for severe pain, Tylenol for mild to moderate pain. Can also try diclofenac gel. Requesting refill of the pregabalin. -   - HYDROcodone-acetaminophen ([address]) 5-325 MG Oral Tab; Take 1 tablet by mouth 2 (two) times daily as needed for Pain. Dispense: 20 tablet; Refill: 0  - diclofenac 1 % External Gel; Apply 2 g topically 4 (four) times daily.   Dispense: 150 g; Refill: 0  - pregabalin 100 MG Oral Cap; Take 1 capsule (100 mg total) by mouth 2 (two) times daily. Dispense: 180 capsule; Refill: 0    3. Recurrent major depressive disorder, in full remission ([facility] [address]  -Depression likely worsening due to need for new hearing aid and worsening pain in her right knee  Continue sertraline. Has follow-up Medicare annual wellness visit scheduled for 1/23    Advised to call back clinic if no improvement in symptoms. Red flags discussed to go to KHOI Carmona MD

## 2022-12-22 ENCOUNTER — OFFICE VISIT (OUTPATIENT)
Dept: ORTHOPEDICS CLINIC | Facility: CLINIC | Age: 77
End: 2022-12-22
Payer: MEDICARE

## 2022-12-22 VITALS — WEIGHT: 130 LBS | HEIGHT: 59 IN | BODY MASS INDEX: 26.21 KG/M2

## 2022-12-22 DIAGNOSIS — S83.231A COMPLEX TEAR OF MEDIAL MENISCUS OF RIGHT KNEE AS CURRENT INJURY, INITIAL ENCOUNTER: Primary | ICD-10-CM

## 2022-12-22 DIAGNOSIS — M17.11 PRIMARY OSTEOARTHRITIS OF RIGHT KNEE: ICD-10-CM

## 2022-12-22 LAB
BASOPHILS NFR SNV: 0 %
EOSINOPHIL NFR SNV: 0 %
LYMPHOCYTES NFR SNV: 64 %
MONOS+MACROS NFR SNV: 35 %
NEUTROPHILS NFR FLD: 1 %
RBC # FLD AUTO: ABNORMAL /CUMM (ref ?–1)
TOTAL CELLS COUNTED FLD: 100
TOTAL CELLS COUNTED SNV: 526 /CUMM (ref 0–200)
WBC # SNV: 526 /CUMM

## 2022-12-22 PROCEDURE — 99024 POSTOP FOLLOW-UP VISIT: CPT | Performed by: ORTHOPAEDIC SURGERY

## 2022-12-22 PROCEDURE — 20610 DRAIN/INJ JOINT/BURSA W/O US: CPT | Performed by: ORTHOPAEDIC SURGERY

## 2022-12-22 RX ORDER — TRIAMCINOLONE ACETONIDE 40 MG/ML
40 INJECTION, SUSPENSION INTRA-ARTICULAR; INTRAMUSCULAR ONCE
Status: COMPLETED | OUTPATIENT
Start: 2022-12-22 | End: 2022-12-22

## 2022-12-22 NOTE — PROCEDURES
Per verbal order from Dr. Jonatan Garcia draw up 3ml of 0.5% Marcaine & 2ml 1% lidocaine and 1ml of Kenalog 40 for cortisone injection to right knee.

## 2022-12-23 LAB — CRYSTALS FLD MICRO: POSITIVE

## 2022-12-27 RX ORDER — NAPROXEN 500 MG/1
TABLET ORAL
Qty: 60 TABLET | Refills: 0 | Status: SHIPPED | OUTPATIENT
Start: 2022-12-27

## 2023-01-05 ENCOUNTER — OFFICE VISIT (OUTPATIENT)
Dept: FAMILY MEDICINE CLINIC | Facility: CLINIC | Age: 78
End: 2023-01-05
Payer: MEDICARE

## 2023-01-05 VITALS
SYSTOLIC BLOOD PRESSURE: 124 MMHG | DIASTOLIC BLOOD PRESSURE: 70 MMHG | TEMPERATURE: 97 F | BODY MASS INDEX: 26.81 KG/M2 | HEIGHT: 59 IN | HEART RATE: 62 BPM | WEIGHT: 133 LBS

## 2023-01-05 DIAGNOSIS — R29.898 TRANSIENT RIGHT LEG WEAKNESS: ICD-10-CM

## 2023-01-05 DIAGNOSIS — D64.9 ANEMIA, UNSPECIFIED TYPE: ICD-10-CM

## 2023-01-05 DIAGNOSIS — M25.512 CHRONIC PAIN OF BOTH SHOULDERS: ICD-10-CM

## 2023-01-05 DIAGNOSIS — E78.2 MIXED HYPERLIPIDEMIA: ICD-10-CM

## 2023-01-05 DIAGNOSIS — Z12.11 COLON CANCER SCREENING: ICD-10-CM

## 2023-01-05 DIAGNOSIS — I10 ESSENTIAL HYPERTENSION: ICD-10-CM

## 2023-01-05 DIAGNOSIS — F33.1 MODERATE EPISODE OF RECURRENT MAJOR DEPRESSIVE DISORDER (HCC): ICD-10-CM

## 2023-01-05 DIAGNOSIS — M47.816 LUMBAR SPONDYLOSIS: ICD-10-CM

## 2023-01-05 DIAGNOSIS — H54.7 VISUAL IMPAIRMENT: ICD-10-CM

## 2023-01-05 DIAGNOSIS — D70.9 NEUTROPENIC COLITIS (HCC): ICD-10-CM

## 2023-01-05 DIAGNOSIS — C85.89 PRIMARY CNS LYMPHOMA (HCC): ICD-10-CM

## 2023-01-05 DIAGNOSIS — M99.9 BIOMECHANICAL LESION: ICD-10-CM

## 2023-01-05 DIAGNOSIS — G89.29 CHRONIC PAIN OF BOTH SHOULDERS: ICD-10-CM

## 2023-01-05 DIAGNOSIS — K52.89 NEUTROPENIC COLITIS (HCC): ICD-10-CM

## 2023-01-05 DIAGNOSIS — M54.42 CHRONIC BILATERAL LOW BACK PAIN WITH BILATERAL SCIATICA: ICD-10-CM

## 2023-01-05 DIAGNOSIS — E21.3 HYPERPARATHYROIDISM (HCC): ICD-10-CM

## 2023-01-05 DIAGNOSIS — R91.8 PULMONARY NODULES: ICD-10-CM

## 2023-01-05 DIAGNOSIS — M54.41 CHRONIC BILATERAL LOW BACK PAIN WITH BILATERAL SCIATICA: ICD-10-CM

## 2023-01-05 DIAGNOSIS — Z23 NEED FOR SHINGLES VACCINE: ICD-10-CM

## 2023-01-05 DIAGNOSIS — R90.89 ABNORMAL CT OF BRAIN: ICD-10-CM

## 2023-01-05 DIAGNOSIS — Z78.0 POST-MENOPAUSAL: ICD-10-CM

## 2023-01-05 DIAGNOSIS — M48.061 LUMBAR FORAMINAL STENOSIS: ICD-10-CM

## 2023-01-05 DIAGNOSIS — M67.919 TENDINOPATHY OF ROTATOR CUFF, UNSPECIFIED LATERALITY: ICD-10-CM

## 2023-01-05 DIAGNOSIS — M79.10 MYALGIA: ICD-10-CM

## 2023-01-05 DIAGNOSIS — M51.36 BULGE OF LUMBAR DISC WITHOUT MYELOPATHY: ICD-10-CM

## 2023-01-05 DIAGNOSIS — Z71.89 HEARING AID CONSULTATION: ICD-10-CM

## 2023-01-05 DIAGNOSIS — M81.0 AGE-RELATED OSTEOPOROSIS WITHOUT CURRENT PATHOLOGICAL FRACTURE: ICD-10-CM

## 2023-01-05 DIAGNOSIS — M48.061 SPINAL STENOSIS OF LUMBAR REGION, UNSPECIFIED WHETHER NEUROGENIC CLAUDICATION PRESENT: ICD-10-CM

## 2023-01-05 DIAGNOSIS — E04.9 GOITER: ICD-10-CM

## 2023-01-05 DIAGNOSIS — M25.551 RIGHT HIP PAIN: ICD-10-CM

## 2023-01-05 DIAGNOSIS — Z00.00 ENCOUNTER FOR ANNUAL HEALTH EXAMINATION: Primary | ICD-10-CM

## 2023-01-05 DIAGNOSIS — M51.37 DDD (DEGENERATIVE DISC DISEASE), LUMBOSACRAL: ICD-10-CM

## 2023-01-05 DIAGNOSIS — F03.90 SENILE DEMENTIA, UNCOMPLICATED (HCC): ICD-10-CM

## 2023-01-05 DIAGNOSIS — M51.16 LUMBAR DISC HERNIATION WITH RADICULOPATHY: ICD-10-CM

## 2023-01-05 DIAGNOSIS — G89.29 CHRONIC BILATERAL LOW BACK PAIN WITH BILATERAL SCIATICA: ICD-10-CM

## 2023-01-05 DIAGNOSIS — M25.511 CHRONIC PAIN OF BOTH SHOULDERS: ICD-10-CM

## 2023-01-05 DIAGNOSIS — D69.6 THROMBOCYTOPENIA (HCC): ICD-10-CM

## 2023-01-05 DIAGNOSIS — M54.59 MECHANICAL LOW BACK PAIN: ICD-10-CM

## 2023-01-05 DIAGNOSIS — D49.6 BRAIN TUMOR (HCC): ICD-10-CM

## 2023-01-05 DIAGNOSIS — D49.2 THORACIC SPINE TUMOR: ICD-10-CM

## 2023-01-05 DIAGNOSIS — M48.04 THORACIC SPINAL STENOSIS: ICD-10-CM

## 2023-01-05 PROBLEM — F33.42 RECURRENT MAJOR DEPRESSIVE DISORDER, IN FULL REMISSION (HCC): Status: RESOLVED | Noted: 2022-12-15 | Resolved: 2023-01-05

## 2023-01-05 PROCEDURE — G0439 PPPS, SUBSEQ VISIT: HCPCS | Performed by: FAMILY MEDICINE

## 2023-01-05 RX ORDER — DONEPEZIL HYDROCHLORIDE 10 MG/1
10 TABLET, FILM COATED ORAL EVERY EVENING
Qty: 90 TABLET | Refills: 3 | Status: SHIPPED | OUTPATIENT
Start: 2023-01-05

## 2023-01-05 RX ORDER — ZOSTER VACCINE RECOMBINANT, ADJUVANTED 50 MCG/0.5
0.5 KIT INTRAMUSCULAR ONCE
Qty: 1 EACH | Refills: 0 | Status: SHIPPED | OUTPATIENT
Start: 2023-01-05 | End: 2023-01-05

## 2023-01-05 RX ORDER — ALENDRONATE SODIUM 70 MG/1
70 TABLET ORAL WEEKLY
COMMUNITY
Start: 2022-12-22

## 2023-01-06 ENCOUNTER — LAB ENCOUNTER (OUTPATIENT)
Dept: LAB | Facility: HOSPITAL | Age: 78
End: 2023-01-06
Attending: FAMILY MEDICINE
Payer: MEDICARE

## 2023-01-06 DIAGNOSIS — Z00.00 ENCOUNTER FOR ANNUAL HEALTH EXAMINATION: ICD-10-CM

## 2023-01-06 LAB
ALBUMIN SERPL-MCNC: 3.5 G/DL (ref 3.4–5)
ALBUMIN/GLOB SERPL: 0.9 {RATIO} (ref 1–2)
ALP LIVER SERPL-CCNC: 98 U/L
ALT SERPL-CCNC: 22 U/L
ANION GAP SERPL CALC-SCNC: 2 MMOL/L (ref 0–18)
AST SERPL-CCNC: 16 U/L (ref 15–37)
BASOPHILS # BLD AUTO: 0.05 X10(3) UL (ref 0–0.2)
BASOPHILS NFR BLD AUTO: 0.9 %
BILIRUB SERPL-MCNC: 0.5 MG/DL (ref 0.1–2)
BUN BLD-MCNC: 22 MG/DL (ref 7–18)
BUN/CREAT SERPL: 25 (ref 10–20)
CALCIUM BLD-MCNC: 9.2 MG/DL (ref 8.5–10.1)
CHLORIDE SERPL-SCNC: 109 MMOL/L (ref 98–112)
CHOLEST SERPL-MCNC: 216 MG/DL (ref ?–200)
CO2 SERPL-SCNC: 29 MMOL/L (ref 21–32)
CREAT BLD-MCNC: 0.88 MG/DL
DEPRECATED RDW RBC AUTO: 43.3 FL (ref 35.1–46.3)
EOSINOPHIL # BLD AUTO: 0.17 X10(3) UL (ref 0–0.7)
EOSINOPHIL NFR BLD AUTO: 3.2 %
ERYTHROCYTE [DISTWIDTH] IN BLOOD BY AUTOMATED COUNT: 12.5 % (ref 11–15)
EST. AVERAGE GLUCOSE BLD GHB EST-MCNC: 103 MG/DL (ref 68–126)
FASTING PATIENT LIPID ANSWER: YES
FASTING STATUS PATIENT QL REPORTED: YES
GFR SERPLBLD BASED ON 1.73 SQ M-ARVRAT: 68 ML/MIN/1.73M2 (ref 60–?)
GLOBULIN PLAS-MCNC: 3.8 G/DL (ref 2.8–4.4)
GLUCOSE BLD-MCNC: 83 MG/DL (ref 70–99)
HBA1C MFR BLD: 5.2 % (ref ?–5.7)
HCT VFR BLD AUTO: 38.6 %
HDLC SERPL-MCNC: 80 MG/DL (ref 40–59)
HGB BLD-MCNC: 12.3 G/DL
IMM GRANULOCYTES # BLD AUTO: 0.01 X10(3) UL (ref 0–1)
IMM GRANULOCYTES NFR BLD: 0.2 %
LDLC SERPL CALC-MCNC: 120 MG/DL (ref ?–100)
LYMPHOCYTES # BLD AUTO: 1.82 X10(3) UL (ref 1–4)
LYMPHOCYTES NFR BLD AUTO: 34.3 %
MCH RBC QN AUTO: 30.4 PG (ref 26–34)
MCHC RBC AUTO-ENTMCNC: 31.9 G/DL (ref 31–37)
MCV RBC AUTO: 95.5 FL
MONOCYTES # BLD AUTO: 0.5 X10(3) UL (ref 0.1–1)
MONOCYTES NFR BLD AUTO: 9.4 %
NEUTROPHILS # BLD AUTO: 2.76 X10 (3) UL (ref 1.5–7.7)
NEUTROPHILS # BLD AUTO: 2.76 X10(3) UL (ref 1.5–7.7)
NEUTROPHILS NFR BLD AUTO: 52 %
NONHDLC SERPL-MCNC: 136 MG/DL (ref ?–130)
OSMOLALITY SERPL CALC.SUM OF ELEC: 292 MOSM/KG (ref 275–295)
PLATELET # BLD AUTO: 145 10(3)UL (ref 150–450)
POTASSIUM SERPL-SCNC: 4.3 MMOL/L (ref 3.5–5.1)
PROT SERPL-MCNC: 7.3 G/DL (ref 6.4–8.2)
RBC # BLD AUTO: 4.04 X10(6)UL
SODIUM SERPL-SCNC: 140 MMOL/L (ref 136–145)
TRIGL SERPL-MCNC: 94 MG/DL (ref 30–149)
TSI SER-ACNC: 2.51 MIU/ML (ref 0.36–3.74)
VLDLC SERPL CALC-MCNC: 16 MG/DL (ref 0–30)
WBC # BLD AUTO: 5.3 X10(3) UL (ref 4–11)

## 2023-01-06 PROCEDURE — 84443 ASSAY THYROID STIM HORMONE: CPT

## 2023-01-06 PROCEDURE — 85025 COMPLETE CBC W/AUTO DIFF WBC: CPT

## 2023-01-06 PROCEDURE — 83036 HEMOGLOBIN GLYCOSYLATED A1C: CPT

## 2023-01-06 PROCEDURE — 36415 COLL VENOUS BLD VENIPUNCTURE: CPT

## 2023-01-06 PROCEDURE — 80053 COMPREHEN METABOLIC PANEL: CPT

## 2023-01-06 PROCEDURE — 80061 LIPID PANEL: CPT

## 2023-01-12 RX ORDER — DICLOFENAC SODIUM 75 MG/1
TABLET, DELAYED RELEASE ORAL
Qty: 180 TABLET | Refills: 0 | Status: SHIPPED | OUTPATIENT
Start: 2023-01-12

## 2023-01-12 NOTE — TELEPHONE ENCOUNTER
Refill Request    Medication request: DICLOFENAC 75 MG Oral Tab EC TAKE 1 TABLET BY MOUTH TWICE DAILY    LOV:8/16/2022-inj, 8/8/22-LOV Stanley Jaffe MD   Due back to clinic per last office note:  \"RTC in 2 to 3 weeks for right knee injection\"  NOV: Visit date not found      [place]: 9/28/22 #180- 90 day supply    Urine drug screen (if applicable): n/a  Pain contract: none    LOV plan (if weaning or changing medications): Per Dr.Behar's discharge instructions on 8/8/22: \"4) Continue Diclofenac and Tylenol as needed\"

## 2023-01-13 ENCOUNTER — LAB ENCOUNTER (OUTPATIENT)
Dept: LAB | Facility: HOSPITAL | Age: 78
End: 2023-01-13
Attending: FAMILY MEDICINE
Payer: MEDICARE

## 2023-01-13 DIAGNOSIS — Z12.11 COLON CANCER SCREENING: ICD-10-CM

## 2023-01-13 LAB — HEMOCCULT STL QL: POSITIVE

## 2023-01-13 PROCEDURE — 82274 ASSAY TEST FOR BLOOD FECAL: CPT

## 2023-01-14 ENCOUNTER — HOSPITAL ENCOUNTER (EMERGENCY)
Facility: HOSPITAL | Age: 78
Discharge: HOME | End: 2023-01-14
Attending: EMERGENCY MEDICINE
Payer: MEDICARE

## 2023-01-14 VITALS
TEMPERATURE: 97 F | HEIGHT: 60 IN | OXYGEN SATURATION: 96 % | DIASTOLIC BLOOD PRESSURE: 73 MMHG | RESPIRATION RATE: 18 BRPM | HEART RATE: 62 BPM | SYSTOLIC BLOOD PRESSURE: 165 MMHG | BODY MASS INDEX: 26.31 KG/M2 | WEIGHT: 134 LBS

## 2023-01-14 DIAGNOSIS — I10 HYPERTENSION, UNSPECIFIED TYPE: ICD-10-CM

## 2023-01-14 DIAGNOSIS — R10.9 ABDOMINAL PAIN, ACUTE: Primary | ICD-10-CM

## 2023-01-14 DIAGNOSIS — R11.2 NAUSEA AND VOMITING IN ADULT: ICD-10-CM

## 2023-01-14 DIAGNOSIS — D69.6 THROMBOCYTOPENIA (HCC): ICD-10-CM

## 2023-01-14 LAB
ALBUMIN SERPL-MCNC: 3.4 G/DL (ref 3.4–5)
ALP LIVER SERPL-CCNC: 95 U/L
ALT SERPL-CCNC: 22 U/L
ANION GAP SERPL CALC-SCNC: 7 MMOL/L (ref 0–18)
AST SERPL-CCNC: 20 U/L (ref 15–37)
BASOPHILS # BLD AUTO: 0.03 X10(3) UL (ref 0–0.2)
BASOPHILS NFR BLD AUTO: 0.6 %
BILIRUB DIRECT SERPL-MCNC: 0.1 MG/DL (ref 0–0.2)
BILIRUB SERPL-MCNC: 0.3 MG/DL (ref 0.1–2)
BILIRUB UR QL: NEGATIVE
BUN BLD-MCNC: 11 MG/DL (ref 7–18)
BUN/CREAT SERPL: 13.9 (ref 10–20)
CALCIUM BLD-MCNC: 9.8 MG/DL (ref 8.5–10.1)
CHLORIDE SERPL-SCNC: 106 MMOL/L (ref 98–112)
CLARITY UR: CLEAR
CO2 SERPL-SCNC: 27 MMOL/L (ref 21–32)
COLOR UR: YELLOW
CREAT BLD-MCNC: 0.79 MG/DL
DEPRECATED RDW RBC AUTO: 43.2 FL (ref 35.1–46.3)
EOSINOPHIL # BLD AUTO: 0.09 X10(3) UL (ref 0–0.7)
EOSINOPHIL NFR BLD AUTO: 1.9 %
ERYTHROCYTE [DISTWIDTH] IN BLOOD BY AUTOMATED COUNT: 12.5 % (ref 11–15)
GFR SERPLBLD BASED ON 1.73 SQ M-ARVRAT: 77 ML/MIN/1.73M2 (ref 60–?)
GLUCOSE BLD-MCNC: 116 MG/DL (ref 70–99)
GLUCOSE UR-MCNC: NEGATIVE MG/DL
HCT VFR BLD AUTO: 36.4 %
HGB BLD-MCNC: 11.7 G/DL
HGB UR QL STRIP.AUTO: NEGATIVE
IMM GRANULOCYTES # BLD AUTO: 0.01 X10(3) UL (ref 0–1)
IMM GRANULOCYTES NFR BLD: 0.2 %
KETONES UR-MCNC: NEGATIVE MG/DL
LIPASE SERPL-CCNC: 138 U/L (ref 73–393)
LYMPHOCYTES # BLD AUTO: 1.13 X10(3) UL (ref 1–4)
LYMPHOCYTES NFR BLD AUTO: 24.5 %
MCH RBC QN AUTO: 30.4 PG (ref 26–34)
MCHC RBC AUTO-ENTMCNC: 32.1 G/DL (ref 31–37)
MCV RBC AUTO: 94.5 FL
MONOCYTES # BLD AUTO: 0.38 X10(3) UL (ref 0.1–1)
MONOCYTES NFR BLD AUTO: 8.2 %
NEUTROPHILS # BLD AUTO: 2.98 X10 (3) UL (ref 1.5–7.7)
NEUTROPHILS # BLD AUTO: 2.98 X10(3) UL (ref 1.5–7.7)
NEUTROPHILS NFR BLD AUTO: 64.6 %
NITRITE UR QL STRIP.AUTO: NEGATIVE
OSMOLALITY SERPL CALC.SUM OF ELEC: 290 MOSM/KG (ref 275–295)
PH UR: 6 [PH] (ref 5–8)
PLATELET # BLD AUTO: 122 10(3)UL (ref 150–450)
POTASSIUM SERPL-SCNC: 3.4 MMOL/L (ref 3.5–5.1)
PROT SERPL-MCNC: 7.3 G/DL (ref 6.4–8.2)
PROT UR-MCNC: NEGATIVE MG/DL
RBC # BLD AUTO: 3.85 X10(6)UL
SODIUM SERPL-SCNC: 140 MMOL/L (ref 136–145)
SP GR UR STRIP: 1.02 (ref 1–1.03)
UROBILINOGEN UR STRIP-ACNC: 0.2
WBC # BLD AUTO: 4.6 X10(3) UL (ref 4–11)

## 2023-01-14 PROCEDURE — 85025 COMPLETE CBC W/AUTO DIFF WBC: CPT | Performed by: EMERGENCY MEDICINE

## 2023-01-14 PROCEDURE — 99284 EMERGENCY DEPT VISIT MOD MDM: CPT

## 2023-01-14 PROCEDURE — S0028 INJECTION, FAMOTIDINE, 20 MG: HCPCS | Performed by: EMERGENCY MEDICINE

## 2023-01-14 PROCEDURE — 96375 TX/PRO/DX INJ NEW DRUG ADDON: CPT

## 2023-01-14 PROCEDURE — 87086 URINE CULTURE/COLONY COUNT: CPT | Performed by: EMERGENCY MEDICINE

## 2023-01-14 PROCEDURE — 81001 URINALYSIS AUTO W/SCOPE: CPT | Performed by: EMERGENCY MEDICINE

## 2023-01-14 PROCEDURE — 81015 MICROSCOPIC EXAM OF URINE: CPT | Performed by: EMERGENCY MEDICINE

## 2023-01-14 PROCEDURE — 80048 BASIC METABOLIC PNL TOTAL CA: CPT | Performed by: EMERGENCY MEDICINE

## 2023-01-14 PROCEDURE — 96374 THER/PROPH/DIAG INJ IV PUSH: CPT

## 2023-01-14 PROCEDURE — 83690 ASSAY OF LIPASE: CPT | Performed by: EMERGENCY MEDICINE

## 2023-01-14 PROCEDURE — 80076 HEPATIC FUNCTION PANEL: CPT | Performed by: EMERGENCY MEDICINE

## 2023-01-14 PROCEDURE — 96361 HYDRATE IV INFUSION ADD-ON: CPT

## 2023-01-14 RX ORDER — ONDANSETRON 2 MG/ML
4 INJECTION INTRAMUSCULAR; INTRAVENOUS ONCE
Status: COMPLETED | OUTPATIENT
Start: 2023-01-14 | End: 2023-01-14

## 2023-01-14 RX ORDER — FAMOTIDINE 10 MG/ML
20 INJECTION, SOLUTION INTRAVENOUS ONCE
Status: COMPLETED | OUTPATIENT
Start: 2023-01-14 | End: 2023-01-14

## 2023-01-14 RX ORDER — FAMOTIDINE 20 MG/1
20 TABLET, FILM COATED ORAL 2 TIMES DAILY PRN
Qty: 30 TABLET | Refills: 0 | Status: SHIPPED | OUTPATIENT
Start: 2023-01-14 | End: 2023-02-13

## 2023-01-14 RX ORDER — ONDANSETRON 4 MG/1
4 TABLET, ORALLY DISINTEGRATING ORAL EVERY 4 HOURS PRN
Qty: 10 TABLET | Refills: 0 | Status: SHIPPED | OUTPATIENT
Start: 2023-01-14 | End: 2023-01-21

## 2023-01-14 NOTE — ED INITIAL ASSESSMENT (HPI)
Pt. Reports nausea and vomiting x 3 days as well as shivering and back pain and fatigue. Denies Diarrhea.

## 2023-01-17 ENCOUNTER — TELEPHONE (OUTPATIENT)
Dept: FAMILY MEDICINE CLINIC | Facility: CLINIC | Age: 78
End: 2023-01-17

## 2023-01-17 NOTE — TELEPHONE ENCOUNTER
Spoke with Issa King ( caregiver )  Issa King was asking  If patient was contagious , patient symptoms have improved     Advised good handwashing when in contact with patient    Advised to call back if symptoms and/ or condition worsens      Libby  verbalizes understanding and agrees.

## 2023-01-17 NOTE — TELEPHONE ENCOUNTER
Monique Thompson who said she is caregiver would like to know if there is contamination on pts UA labs?  Please advise

## 2023-01-21 ENCOUNTER — TELEPHONE (OUTPATIENT)
Dept: FAMILY MEDICINE CLINIC | Facility: CLINIC | Age: 78
End: 2023-01-21

## 2023-01-21 DIAGNOSIS — R19.5 POSITIVE FIT (FECAL IMMUNOCHEMICAL TEST): Primary | ICD-10-CM

## 2023-01-23 ENCOUNTER — TELEPHONE (OUTPATIENT)
Dept: FAMILY MEDICINE CLINIC | Facility: CLINIC | Age: 78
End: 2023-01-23

## 2023-01-23 NOTE — TELEPHONE ENCOUNTER
Verified name and  of patient. Daughter of patient (on MARTIN) calling to schedule ER follow up for patient. Patient was in ER on 23 for abdominal pain.     Appointment scheduled:    Future Appointments   Date Time Provider Louie De Guzmani   2023 11:30 AM Corinne Luna, APRN [facility] ADO   2023  3:00 PM Radha Qureshi MD [address] [employer]   2023 10:40 AM [facility] DEXA [address] DEXA [facility]   3/23/2023 11:30 AM Michelle Rosales MD [facility]

## 2023-01-25 ENCOUNTER — OFFICE VISIT (OUTPATIENT)
Dept: FAMILY MEDICINE CLINIC | Facility: CLINIC | Age: 78
End: 2023-01-25

## 2023-01-25 ENCOUNTER — TELEPHONE (OUTPATIENT)
Dept: FAMILY MEDICINE CLINIC | Facility: CLINIC | Age: 78
End: 2023-01-25

## 2023-01-25 VITALS
BODY MASS INDEX: 27.21 KG/M2 | HEART RATE: 71 BPM | WEIGHT: 135 LBS | HEIGHT: 59 IN | SYSTOLIC BLOOD PRESSURE: 159 MMHG | DIASTOLIC BLOOD PRESSURE: 79 MMHG

## 2023-01-25 DIAGNOSIS — A08.4 VIRAL GASTROENTERITIS: Primary | ICD-10-CM

## 2023-01-25 PROCEDURE — 99213 OFFICE O/P EST LOW 20 MIN: CPT | Performed by: NURSE PRACTITIONER

## 2023-01-25 NOTE — TELEPHONE ENCOUNTER
Patients daughter dropped off parking placard form. Placed in PCP folder for review and sign off. Patients daughter is requesting call once completed. Patients last physical was 1/05/23.

## 2023-01-26 ENCOUNTER — MED REC SCAN ONLY (OUTPATIENT)
Dept: FAMILY MEDICINE CLINIC | Facility: CLINIC | Age: 78
End: 2023-01-26

## 2023-01-27 DIAGNOSIS — M79.10 MYALGIA: ICD-10-CM

## 2023-01-27 DIAGNOSIS — M51.37 DDD (DEGENERATIVE DISC DISEASE), LUMBOSACRAL: ICD-10-CM

## 2023-01-27 DIAGNOSIS — M99.9 BIOMECHANICAL LESION: ICD-10-CM

## 2023-01-27 DIAGNOSIS — M47.816 LUMBAR SPONDYLOSIS: ICD-10-CM

## 2023-01-27 DIAGNOSIS — M54.59 LUMBAR TRIGGER POINT SYNDROME: ICD-10-CM

## 2023-01-27 DIAGNOSIS — M25.512 CHRONIC PAIN OF BOTH SHOULDERS: ICD-10-CM

## 2023-01-27 DIAGNOSIS — M54.59 MECHANICAL LOW BACK PAIN: ICD-10-CM

## 2023-01-27 DIAGNOSIS — M47.816 FACET SYNDROME, LUMBAR: ICD-10-CM

## 2023-01-27 DIAGNOSIS — M25.511 CHRONIC PAIN OF BOTH SHOULDERS: ICD-10-CM

## 2023-01-27 DIAGNOSIS — M51.36 BULGE OF LUMBAR DISC WITHOUT MYELOPATHY: ICD-10-CM

## 2023-01-27 DIAGNOSIS — M67.919 TENDINOPATHY OF ROTATOR CUFF, UNSPECIFIED LATERALITY: ICD-10-CM

## 2023-01-27 DIAGNOSIS — G89.29 CHRONIC PAIN OF BOTH SHOULDERS: ICD-10-CM

## 2023-01-27 DIAGNOSIS — M48.061 LUMBAR FORAMINAL STENOSIS: ICD-10-CM

## 2023-01-30 RX ORDER — SERTRALINE HYDROCHLORIDE 100 MG/1
TABLET, FILM COATED ORAL
Qty: 30 TABLET | Refills: 0 | Status: SHIPPED | OUTPATIENT
Start: 2023-01-30

## 2023-02-02 ENCOUNTER — TELEPHONE (OUTPATIENT)
Dept: GASTROENTEROLOGY | Facility: CLINIC | Age: 78
End: 2023-02-02

## 2023-02-02 ENCOUNTER — OFFICE VISIT (OUTPATIENT)
Dept: GASTROENTEROLOGY | Facility: CLINIC | Age: 78
End: 2023-02-02

## 2023-02-02 VITALS
SYSTOLIC BLOOD PRESSURE: 128 MMHG | BODY MASS INDEX: 27.21 KG/M2 | HEIGHT: 59 IN | HEART RATE: 66 BPM | DIASTOLIC BLOOD PRESSURE: 76 MMHG | WEIGHT: 135 LBS

## 2023-02-02 DIAGNOSIS — Z12.11 COLON CANCER SCREENING: ICD-10-CM

## 2023-02-02 DIAGNOSIS — R19.5 POSITIVE FIT (FECAL IMMUNOCHEMICAL TEST): Primary | ICD-10-CM

## 2023-02-02 PROCEDURE — 99204 OFFICE O/P NEW MOD 45 MIN: CPT | Performed by: INTERNAL MEDICINE

## 2023-02-02 RX ORDER — SODIUM, POTASSIUM,MAG SULFATES 17.5-3.13G
SOLUTION, RECONSTITUTED, ORAL ORAL
Qty: 1 EACH | Refills: 0 | Status: SHIPPED | OUTPATIENT
Start: 2023-02-02

## 2023-02-02 NOTE — PATIENT INSTRUCTIONS
1. Schedule colonoscopy with anesthesia [Diagnosis: Positive FIT]    2.  bowel prep from pharmacy (split dose golytely)    3. Continue all medications for procedure    4. Read all bowel prep instructions carefully    5. AVOID seeds, nuts, popcorn, raw fruits and vegetables (cooked is okay) for 2-3 days before procedure    6. You will need to go for COVID testing 72 hours before procedure. The testing team will call you a few days before your procedure to notify you where/when you can get COVID testing. If you do not go for COVID testing, the procedure cannot be performed. 7. If you start any NEW medication after your visit today, please notify us. Certain medications will need to be held before the procedure, or the procedure cannot be performed.

## 2023-02-02 NOTE — TELEPHONE ENCOUNTER
Scheduled for: Colonoscopy [address]   Provider Name: Dr Debbie Perkins    Date: Wed 2/15/2023   Location: [facility]   Sedation: MAC    Time: 2 pm    Prep: split trilyte   Meds/Allergies Reconciled?: reviewed by provider     Diagnosis with codes: + FIT R19.5   Was patient informed to call insurance with codes (Y/N): Yes   Referral sent?:  Yes    [facility] or [id number]  notified?: I sent an electronic request to Endo Scheduling and received a confirmation today. Medication Orders: Pt is aware to NOT take iron pills, herbal meds and diet supplements for 7 days before exam. Also to NOT take any form of alcohol, recreational drugs and any forms of ED meds 24 hours before exam.      Misc Orders:       Further instructions given by staff:  Instructions given and pt verbalized understanding

## 2023-02-03 ENCOUNTER — TELEPHONE (OUTPATIENT)
Facility: CLINIC | Age: 78
End: 2023-02-03

## 2023-02-08 DIAGNOSIS — M25.561 ACUTE PAIN OF RIGHT KNEE: ICD-10-CM

## 2023-02-09 NOTE — TELEPHONE ENCOUNTER
Lisinopril last ref  7-7-2017 # 30  Pregabalin last ref 12-15-22 # 180  Routed to Triage support for assistance, pls verify with pt who is managing his lisinopril?  thanks.            Refill passed per [facility] protocol   Requested Prescriptions   Pending Prescriptions Disp Refills    LISINOPRIL 20 MG Oral Tab [Pharmacy Med Name: LISINOPRIL 20MG TABLETS] 90 tablet 0     Sig: TAKE 1 TABLET BY MOUTH DAILY       Hypertensive Medications Protocol Passed - 2/8/2023  2:48 PM        Passed - In person appointment in the past 12 or next 3 months     Recent Outpatient Visits              1 week ago Positive FIT (fecal immunochemical test)    [facility], Mai Tuttle MD    Office Visit    2 weeks ago Viral gastroenteritis    [address] [facility], [address] [place] [address]    Office Visit    1 month ago Encounter for annual health examination    [employer] Frandy Pederson [facility]    Office Visit    1 month ago Complex tear of medial meniscus of right knee as current injury, initial encounter    [employer], [address], [place], Apolinar Vance MD    Office Visit    1 month ago Hearing aid consultation    [address] [facility], Frandy Pederson MD    Telemedicine          Future Appointments         Provider Department Appt Notes    In 6 days [facility], [address], [address], Chet Rosario Colonoscopy @ [address]    In 1 week [facility]     In 1 month Eitan Blanco MD [employer], [address], [place] Leg pain    In 1 month Michelle Rosales MD [address] [employer], [place] 3 mo follow up               Passed - Last BP reading less than 140/90     BP Readings from Last 1 Encounters:  02/02/23 : 128/76              Passed - CMP or BMP in past 6 months     Recent Results (from the past 4392 hour(s))   Basic Metabolic Panel (8)    Collection Time: 01/14/23  6:09 PM   Result Value Ref Range    Glucose 116 (H) 70 - 99 mg/dL    Sodium 140 136 - 145 mmol/L    Potassium 3.4 (L) 3.5 - 5.1 mmol/L    Chloride 106 98 - 112 mmol/L    CO2 27.0 21.0 - 32.0 mmol/L    Anion Gap 7 0 - 18 mmol/L    BUN 11 7 - 18 mg/dL    Creatinine 0.79 0.55 - 1.02 mg/dL    BUN/CREA Ratio 13.9 10.0 - 20.0    Calcium, Total 9.8 8.5 - 10.1 mg/dL    Calculated Osmolality 290 275 - 295 mOsm/kg    eGFR-Cr 77 >=60 mL/min/1.73m2     *Note: Due to a large number of results and/or encounters for the requested time period, some results have not been displayed. A complete set of results can be found in Results Review.                Passed - In person appointment or virtual visit in the past 6 months     Recent Outpatient Visits              1 week ago Positive FIT (fecal immunochemical test)    Dea Najjar, MD    Office Visit    2 weeks ago Viral gastroenteritis    [address] [facility], [facility]    Office Visit    1 month ago Encounter for annual health examination    [address], [address], Michael Sheppard MD    Office Visit    1 month ago Complex tear of medial meniscus of right knee as current injury, initial encounter    [address], [address], [place], Jenelle Kang MD    Office Visit    1 month ago Hearing aid consultation    [address] [facility], Michael Sheppard MD    Telemedicine          Future Appointments         Provider Department Appt Notes    In 6 days [facility], [address], [address], [place] Colonoscopy @ [facility]    In 1 week [facility]     In 1 month Hernan Dickerson MD [address] [facility], [place] Leg pain    In 1 month Lauren Donnie Fairbanks MD [facility], [address], [place] 3 mo follow up               Passed [facility] or GFRNAA > 50     GFR Evaluation  EGFRCR: 77 , resulted on 1/14/2023            PREGABALIN 100 MG Oral Cap [Pharmacy Med Name: PREGABALIN 100MG CAPSULES] 180 capsule 0     Sig: TAKE 1 CAPSULE(100 MG) BY MOUTH TWICE DAILY       There is no refill protocol information for this order

## 2023-02-13 RX ORDER — LISINOPRIL 20 MG/1
TABLET ORAL
Qty: 90 TABLET | Refills: 1 | Status: SHIPPED | OUTPATIENT
Start: 2023-02-13

## 2023-02-13 RX ORDER — PREGABALIN 100 MG/1
CAPSULE ORAL
Qty: 180 CAPSULE | Refills: 1 | Status: SHIPPED | OUTPATIENT
Start: 2023-02-13

## 2023-02-13 NOTE — TELEPHONE ENCOUNTER
Pt's daughter stated - Pt was seen last month and Dr advised to call in what meds are needed, stated Pt still takes Lisinopril

## 2023-02-13 NOTE — TELEPHONE ENCOUNTER
VM left for patient to return call.  (Malagasy speaking) Anahi akbarg sent in [address] to patient 2/9/23

## 2023-02-15 NOTE — DISCHARGE INSTRUCTIONS

## 2023-02-15 NOTE — ANESTHESIA POSTPROCEDURE EVALUATION
Patient: Saul Chaudhry    Procedure Summary     Date: 02/15/23 Room / Location: [address] [place] [address] ENDOSCOPY 04 / [facility]note 2[facility] ENDOSCOPY    Anesthesia Start: 1435 Anesthesia Stop:     Procedure: COLONOSCOPY Diagnosis:       Positive FIT (fecal immunochemical test)      (polyp, hemorrhoids)    Surgeons: Gricelda Kamara MD Anesthesiologist: Shahid Cabrera CRNA    Anesthesia Type: general ASA Status: 3          Anesthesia Type: general    Vitals Value Taken Time   /57 02/15/23 1531   Temp  02/15/23 1534   Pulse 62 02/15/23 1531   Resp 18 02/15/23 1531   SpO2 100 % 02/15/23 1531       [facility] AN Post Evaluation:   Patient Evaluated in PACU  Patient Participation: complete - patient participated  Level of Consciousness: awake  Pain Score: 0  Pain Management: adequate  Airway Patency:patent  Yes    Cardiovascular Status: hemodynamically stable  Respiratory Status: room air  Postoperative Hydration acceptable      Nati Lara CRNA  2/15/2023 3:34 PM

## 2023-02-20 NOTE — TELEPHONE ENCOUNTER
Karina Johnson, JASPREET 2/20/2023 10:23 AM CST        ----- Message -----  From: Ale Tabares  Sent: 2/19/2023 1:34 PM CST  To: Em Rn Triage  Subject: Question     Cass chester  My mom Meliza Smith is not feeling to well. She has diarrhea and stomach pain. Please advise on what we can do to help her out?      Thank you   Natan Mauricio

## 2023-02-20 NOTE — TELEPHONE ENCOUNTER
----- Message from Marlyn Summers RN sent at 2/20/2023 10:23 AM CST -----  Regarding: FW: Question   Contact: [phone]      ----- Message -----  From: Pita Carter  Sent: 2/19/2023   1:34 PM CST  To: Em Rn Triage  Subject: Question                                         Cass chester  My mom Nicole Giron is not feeling to well. She has diarrhea and stomach pain. Please advise on what we can do to help her out?      Thank you   Enid Ramos

## 2023-02-20 NOTE — TELEPHONE ENCOUNTER
Patient's daughter sent my chart message copied below. Upon speaking with her Munira  advised her mom drank a gallon of go-lytly for recent colonoscopy. She is still experiencing stomach cramping and diarrhea. Patient was also seen in the office on 1/25/23 for viral gastritis. Per daughter, they are having patient drink Pedialyte and I advised BRAT diet as well as to avoid greasy/ spicy foods and continue current treatment. Daughter advised she is not with her mom but will let us know after speaking with mom if she needs a follow up appointment.

## 2023-02-25 NOTE — ED INITIAL ASSESSMENT (HPI)
Reports pain initially started to neck and now radiates to back & left shoulder. Pt having trouble sleeping due to pain. Pt denies any injury.

## 2023-02-27 NOTE — TELEPHONE ENCOUNTER
Refill Request    Medication request: SERTRALINE 100 MG Oral Tab  TAKE 1 TABLET(100 MG) BY MOUTH EVERY NIGHT    LOV:8/16/2022 Alena Cummings MD     Due back to clinic per last office note: not mentioned at [facility] [address]: 3/13/2023 Alena Cummings MD      [place]: 1/30/23 #30    Urine drug screen (if applicable): na  Pain contract: na    LOV plan (if weaning or changing medications): None advised    Please review and sign if appropriate.

## 2023-03-02 NOTE — TELEPHONE ENCOUNTER
Health maintenance updated. Last colonoscopy done 2/15/23. 5 year recall placed into Pt Outreach, next due on 2/15/28 per Dr. Lilly Jackson.

## 2023-03-02 NOTE — TELEPHONE ENCOUNTER
----- Message from Domonique Weaver MD sent at 3/1/2023  4:40 PM CST -----  GI staff: please place recall for colonoscopy in 5 years

## 2023-03-28 NOTE — TELEPHONE ENCOUNTER
Patient does not need an appointment with Dr Jose A Orosco to go over results. This MRI is for right total knee arthroplasty, scheduled for 5/29/23.

## 2023-03-29 NOTE — TELEPHONE ENCOUNTER
Type of surgery:  Right total knee arthroplasty  Date: 5/29/23  Location: [facility]  Medical Clearance:      *Medical: Yes      *Dental: Yes      *Other:  Prior Authorization Status: Pending  Workers Comp:  Medacta/Faina:[id number]  [place]: Yes  POV: 6/14/23

## 2023-04-05 NOTE — TELEPHONE ENCOUNTER
Refill Request    Medication request: SERTRALINE 100 MG Oral Tab  TAKE 1 TABLET(100 MG) BY MOUTH EVERY NIGHT    LOV:8/16/2022 Raghav Fajardo MD   Due back to clinic per last office note:  Not mentioned at [address] Visit date not found      [place]: 02/27/23 #30    Urine drug screen (if applicable): n/a  Pain contract: n/a    LOV plan (if weaning or changing medications): Not mentioned at [address]. Please review and sign if appropriate.

## 2023-04-07 NOTE — TELEPHONE ENCOUNTER
Please Review. Protocol Failed or has no protocol. Requested Prescriptions   Pending Prescriptions Disp Refills    donepezil 10 MG Oral Tab 90 tablet 3     Sig: Take 1 tablet (10 mg total) by mouth every evening.        There is no refill protocol information for this order        Recent Outpatient Visits              2 weeks ago Senile dementia, uncomplicated ([address]    Chelo Boyer, Willian Stanley MD    Office Visit    2 weeks ago Post-traumatic osteoarthritis of right knee    [facility], [address], [place], Dami David MD    Office Visit    2 months ago Positive FIT (fecal immunochemical test)    Chelo Boyer, Goldie Proctor MD    Office Visit    2 months ago Viral gastroenteritis    [facility], [address] [place] [address]    Office Visit    3 months ago Encounter for annual health examination    Chelo Boyer, Willian Stanley MD    Office Visit          Future Appointments         Provider Department Appt Notes    In 1 week [facility] MRI RM1 (1.5T) [id number] [facility]     In 2 weeks [address], [place] pre-op    In 2 months Antonette Patel PA-C [facility], [address], [place] 1st POV Right total knee Sx 5/29/23

## 2023-04-20 NOTE — TELEPHONE ENCOUNTER
Patients daughter requesting call back. States patient is scheduled for surgery in May and is looking to get Home health care rehab.  Please advise

## 2023-04-21 NOTE — TELEPHONE ENCOUNTER
Called patient's daughter, she was in dialysis and couldn't talk right now. She will call me back when she is done.

## 2023-04-24 NOTE — TELEPHONE ENCOUNTER
Recv'd a phone call from the OR, they are closed on 5/29/23.     Called patient's daughter Rodney Victor surgery date 5/22/23    New POV 6/7/23

## 2023-05-22 NOTE — OPERATIVE REPORT
Operative Note    Patient Name: [facility]    Preoperative Diagnosis: Post-traumatic osteoarthritis of right knee [M17.31]    Postoperative Diagnosis: Post-traumatic osteoarthritis of right knee [M17.31]    Primary Surgeon: Valerie Leyva MD     Assistant: Charlene Gomez    Procedures: R TKA with preoperative computer navigation, PSI    Surgical Findings: above    Anesthesia: Spinal    Complications: none    Specimen: R knee bone and soft tissue to pathology    Drains: hemovac, sprague    Condition: stable to RR    Estimated Blood Loss: Bren Rosenthal MD

## 2023-05-22 NOTE — ANESTHESIA PROCEDURE NOTES
Spinal Block    Date/Time: 5/22/2023 10:12 AM    Performed by: Liam Acosta Si, CRNA  Authorized by: Elaina Nair MD      General Information and Staff    Start Time:  5/22/2023 10:12 AM  End Time:  5/22/2023 10:15 AM  Anesthesiologist:  Elaina Nair MD  CRNA:  Liam Acosta Si, CRNA  Performed by:  CRNA  Patient Location:  OR  Site identification: surface landmarks    Reason for Block: at surgeon's request, post-op pain management and surgical anesthesia    Preanesthetic Checklist: patient identified, IV checked, risks and benefits discussed, monitors and equipment checked, pre-op evaluation, timeout performed, anesthesia consent and sterile technique used      Procedure Details    Patient Position:  Sitting  Prep: ChloraPrep and patient draped    Monitoring:  Cardiac monitor, continuous pulse ox and heart rate  Approach:  Midline  Location:  L3-4  Injection Technique:  Single-shot    Needle    Needle Type:  Quincke  Needle Gauge:  22 G  Needle Length:  3.5 in    Assessment    Sensory Level:  T8  Events: clear CSF, CSF aspirated, well tolerated and blood negative      Additional Comments

## 2023-05-22 NOTE — OPERATIVE REPORT
[facility]    PATIENT'S NAME: Ysabel SKINNER   ATTENDING PHYSICIAN: Darwin Farooq MD   OPERATING PHYSICIAN: Darwin Farooq MD   PATIENT ACCOUNT#:   [id number]    LOCATION:  [address]  MEDICAL RECORD #:   [id number]       YOB: 1945  ADMISSION DATE:       05/22/2023      OPERATION DATE:  05/22/2023    OPERATIVE REPORT      PREOPERATIVE DIAGNOSIS:  Right knee posttraumatic arthritis. POSTOPERATIVE DIAGNOSIS:  Right knee posttraumatic arthritis. PROCEDURE:  Right total knee arthroplasty with patient-specific instrumentation and preoperative computer navigation. ASSISTANT:    1.   Michelle Huang PA-C  2. SHERLY Navarro    ANESTHESIA:  Spinal with sedation. COMPLICATIONS:  None. BLOOD LOSS:  50 mL. SPECIMEN:  Right knee bone and soft tissue to Pathology. DRAINS:  Hemovac drain to suction device, Pulido catheter to gravity drainage. COMPLICATIONS:  None. INDICATIONS:  Patient is a 71-year-old female with a history of progressive right knee pain unresponsive to conservative care. Preoperative physical findings, imaging studies were consistent with end-stage osteoarthritis of the knee. She failed conservative treatments including anti-inflammatory medications, therapeutic exercise, intraarticular injections, activity modifications, and attempts at weight loss. After discussion with the patient the risks and benefits of proceeding with operative treatment of the right knee, she wished to proceed with surgery. OPERATIVE TECHNIQUE:  The patient was identified in the preoperative holding area. The appropriate consents were obtained. She was taken the operating room, placed in supine position on the operating room table. After adequate spinal anesthesia and sedation was achieved, a Pulido catheter was inserted using sterile technique.   A tourniquet was placed on the right thigh and an SCD device was placed on the left lower extremity. The upper extremities were padded and placed on arm boards. The patient was given preoperative IV antibiotics and IV tranexamic acid. Right knee and lower extremity were then prepped and draped in a sterile fashion. The right foot was padded and placed in the foot treadwell for the [facility] type knee positioner. The extremity was then exsanguinated and the tourniquet was inflated to 250 mmHg. A longitudinal incision was then made over the anterior aspect of the knee. A medial parapatellar retinacular incision was made. Portions of the superficial medial collateral ligament were sharply elevated from the anteromedial proximal tibia. The menisci and anterior cruciate ligament were excised. The patella was everted and sized. The patellar thickness was only 20 mm and there were mild degenerative changes noted. Therefore, the decision was made to avoid patellar resurfacing. We then placed the patient-specific pin guide for the distal anterior femur. Anterior and distal pins were placed. Distal pins were removed. Distal cutting guide was inserted and the distal cut was made in the usual fashion in an appropriate valgus orientation. The bone fragments were sized on the back table and appeared consistent with the preoperative plan. Next, the 4-in-1 cutting guide for the size 4 right femur was applied to the cut surface. Anterior, posterior, and chamfer cuts were made. The posterior bone fragments were sized on the back table and appeared consistent with the preoperative plan. We then placed the femoral trial, lateralized the femoral implant trial and drilled the lug holes. The trial was removed and attention was turned to the tibia. The patient-specific pin guide for the anteromedial tibia was applied. The anterior and superior pins were placed. Superior pins were removed. The cutting guide was inserted and the alignment was checked with the long axis of the tibia.   It appeared appropriately aligned in all 3 planes. The tibial cut was made. The bone fragment was removed and sized on the back table and appeared consistent with the preoperative plan. Next, the tibial trial was inserted and aligned with the superior drill holes and secured. The tibia was finished with the reamer and keel cutter. This was a size B tibial implant trial.  We then placed a 10 mm polyethylene bearing trial with the trial implants in place, brought the knee through range of motion. It was adequately stable in all planes. Full range of motion was achieved. There was no impingement on the implants. The trial implants were next removed, and using third-generation cementing technique, we cemented in the size B tibia and a size 4 femur. The excess cement was removed. We trialed and chose a 10 mm polyethylene bearing. This was a medial congruent highly cross-linked vitamin E polyethylene bearing [employer] implant. The knee was again brought through a range of motion. It was adequately stable in all planes. Full range of motion was achieved. There was no impingement on the implants. Tourniquet was deflated. Attention was paid to hemostasis. The joint was copiously irrigated. The retinacular layer was repaired over a 1/8-inch Hemovac drain which exited the anterolateral thigh. We used #1 Ethibond sutures for fascial closure. Skin and subcutaneous layers were closed with 2-0 Vicryl sutures and skin staples. Sterile dressing was applied including a Mepilex and Ace wrap and ice pack. The patient's anesthesia was reversed. She was taken to the recovery room in stable condition. All sponge and instrument counts were reported as correct. The attending physician, Dr. Reyes Ramirez, was present and performed all critical portions of the procedure. There were no complications. First and second assistants were medically necessary for the surgery.   They assisted with patient positioning, retraction of soft tissues for accurate placement of the implants and cutting jigs. They also assisted with bone fragment removal, cement removal, hemostasis, and wound closure. Without the aid of the assistants, the surgical procedure would not have been possible. Dictated By Pato Olivares.  Sera Stout MD  d: 05/22/2023 11:50:26  t: 05/22/2023 12:37:55  Job [id number]  DLO/

## 2023-05-22 NOTE — PLAN OF CARE
Patient is A&OX4, RA, Patient worked with PT is up stand and pivot to chair with two assist.  Sitting in chair with daughter at bedside. Patient is Chinese speaking only. SCDs bilat. Patient had emesis right after eating. Gave zofran. Bed in lowest position and call light within reach. Plan is to go to rehab after clearances.   Problem: Patient Centered Care  Goal: Patient preferences are identified and integrated in the patient's plan of care  Description: Interventions:  - What would you like us to know as we care for you?   - Provide timely, complete, and accurate information to patient/family  - Incorporate patient and family knowledge, values, beliefs, and cultural backgrounds into the planning and delivery of care  - Encourage patient/family to participate in care and decision-making at the level they choose  - Honor patient and family perspectives and choices  Outcome: Progressing

## 2023-05-23 NOTE — PROGRESS NOTES
05/23/23 1537   VISIT TYPE   PT Inpatient Visit Type (Documentation Required) Treatment   Pain   Rating 8   Location right knee with exs and walking   Management Techniques Activity promotion;Breathing techniques;Relaxation;Repositioning; Other (Comment)  (ice applied, medicated prior)   AM-PAC '6 Clicks' Inpatient Short Form - Basic Mobility   Patient Difficulty: Turning over in bed (including adjusting bedclothes, sheets and blankets)? 3   Patient Difficulty: Sitting down on and standing up from a chair with arms (e.g., wheelchair, bedside commode, etc.) 3   Patient Difficulty: Moving from lying on back to sitting on the side of the bed? 3   Help from Another: Moving to and from a bed to a chair (including a wheelchair)? 3   Help from Another: Need to walk in hospital room? 3   Help from Another: Climbing 3-5 steps with a railing? 2   Raw Score 17   Approx Degree of Impairment 50.57%   Standardized Score (AM-PAC Scale) 42.13   CMS Modifier (G-Code) CK   Functional Mobility/Gait Assessment   Gait Assistance Minimum assistance   Distance (ft) 50', 25'   Assistive Device Rolling walker   Pattern R Decreased stance time;R Flexed knee; Shuffle   PT Follow Up   Patient End of Session Up in chair;Needs met;Call light within reach;RN aware of session/findings; All patient questions and concerns addressed; Ice applied; Family present; Discussed recommendations with /   Charge 30 min treatment   Follow Up Needed (Documentation Required) Yes   PT Follow-up Date 05/24/23   Last PT Visit 05/23/23   Rehab Potential  Good   Frequency (Obs) Daily     RN approves participation in therapy session, patient was medicated. Patient presents in bed with different daughter at bedside who reports patient has been in bed since about 1.  Patient reports ongoing knee pain of 6-8/10 with moving, \"un poquito\" at rest. Daughter declines language line and assists therapist with translation/therapist also speaks Anguillan in session, daughter clarifying at times. Patient needs min A to perform heel slides, SLR, hip abd/add and quad sets in bed, performs ankle pumps with cues only all x 10 reps. AROM supine 5-75 degrees, sitting 85* flexion RLE. Reinforced importance of no pillow under R knee and promoting knee extension in bed. She needs min A for RLE off of bed, She performs sit to/from stand with min A and consistent cues for hand placement and RLE positioning, not able to carry over without cues. She ambulates 25' then 48' with RW with step to pattern, unable to perform step through gait due to pain. Cues and assist for correct use of RW, facilitation of R knee extension in stance as pt tolerates. She continues to require min A for all mobility. Daughter reports pt is still a bit off today, expressing pt is not remembering information and is somewhat impulsive. This is relayed to RN Alana. Pt in chair with all needs in reach and family in room, call light in pts lap. Dtr aware to call RN staff if she leaves. Patient is on track for dc to [facility] as she lives alone and does not currently have the skills to return home.

## 2023-05-23 NOTE — CM/SW NOTE
Department  notified of request for [facility], aidin referrals started. Assigned CM/SW to follow up with pt/family on further discharge planning.      Edin Emery   May 23, 2023   11:01

## 2023-05-23 NOTE — PROGRESS NOTES
05/23/23 1126   Clinical Encounter Type   Visited With Patient not available;Health care provider   Routine Visit Follow-up   Surgical Visit Post-op   Referral From Nurse   Jehovah's witness Encounters   Jehovah's witness Needs Prayer   Spiritual Requests During Visit / Hospitalization Communion;Amish Communion   Sacramental Encounters   Communion Given Indicator Yes   Sacrament of Sick-Anointing Family requested anointing   Taxonomy   Interventions Silent prayer     Summary:  received consult for visit, spoke with RN and attempted to visit but patient was with PT. Patient received communion in the morning, requested anointing. Writer will attempt later visit. Spiritual Care support can be requested via an [facility] consult.     -Patricia Monterroso, Chaplain Resident.

## 2023-05-23 NOTE — CM/SW NOTE
05/23/23 1600   / Referral Data   Referral Source Patient   Reason for Referral Discharge planning   Informant Daughter   Patient Info   Patient's Current Mental Status at Time of Assessment Confused or unable to complete assessment   Patient Communication Issues Language barrier   Patient's [address]   Number of Levels in Home 2   Number of Stair in Home 12   Patient lives with Alone   Patient Status Prior to Admission   Independent with ADLs and Mobility Yes   Services in place prior to admission Other (comment)  (homemaker through county)   Discharge Needs   Anticipated D/C needs   ([facility]anna vs hh and 24hr care)       CM attempted to meet with patient/family on multiple visits but patient was having therapy or nursing care. Patient's daughter Eryn Farrell at beside this evening and is aware of patient's home situation and able to translate as patient is primarily [address] speaking. Home address on face sheet confirmed accurate with patient's daughter. Patient lives in a 2 level home, alone. Normally independent with ADLs at baseline. Daughter states patient has a  that was coming to their home but has not come in about a month. CM left message for Louise Moody with [place] [facility] to discuss options for patient. Daughter informed that PT is recommending home with 24hr supervision and HH vs [facility]. CM explained these options in detail. CM discussed with daughter that patient would need 3 medically necessary midnights in the hospital to use medicare benefits for a [facility]. CM also informed daughter that other option would be for patient to go home with [facility]. CM recommended patient stay with daughter for a few weeks if they are concerned about her being home alone. Daughter states she works but they may be able to make arrangements so patient is at least with someone in the evenings and overnight.  List for [facility] and [facility] agencies provided to daughter, she will give us their choice for both options in AM. Again, CM educated daughter that [facility] may not be an option if patient is medically ready for discharge tomorrow, daughter verbalized understanding of discharge options. Daughter would prefer a [facility] agency with [address] speaking staff. Message sent to available [facility] agencies in [facility] to see if they can accommodate. Plan: Home w/ HH vs [facility] pending medical clearance. *Pending discharge date/HH vs [facility] choices.      IZABELA: would need *PASRR  HH: would need F2F    Marry Torres, RN, BSN   A 60 Y/O Female with PMhx of HLD, Uterine fibroids, S/P resection, Sarcoma (inactive), S/p resection/RT, SBO presenting with LUQ pain. Patient had colonoscopy on Tuesday, endorsed onset of lower abdominal pain radiating to the back on Wednesday. Also endorses that pain is pleuritic, denies current fevers, nausea, vomiting, decreased p.o. intake.   Endorses fever x1 day.  Denies cough, dysuria. found ot have pericardial Effusion on CT   Admitted   Dx :  Pleural  Effusion and  Subcapsular  hematoma.  Cardiology  was  consulted  on  this  admission  who  recommends  ECHO at  baseline  and  repeat  Echo  within  48  hours  to  re-evaluate/  Surgery  was  consulted ,  no  intervention noted  at  this  time.     Patient is a 60 Y/O Female with PMhx of HLD, Uterine fibroids, S/P resection, Sarcoma (inactive), S/p resection/RT, SBO presenting with LUQ pain. Patient had colonoscopy on Tuesday, endorsed onset of lower abdominal pain radiating to the back on Wednesday. Also endorses that pain is pleuritic, denies current fevers, nausea, vomiting, decreased p.o. intake.   Endorses fever x1 day.  Denies cough, dysuria. found ot have pericardial Effusion on CT     # Pericardial effusion   - Unknown etiology seen on CT  - TTE with EF of 68%, calcific plaque, small loculated pericardial effusion; discussed with cardio - start ASA once cleared from GI standpoint   - Card eval appreciated; F/u recs  - Serial EKG and CE if symptoms arise  - Thoracic surgery eval consulted; F/u recs-- No intervention at this time     # subcapsular splenic hematoma.  - abdominal discomfort  - Surg eval appreciated; no intervention at this time   - GI eval called ; likely postcolonoscopy trauma   - Start ASA once cleared from GI standpoint     # Sarcoma   on remission   completed course of treatment  surveillance CT yearly     # + UCx  - Patient denies symotoms, 10-49K, monitor off of ABX for now unless symptoms arise     # HLD   statin   DASH diet     # ANxiety/ depresison   home meds    Patient pending GI clearance for ASA.    Patient is a 58 Y/O Female with PMhx of HLD, Uterine fibroids, S/P resection, Sarcoma (inactive), S/p resection/RT, SBO presenting with LUQ pain. Patient had colonoscopy on Tuesday, endorsed onset of lower abdominal pain radiating to the back on Wednesday. Also endorses that pain is pleuritic, denies current fevers, nausea, vomiting, decreased p.o. intake.   Endorses fever x1 day.  Denies cough, dysuria. found ot have pericardial Effusion on CT     # Pericardial effusion   - Unknown etiology seen on CT  - TTE with EF of 68%, calcific plaque, small loculated pericardial effusion; discussed with cardio - start ASA once cleared from GI standpoint   - Card eval appreciated; F/u recs  - Serial EKG and CE if symptoms arise  - Thoracic surgery eval consulted; F/u recs-- No intervention at this time     # subcapsular splenic hematoma.  - abdominal discomfort  - Surg eval appreciated; no intervention at this time   - GI eval called ; likely postcolonoscopy trauma   - Start ASA once cleared from GI standpoint     # Sarcoma   on remission   completed course of treatment  surveillance CT yearly     # + UCx  - Patient denies symotoms, 10-49K, monitor off of ABX for now unless symptoms arise     # HLD   statin   DASH diet     # ANxiety/ depresison   home meds    Patient medically clear per Dr. Zimmerman - may resume ASA tomorrow per GI.    Patient is a 58 Y/O Female with PMhx of HLD, Uterine fibroids, S/P resection, Sarcoma (inactive), S/p resection/RT, SBO presenting with LUQ pain. Patient had colonoscopy on Tuesday, endorsed onset of lower abdominal pain radiating to the back on Wednesday. Also endorses that pain is pleuritic, denies current fevers, nausea, vomiting, decreased p.o. intake.   Endorses fever x1 day.  Denies cough, dysuria. found ot have pericardial Effusion on CT     Pericardial effusion   - Unknown etiology seen on CT  - TTE with EF of 68%, calcific plaque, small loculated pericardial effusion; discussed with cardio - start ASA once cleared from GI standpoint   - Card eval appreciated; F/u recs  - Serial EKG and CE if symptoms arise  - Thoracic surgery eval consulted; F/u recs-- No intervention at this time      subcapsular splenic hematoma.  - abdominal discomfort  - Surg eval appreciated; no intervention at this time   - GI eval called ; likely postcolonoscopy trauma   - Start ASA once cleared from GI standpoint     Sarcoma   on remission   completed course of treatment  surveillance CT yearly      + UCx  - Patient denies symotoms, 10-49K, monitor off of ABX for now unless symptoms arise      HLD   statin   DASH diet     ANxiety/ depresison   home meds    Patient medically clear per Dr. Zimmerman - may resume ASA tomorrow per GI.

## 2023-05-23 NOTE — PLAN OF CARE
Patient's pain managed with Norco. Mostly Swiss speaking. WBAT. Hemovac in place. Tess abdul this morning, due to void. SL, Safety precautions in place.    Problem: Patient Centered Care  Goal: Patient preferences are identified and integrated in the patient's plan of care  Description: Interventions:  - What would you like us to know as we care for you?   - Provide timely, complete, and accurate information to patient/family  - Incorporate patient and family knowledge, values, beliefs, and cultural backgrounds into the planning and delivery of care  - Encourage patient/family to participate in care and decision-making at the level they choose  - Honor patient and family perspectives and choices  Outcome: Progressing     Problem: PAIN - ADULT  Goal: Verbalizes/displays adequate comfort level or patient's stated pain goal  Description: INTERVENTIONS:  - Encourage pt to monitor pain and request assistance  - Assess pain using appropriate pain scale  - Administer analgesics based on type and severity of pain and evaluate response  - Implement non-pharmacological measures as appropriate and evaluate response  - Consider cultural and social influences on pain and pain management  - Manage/alleviate anxiety  - Utilize distraction and/or relaxation techniques  - Monitor for opioid side effects  - Notify MD/LIP if interventions unsuccessful or patient reports new pain  - Anticipate increased pain with activity and pre-medicate as appropriate  Outcome: Progressing     Problem: RISK FOR INFECTION - ADULT  Goal: Absence of fever/infection during anticipated neutropenic period  Description: INTERVENTIONS  - Monitor WBC  - Administer growth factors as ordered  - Implement neutropenic guidelines  Outcome: Progressing     Problem: SAFETY ADULT - FALL  Goal: Free from fall injury  Description: INTERVENTIONS:  - Assess pt frequently for physical needs  - Identify cognitive and physical deficits and behaviors that affect risk of falls.  - [place] fall precautions as indicated by assessment.  - Educate pt/family on patient safety including physical limitations  - Instruct pt to call for assistance with activity based on assessment  - Modify environment to reduce risk of injury  - Provide assistive devices as appropriate  - Consider OT/PT consult to assist with strengthening/mobility  - Encourage toileting schedule  Outcome: Progressing

## 2023-05-23 NOTE — PLAN OF CARE
Patient is A&OX4, RA, Patient worked with PT is up with one and rolling walker. Patient voiding freely. Sitting in chair with daughter at bedside. Patient daughters were saying patient seemed more confused so held norco and gave tylenol for pain instead. Patient is Indian speaking only. SCDs bilat. Bed in lowest position and call light within reach. Plan is to go to rehab now waiting for choices, daughters and patient agreeable to rehab first before home since patient lives alone and has stairs.    Problem: Patient Centered Care  Goal: Patient preferences are identified and integrated in the patient's plan of care  Description: Interventions:  - What would you like us to know as we care for you?   - Provide timely, complete, and accurate information to patient/family  - Incorporate patient and family knowledge, values, beliefs, and cultural backgrounds into the planning and delivery of care  - Encourage patient/family to participate in care and decision-making at the level they choose  - Honor patient and family perspectives and choices  Outcome: Progressing

## 2023-05-24 NOTE — PLAN OF CARE
Patient is A&OX4, RA, Patient worked with PT is up with one and rolling walker. Patient voiding freely. Sitting in chair with daughter at bedside. Added tramadol for pain instead of norco.  Waiting to do u/a when patient voids again. Patient is Divehi speaking only. SCDs bilat. Bed in lowest position and call light within reach. Plan is to go to rehab, choice made, need one more night in hospital for insurance and approvals.   Problem: Patient Centered Care  Goal: Patient preferences are identified and integrated in the patient's plan of care  Description: Interventions:  - What would you like us to know as we care for you?   - Provide timely, complete, and accurate information to patient/family  - Incorporate patient and family knowledge, values, beliefs, and cultural backgrounds into the planning and delivery of care  - Encourage patient/family to participate in care and decision-making at the level they choose  - Honor patient and family perspectives and choices  Outcome: Progressing

## 2023-05-24 NOTE — CM/SW NOTE
Pt progress discussed during discharge rounds. RADHA also spoke with Hospitalist regarding discharge planning and IZABELA recommendation. Pt not cleared for discharge today. RADHA spoke with daughter, Ronel Smith, over the phone regarding IZABELA and the 3 inpatient midnights. Pt will stay in hospital tonight and will have the qualifying stay for IZ[facility]ELA starting on Thursday. Daughter appreciative of update. Daughter agreeable to [facility] at discharge. Reserved in [address] [place]. RADHA also completed PASRR assessment, however, currently queued for review. Will have to upload to [facility] once available. RADHA notified RN of update as well.  &  to remain available and supportive for discharge planning needs. Puja Booker, MATT, LSW  Discharge Planner      Addendum 11:20am: PASRR assessment completed and uploaded in [address] [place].

## 2023-05-24 NOTE — PLAN OF CARE
A/O X3, forgetful at times. Nepali speaking. Cont x2. Up with rolling walker. Norco given for pain. Pt resting on bed comfortably. Call light within reach. Plan is Rehab-pending choice. Plan of care ongoing. Problem: Patient Centered Care  Goal: Patient preferences are identified and integrated in the patient's plan of care  Description: Interventions:  - What would you like us to know as we care for you?  FROM HOME ALONE   - Provide timely, complete, and accurate information to patient/family  - Incorporate patient and family knowledge, values, beliefs, and cultural backgrounds into the planning and delivery of care  - Encourage patient/family to participate in care and decision-making at the level they choose  - Honor patient and family perspectives and choices  Outcome: Progressing     Problem: PAIN - ADULT  Goal: Verbalizes/displays adequate comfort level or patient's stated pain goal  Description: INTERVENTIONS:  - Encourage pt to monitor pain and request assistance  - Assess pain using appropriate pain scale  - Administer analgesics based on type and severity of pain and evaluate response  - Implement non-pharmacological measures as appropriate and evaluate response  - Consider cultural and social influences on pain and pain management  - Manage/alleviate anxiety  - Utilize distraction and/or relaxation techniques  - Monitor for opioid side effects  - Notify MD/LIP if interventions unsuccessful or patient reports new pain  - Anticipate increased pain with activity and pre-medicate as appropriate  Outcome: Progressing     Problem: RISK FOR INFECTION - ADULT  Goal: Absence of fever/infection during anticipated neutropenic period  Description: INTERVENTIONS  - Monitor WBC  - Administer growth factors as ordered  - Implement neutropenic guidelines  Outcome: Progressing     Problem: SAFETY ADULT - FALL  Goal: Free from fall injury  Description: INTERVENTIONS:  - Assess pt frequently for physical needs  - Identify cognitive and physical deficits and behaviors that affect risk of falls.   - [place] fall precautions as indicated by assessment.  - Educate pt/family on patient safety including physical limitations  - Instruct pt to call for assistance with activity based on assessment  - Modify environment to reduce risk of injury  - Provide assistive devices as appropriate  - Consider OT/PT consult to assist with strengthening/mobility  - Encourage toileting schedule  Outcome: Progressing

## 2023-05-25 NOTE — CM/SW NOTE
SW confirmed with RN that pt is medically ready for discharge today. RADHA discussed with Terrence Morillo to arrange a time for discharge. RN is aware of discharge time and location and will inform patient/ family. RN to attach IP Transfer Report to After Visit Summary packet for transfer to [facility]. RADHA confirmed PASR screen was completed. RADHA ordered transportation of a [address] through [employer]. [address] will transport the pt at 2:40 PM to [facility] form/ flow sheet completed. RN to attach and print out with After Visit Summary Packet. [facility] [phone]     SW/CM to remain available for support and/or discharge planning. PLAN: DC to [facility] via [address] at 2:40 PM    RN to call report to [facility] at [phone]. Kaz Joya, KAY, MSW ext.  [phone]

## 2023-05-25 NOTE — PLAN OF CARE
POD #3. Pt alert & oriented x4. Predominantly Kyrgyz speaking. No acute changes noted at this time. Pain managed with tramadol, norco, oxy ER and gel ice wrap. SCDs and ASA for DVT prophylaxis. Up one with a walker. Tolerating diet. Voiding freely. Plan for discharge to [facility]. Fall precautions maintained. Call light within reach. Bed locked, in lowest position, I-bed active. Problem: Patient Centered Care  Goal: Patient preferences are identified and integrated in the patient's plan of care  Description: Interventions:  - What would you like us to know as we care for you?  Hard of hearing, has one hearing aid  - Provide timely, complete, and accurate information to patient/family  - Incorporate patient and family knowledge, values, beliefs, and cultural backgrounds into the planning and delivery of care  - Encourage patient/family to participate in care and decision-making at the level they choose  - Honor patient and family perspectives and choices  Outcome: Progressing     Problem: PAIN - ADULT  Goal: Verbalizes/displays adequate comfort level or patient's stated pain goal  Description: INTERVENTIONS:  - Encourage pt to monitor pain and request assistance  - Assess pain using appropriate pain scale  - Administer analgesics based on type and severity of pain and evaluate response  - Implement non-pharmacological measures as appropriate and evaluate response  - Consider cultural and social influences on pain and pain management  - Manage/alleviate anxiety  - Utilize distraction and/or relaxation techniques  - Monitor for opioid side effects  - Notify MD/LIP if interventions unsuccessful or patient reports new pain  - Anticipate increased pain with activity and pre-medicate as appropriate  Outcome: Progressing     Problem: RISK FOR INFECTION - ADULT  Goal: Absence of fever/infection during anticipated neutropenic period  Description: INTERVENTIONS  - Monitor WBC  - Administer growth factors as ordered  - Implement neutropenic guidelines  Outcome: Progressing     Problem: SAFETY ADULT - FALL  Goal: Free from fall injury  Description: INTERVENTIONS:  - Assess pt frequently for physical needs  - Identify cognitive and physical deficits and behaviors that affect risk of falls.   - [place] fall precautions as indicated by assessment.  - Educate pt/family on patient safety including physical limitations  - Instruct pt to call for assistance with activity based on assessment  - Modify environment to reduce risk of injury  - Provide assistive devices as appropriate  - Consider OT/PT consult to assist with strengthening/mobility  - Encourage toileting schedule  Outcome: Progressing

## 2023-05-25 NOTE — PLAN OF CARE
Patient is A&OX4, RA, Patient worked with PT is up stand and pivot to chair with two assist.  Sitting in chair with daughter at bedside. Patient is Zambian speaking only. SCDs bilat. Patient had emesis right after eating. Gave zofran. Bed in lowest position and call light within reach. Patient has clearances from surgical. Medical and PT/OT to discharge today to rehab,  this nurse called in report to incoming nurse at [facility] rehab. Discharge went over with patient and family and paperwork given to transport. Tramadol and Asprin scripts given to transport with paperwork.   Problem: Patient Centered Care  Goal: Patient preferences are identified and integrated in the patient's plan of care  Description: Interventions:  - What would you like us to know as we care for you?   - Provide timely, complete, and accurate information to patient/family  - Incorporate patient and family knowledge, values, beliefs, and cultural backgrounds into the planning and delivery of care  - Encourage patient/family to participate in care and decision-making at the level they choose  - Honor patient and family perspectives and choices  Outcome: Adequate for Discharge

## 2023-05-29 NOTE — PROGRESS NOTES
pt seen 5/26/23 at [facility]    seen in room, no distress     hospital notes noted   vitals stable       HPI: as per hospital notes \"  The patient is a 67-year-old female who presented to [facility] after she developed progressive ongoing pain over the medial aspect of her knee. The patient had right knee arthroscopic debridement and partial medial meniscectomy about five months prior to her hospitalization. She was seen by her ortho service due to significant pain. She was evaluated by Dr. Stephanie Fermin from ortho service and she was taken to the OR and underwent right total knee arthroplasty with patient specific instrumentation and preoperative computer navigation. Postoperatively, the patient was admitted to the ortho floor with Hemovac drain. \" She started in physical therapy and occupational therapy and transferred to [facility] for subacute rehab. PMHx: Rheumatoid arthritis, lymphoma, dementia, depression, hypothyroidism, fibromyalgia, hearing difficulty, hypertension, dyslipidemia, osteoporosis, TIA, history of right frontal craniotomy for tumor resection, cholecystectomy. Allergies: Dust mite; Latex, Natural Rubber; seasonal    FHx: Reviewed, Noncontributory for liver, kidney, or lung disease. SHx: The patient does not smoke and does not drink alcohol. She lives with in a house with two steps to get in and 12 to the upper level with her daughter.     subj:  + rt knee pain but tolerated  no cp no sob   no abd pain    vit stable     obj  cv s1 s2  chest clear  and soft non tender  ext ; rt knee wound loks ok , pain is tolerated some swelling post op  nicholas: aa o x 3         A/P    osteoarthritis  - status post right total knee arthroplasty  pt ot      Postoperative anemia  will monitor cbc    Osteoporosis   alendronate, vit d supplement's      Fibromyalgia/ Rheumatoid arthritis  -o naproxen, tramadol, voltaren gel, lyrica      Hypertension/   lisinopril     Dyslipidemia    Depression  -on cymbalta  -mood stable  -cpm    CNS lymphoma  -Hx of craniotomy

## 2023-06-07 NOTE — TELEPHONE ENCOUNTER
Refill Request    Medication request: SERTRALINE 100 MG Oral Tab. TAKE 1 TABLET(100 MG) BY MOUTH EVERY NIGHT.      Kelsie Giron MD   Due back to clinic per last office note: RTC post injection f/u  NOV: Visit date not found      [place] ref[place]: 04/06/2023 #30    Urine drug screen (if applicable): n/a  Pain contract: n/a    LOV plan (if weaning or changing medications): No mention of Sertraline in LOV visit or previous OV note

## 2023-06-11 NOTE — PROGRESS NOTES
pt seen 6/5/ 23 at [facility]      doing ok    Continue with pt    will be dc'd this week, to follow with pcp and ortho     vit stable    subj:  + rt knee pain but tolerated  no cp no sob  no abd pain    vit stable    obj  cv s1 s2  chest clear  and soft non tender  ext ; rt knee wound loks ok , pain is tolerated some swelling post op  nicholas: aa o x 3        A/P    osteoarthritis  - status post right total knee arthroplasty  pt ot      Postoperative anemia  will monitor cbc    Osteoporosis  alendronate, vit d supplement's      Fibromyalgia/ Rheumatoid arthritis  -o naproxen, tramadol, voltaren gel, lyrica      Hypertension/  lisinopril    Dyslipidemia    Depression  -on cymbalta  -mood stable  -cpm    CNS lymphoma  -Hx of craniotomy

## 2023-06-12 NOTE — TELEPHONE ENCOUNTER
Left detailed message for Linda that orders will be signed by Karen Duckworth as Dr. Edgar Shaikh is out of the office.

## 2023-06-29 NOTE — PROGRESS NOTES
Dx:   Orthopedic aftercare (Z47.89) - S/P R TKA       Authorized # of Visits:  12 per POC (Medicare/Medicaid)         Next MD visit: none scheduled  Fall Risk: standard         Precautions: dementia, cancer (lymphoma head), depression, thyroid disorder, hypertension, hyperlipidemia, osteoporosis, TIA, craniotomy, hysterectomy, R knee scope, 3 right hand surgeries, thoracic stenosis, lumbar stenosis, lumbar disc herniation, lumbosacral DDD, chronic pain of both shoulders, RA.       Date of evaluation: 6/27/23  Date of surgery: 5/22/23  Referring physician: Antonia Rawls PA-C/Dr. Katharine Borges        Medication Changes since last visit?: No  Subjective: ***    Objective:   See flow chart below      Assessment: ***    Goals (to be achieved in 12 visits):    Pt will improve knee extension ROM to 0 deg to allow proper heel strike during gait and terminal knee extension in stance  Pt will improve knee AROM flexion to >120 degrees to improve ability to perform lower body dressing  Pt will improve hip strength to at least 4+/5 and knee strength to 5/5 to ascend 1 flight of stairs reciprocally without UE assist   Pt will improve SLS to >10s to improve safety and independence with gait on uneven surfaces such as grass   Patient will demo normalized gait pattern with least restrictive AD to be able to walk for 20 minutes  Pt will be independent and compliant with comprehensive HEP to maintain progress achieved in PT    Plan: ***    Visit #2  Date: 6/29/23 Visit #3  Date:  Visit #4  Date:                 HEP:   HEP: HEP:         Charges: ***       Total Timed Treatment: *** min  Total Treatment Time: *** min

## 2023-07-05 NOTE — PROGRESS NOTES
NURSING INTAKE COMMENTS: Patient presents with:  Post-Op: 5/29, RT TKA. On and off pain at night and slight numbness. Icing and taking diclofenac and ibuprofen PRN. Pain scale 5/10. HPI: This 69year old female presents today with complaints of Right knee surgery follow-up. She is 6 weeks postoperative from a total knee arthroplasty. She feels some warmth and discomfort in the knee at night. She has some difficulty sleeping due to pain and inflammation. No fevers or chills. No calf pain. 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      Past Surgical History:   Procedure Laterality Date    BRAIN SURGERY  10/31/2016    Right frontal craniotomy for tumor resection    CHOLECYSTECTOMY  2012    COLONOSCOPY      at [facility] about 8 years ago    COLONOSCOPY N/A 2/15/2023    Procedure: COLONOSCOPY;  Surgeon: Nataly Beth MD;  Location: [address] [place] [address] ENDOSCOPY    HYSTERECTOMY      KNEE SURGERY Right 10/2022    OTHER SURGICAL HISTORY      Bionic Eye    OTHER SURGICAL HISTORY      lymphoma biopsy    OTHER SURGICAL HISTORY      3 RT hand surgeries for broken fingers     TUBAL LIGATION       Current Outpatient Medications   Medication Sig Dispense Refill    ZINC OR Use as directed in the mouth or throat. DICLOFENAC OR Take 75 mg by mouth in the morning and 75 mg before bedtime. celecoxib 200 MG Oral Cap Take 1 capsule (200 mg total) by mouth daily.  30 capsule 0    SERTRALINE 100 MG Oral Tab TAKE 1 TABLET(100 MG) BY MOUTH EVERY NIGHT 90 tablet 0    donepezil 10 MG Oral Tab Take 1 tablet (10 mg total) by mouth every evening. 90 tablet 3    LISINOPRIL 20 MG Oral Tab TAKE 1 TABLET BY MOUTH DAILY 90 tablet 1    PREGABALIN 100 MG Oral Cap TAKE 1 CAPSULE(100 MG) BY MOUTH TWICE DAILY 180 capsule 1    diclofenac 0.1 % Ophthalmic Solution INSTILL 1 DROP INTO IN AFFECTED EYE(S) AS DIRECTED 5 mL 0    Cinacalcet HCl 30 MG Oral Tab Take 1/2 tablet daily      Multiple Vitamins-Minerals (ONE-A-DAY WOMENS 50+ ADVANTAGE) Oral Tab Take 1 tablet by mouth daily. acetaminophen (TYLENOL) 325 MG Oral Tab Take 500 mg by mouth every 6 (six) hours as needed for Pain. alendronate 70 MG Oral Tab Take 1 tablet (70 mg total) by mouth every 7 days.  (Patient not taking: Reported on 7/5/2023) 12 tablet 3       Dust Mite Extract       Runny nose  Latex                   RASH    Comment:itching  Latex                   ITCHING    Comment:If exposed for long time  Seasonal                UNKNOWN  Family History   Problem Relation Age of Onset    Arthritis Father         Rheumatoid    Cancer Mother         uterine cancer    Other (Other) Maternal Grandmother     Other (Other) Maternal Grandfather     Other (Other) Paternal Grandmother     Other (Other) Paternal Grandfather     Cancer Cousin         breast cancer       Social History    Occupational History      Not on file    Tobacco Use      Smoking status: Never      Smokeless tobacco: Never    Vaping Use      Vaping Use: Never used    Substance and Sexual Activity      Alcohol use: Not Currently        Alcohol/week: 0.0 standard drinks of alcohol      Drug use: No      Sexual activity: Not on file       Review of Systems:  GENERAL: denies fevers, chills, night sweats, fatigue, unintentional weight loss/gain  SKIN: denies skin lesions, open sores, rash  HEENT:denies recent vision change, new nasal congestion,hearing loss, tinnitus, sore throat, headaches  RESPIRATORY: denies new shortness of breath, cough, asthma, wheezing  CARDIOVASCULAR: denies chest pain, leg cramps with exertion, palpitations, leg swelling  GI: denies abdominal pain, nausea, vomiting, diarrhea, constipation, hematochezia, worsening heartburn or stomach ulcers  : denies dysuria, hematuria, incontinence, increased frequency, urgency, difficulty urinating  MUSCULOSKELETAL: denies musculoskeletal complaints other than in HPI  NEURO: denies numbness, tingling, weakness, balance issues, dizziness, memory loss  PSYCHIATRIC: denies Hx of depression, anxiety, other psychiatric disorders  HEMATOLOGIC: denies blood clots, anemia, blood clotting disorders, blood transfusion  ENDOCRINE: denies autoimmune disease, thyroid issues, or diabetes  ALLERGY: denies asthma, seasonal allergies    Physical Examination:    There were no vitals taken for this visit. Constitutional: appears well hydrated, alert and responsive, no acute distress noted  Extremities: Right knee incision well-healed. Range of motion 5 to 120 degrees. Good varus valgus stability in mid flexion. No calf tenderness or significant swelling. Neurological: Unchanged    Imaging:   XR KNEE (3 VIEWS), RIGHT (POO=59072)    Result Date: 6/8/2023  PROCEDURE: XR KNEE ROUTINE (3 VIEWS), RIGHT (UII=74768)  COMPARISON: [facility], XR KNEE (1 OR 2 VIEWS), RIGHT (CPT=73560), 5/22/2023, 12:26 PM.  INDICATIONS: Orthopedic aftercare, right TKA 5/29/23. TECHNIQUE: 3 views were obtained with weightbearing technique. FINDINGS:  BONES: Knee arthroplasty has been placed, with gross anatomic alignment of the hardware. No evidence for hardware loosening or osseous fracture along the margin of the prosthesis. No acute osseous fracture or suspicious bone lesion elsewhere in the field of imaging. SOFT TISSUES: Mild inflammation ventral to knee and in Hoffa's fat pad, otherwise improved since previous exam.  Interval removal of surgical drain from the knee joint. Stable appearance of skin staples ventral to the right knee.  EFFUSION: Small knee effusion is present. OTHER: Negative. CONCLUSION: Post right knee arthroplasty without complication. No acute osseous abnormality. Interval removal of surgical drain. Interval improvement of soft tissue inflammation and emphysema. Residual small knee effusion and residual inflammation is  present. Dictated by (CST): Connor Huang MD on 6/08/2023 at 11:23 AM     Finalized by (CST): Connor Huang MD on 6/08/2023 at 11:29 AM             Labs:  Lab Results   Component Value Date    WBC 4.5 05/25/2023    HGB 8.5 (L) 05/25/2023    .0 (L) 05/25/2023      Lab Results   Component Value Date    GLU 94 05/25/2023    BUN 19 (H) 05/25/2023    CREATSERUM 0.71 05/25/2023    GFR >60 04/13/2015    GFRNAA >60 11/27/2016    GFRAA >60 11/27/2016        Assessment and Plan:  Diagnoses and all orders for this visit:    Post-traumatic osteoarthritis of right knee  -     PHYSICAL THERAPY - INTERNAL    Orthopedic aftercare  -     XR KNEE (3 VIEWS), RIGHT (CPT=73562); Future    Other orders  -     celecoxib 200 MG Oral Cap; Take 1 capsule (200 mg total) by mouth daily. Assessment: Healing right knee after total knee arthroplasty    Plan: I advised outpatient physical therapy focused on range of motion and strength training. Suggested scar massage and application of topical Voltaren gel to the knee. Avoid kneeling. May resume stairs. She requested a prescription for tramadol. I reviewed her medication she has an interaction with her antidepressant. I advised Celebrex in place of the tramadol for now. Follow-up with me in 6 weeks. Follow Up: Return in about 6 weeks (around 8/16/2023).     Yazmin Harp MD

## 2023-07-06 NOTE — PROGRESS NOTES
Dx:   Orthopedic aftercare (Z47.89) - S/P R TKA       Authorized # of Visits:  12 per POC (Medicare/Medicaid)           Next MD visit: none scheduled  Fall Risk: standard         Precautions: dementia, cancer (lymphoma head), depression, thyroid disorder, hypertension, hyperlipidemia, osteoporosis, TIA, craniotomy, hysterectomy, R knee scope, 3 right hand surgeries, thoracic stenosis, lumbar stenosis, lumbar disc herniation, lumbosacral DDD, chronic pain of both shoulders, RA. Date of evaluation: 6/27/23  Date of surgery: 5/22/23  Referring physician: Juan Cheema PA-C/Dr. Nathanael Gong        Medication Changes since last visit?: No  Subjective: Patient reports she is doing HEP. Saw MD yesterday who was pleased with her progress. Night pain is getting better. Denies any pain currently. Objective:   *Patient arrives 20 min late for appointment    See flow chart below  7/3/23: R knee AROM: 0-114 deg      Assessment: Session limited due to patient arriving late. Continued with R knee stretching and progressed strengthening with good tolerance.      Goals (to be achieved in 12 visits):    Pt will improve knee extension ROM to 0 deg to allow proper heel strike during gait and terminal knee extension in stance  Pt will improve knee AROM flexion to >120 degrees to improve ability to perform lower body dressing  Pt will improve hip strength to at least 4+/5 and knee strength to 5/5 to ascend 1 flight of stairs reciprocally without UE assist   Pt will improve SLS to >10s to improve safety and independence with gait on uneven surfaces such as grass   Patient will demo normalized gait pattern with least restrictive AD to be able to walk for 20 minutes  Pt will be independent and compliant with comprehensive HEP to maintain progress achieved in PT    Plan: Continue PT for R knee ROM, RLE strengthening, gait and balance training    Visit #2  Date: 7/3/23 Visit #3  Date: 7/6/23 Visit #4  Date:   TherEx (40 min):  NU step L4 x 5 min UEs/LEs  Heel slides 20x  Heel slides with therapist OP 10\"x5  SAQ over bolster 20x5\" with 2#  SLR with quad set 2x10  LAQ 20x5\" with 2#  Shuttle DL press 2x10 with 6 cords  Shuttle SL press 2x10 with 3 cords  6\" fwd step up 2x10 with 1 hand rail   6\" lat step up 2x10 with rail  Standing heel raise 20x  Lateral walk at barre x 5 laps with YTB   HS stretch at stair 3x30\"      TherEx:  NU step L4 x 5 min UE/LE  Heel slides with therapist OP 10\"x5  SAQ over bolster 20x5\" with 3#  Bridge with ball squeeze 20x5\"   6\" fwd step up 2x10 with 1 UE  3\" fwd step down 2x10 with 1 UE  Mini squat 20x  Heel raise 20x             HEP:  Reviewed and reinforced, issued handout for SLR, heel slide, LAQ, standing heel raise and HS Stretch at stair HEP:  Reviewed  HEP:       Charges: TE x 2       Total Timed Treatment: 23 min  Total Treatment Time: 23 min

## 2023-07-11 NOTE — PROGRESS NOTES
Dx:   Orthopedic aftercare (Z47.89) - S/P R TKA       Authorized # of Visits:  12 per POC (Medicare/Medicaid)           Next MD visit: none scheduled  Fall Risk: standard         Precautions: dementia, cancer (lymphoma head), depression, thyroid disorder, hypertension, hyperlipidemia, osteoporosis, TIA, craniotomy, hysterectomy, R knee scope, 3 right hand surgeries, thoracic stenosis, lumbar stenosis, lumbar disc herniation, lumbosacral DDD, chronic pain of both shoulders, RA. Date of evaluation: 6/27/23  Date of surgery: 5/22/23  Referring physician: Brijesh Morton PA-C/Dr. Merlene Keith        Medication Changes since last visit?: No  Subjective: Patient's daughter reports patient has been \"in pain all week\" from the workout during Thursday's PT session, but is just muscle soreness, not actually pain. Does not rate pain this date. Objective:     See flow chart below  7/11/23: R knee AROM: 0-118 deg  7/3/23: R knee AROM: 0-114 deg      Assessment: R knee ROM improved since last measurement. Continued with step training at 6\" and 3\" heights, as these remain difficult for patient at this time.      Goals (to be achieved in 12 visits):    Pt will improve knee extension ROM to 0 deg to allow proper heel strike during gait and terminal knee extension in stance  Pt will improve knee AROM flexion to >120 degrees to improve ability to perform lower body dressing  Pt will improve hip strength to at least 4+/5 and knee strength to 5/5 to ascend 1 flight of stairs reciprocally without UE assist   Pt will improve SLS to >10s to improve safety and independence with gait on uneven surfaces such as grass   Patient will demo normalized gait pattern with least restrictive AD to be able to walk for 20 minutes  Pt will be independent and compliant with comprehensive HEP to maintain progress achieved in PT    Plan: Continue PT for R knee ROM, RLE strengthening, gait and balance training    Visit #2  Date: 7/3/23 Visit #3  Date: 7/6/23 Visit #4  Date: 7/11/23   TherEx (40 min):  NU step L4 x 5 min UEs/LEs  Heel slides 20x  Heel slides with therapist OP 10\"x5  SAQ over bolster 20x5\" with 2#  SLR with quad set 2x10  LAQ 20x5\" with 2#  Shuttle DL press 2x10 with 6 cords  Shuttle SL press 2x10 with 3 cords  6\" fwd step up 2x10 with 1 hand rail   6\" lat step up 2x10 with rail  Standing heel raise 20x  Lateral walk at barre x 5 laps with YTB   HS stretch at stair 3x30\"      TherEx:  NU step L4 x 5 min UEs/LEs  Heel slides with therapist OP 10\"x5  SAQ over bolster 20x5\" with 3#  Bridge with ball squeeze 20x5\"   6\" fwd step up 2x10 with 1 UE  3\" fwd step down 2x10 with 1 UE  Mini squat 20x  Heel raise 20x   TherEx (38 min):  NU step L5 x 5 min UE/LE  Heel slides with therapist OP 10\"x10  SAQ over bolster 20x5\" with 3#  SLR with quad set 2x10  6\" fwd step up 2x10 with 1 UE  3\" fwd step down 2x10 with 1 UE  HS stretch at stair 3x30\"   Standing heel raise 20x  Gastroc stretch on slant x 1 min  Shuttle DL press 2x10 with 6 cords  Shuttle SL press 2x10 with 3.5 cords            HEP:  Reviewed and reinforced, issued handout for SLR, heel slide, LAQ, standing heel raise and HS Stretch at stair HEP:  Reviewed  HEP:  Reviewed and reinforced       Charges: TE x 3       Total Timed Treatment: 38 min  Total Treatment Time: 38 min

## 2023-07-18 NOTE — PROGRESS NOTES
Dx:   Orthopedic aftercare (Z47.89) - S/P R TKA       Authorized # of Visits:  12 per POC (Medicare/Medicaid)           Next MD visit: none scheduled  Fall Risk: standard         Precautions: dementia, cancer (lymphoma head), depression, thyroid disorder, hypertension, hyperlipidemia, osteoporosis, TIA, craniotomy, hysterectomy, R knee scope, 3 right hand surgeries, thoracic stenosis, lumbar stenosis, lumbar disc herniation, lumbosacral DDD, chronic pain of both shoulders, RA. Date of evaluation: 6/27/23  Date of surgery: 5/22/23  Referring physician: Chidi Cronin PA-C/Dr. Kalyani Escobar        Medication Changes since last visit?: No  Subjective: Patient reports she has a little pain in her knee today, but better. Rates pain 2/10 currently. Objective:     See flow chart below  7/11/23: R knee AROM: 0-118 deg  7/3/23: R knee AROM: 0-114 deg      Assessment: Able to increase step height for step downs with good tolerance. Also increased resistance on Shuttle for progression of strengthening.      Goals (to be achieved in 12 visits):    Pt will improve knee extension ROM to 0 deg to allow proper heel strike during gait and terminal knee extension in stance  Pt will improve knee AROM flexion to >120 degrees to improve ability to perform lower body dressing  Pt will improve hip strength to at least 4+/5 and knee strength to 5/5 to ascend 1 flight of stairs reciprocally without UE assist   Pt will improve SLS to >10s to improve safety and independence with gait on uneven surfaces such as grass   Patient will demo normalized gait pattern with least restrictive AD to be able to walk for 20 minutes  Pt will be independent and compliant with comprehensive HEP to maintain progress achieved in PT    Plan: Continue PT for R knee ROM, RLE strengthening, gait and balance training    Visit #2  Date: 7/3/23 Visit #3  Date: 7/6/23 Visit #4  Date: 7/11/23 Visit #5  Date: 7/18/23   TherEx (40 min):  NU step L4 x 5 min UEs/LEs  Heel slides 20x  Heel slides with therapist OP 10\"x5  SAQ over bolster 20x5\" with 2#  SLR with quad set 2x10  LAQ 20x5\" with 2#  Shuttle DL press 2x10 with 6 cords  Shuttle SL press 2x10 with 3 cords  6\" fwd step up 2x10 with 1 hand rail   6\" lat step up 2x10 with rail  Standing heel raise 20x  Lateral walk at barre x 5 laps with YTB   HS stretch at stair 3x30\"      TherEx:  NU step L4 x 5 min UEs/LEs  Heel slides with therapist OP 10\"x5  SAQ over bolster 20x5\" with 3#  Bridge with ball squeeze 20x5\"   6\" fwd step up 2x10 with 1 UE  3\" fwd step down 2x10 with 1 UE  Mini squat 20x  Heel raise 20x   TherEx (38 min):  NU step L5 x 5 min UE/LE  Heel slides with therapist OP 10\"x10  SAQ over bolster 20x5\" with 3#  SLR with quad set 2x10  6\" fwd step up 2x10 with 1 UE  3\" fwd step down 2x10 with 1 UE  HS stretch at stair 3x30\"   Standing heel raise 20x  Gastroc stretch on slant x 1 min  Shuttle DL press 2x10 with 6 cords  Shuttle SL press 2x10 with 3.5 cords   TherEx (38 min):  NU step L5 x 5 min UEs/LEs  Heel slides with therapist OP 10\"x10  Bridge with ball squeeze 20x5\"   SLR with quad set 2x10  SAQ over bolster 20x5\" with 3#  Shuttle DL press 2x15 with 6.5 cords  Shuttle SL press 2x15 with 4 cords  6\" fwd step up 20x with no UEs  4\" fwd step down 20x with 1 UE  Alternating knee flexion/extension stretch at stair 10\"x10 ea  Tilt board A/P and M/L taps 20x ea             HEP:  Reviewed and reinforced, issued handout for SLR, heel slide, LAQ, standing heel raise and HS Stretch at stair HEP:  Reviewed  HEP:  Reviewed and reinforced        Charges: TE x 3       Total Timed Treatment: 38 min  Total Treatment Time: 38 min

## 2023-07-20 NOTE — PROGRESS NOTES
Dx:   Orthopedic aftercare (Z47.89) - S/P R TKA       Authorized # of Visits:  12 per POC (Medicare/Medicaid)           Next MD visit: none scheduled  Fall Risk: standard         Precautions: dementia, cancer (lymphoma head), depression, thyroid disorder, hypertension, hyperlipidemia, osteoporosis, TIA, craniotomy, hysterectomy, R knee scope, 3 right hand surgeries, thoracic stenosis, lumbar stenosis, lumbar disc herniation, lumbosacral DDD, chronic pain of both shoulders, RA. Date of evaluation: 6/27/23  Date of surgery: 5/22/23  Referring physician: Peng Ron PA-C/Dr. Adenike Barrientos        Medication Changes since last visit?: No  Subjective: Patient reports her knee is feeling good. Very little pain. Rates pain 1/10 currently. Objective:     See flow chart below  7/20/23: R knee AROM: 0-120 deg  7/11/23: R knee AROM: 0-118 deg  7/3/23: R knee AROM: 0-114 deg      Assessment: Patient demos R knee ROM nearing WNL. Progressed proprioceptive challenges to improve R knee stability for gait on uneven surfaces.      Goals (to be achieved in 12 visits):    Pt will improve knee extension ROM to 0 deg to allow proper heel strike during gait and terminal knee extension in stance  Pt will improve knee AROM flexion to >120 degrees to improve ability to perform lower body dressing  Pt will improve hip strength to at least 4+/5 and knee strength to 5/5 to ascend 1 flight of stairs reciprocally without UE assist   Pt will improve SLS to >10s to improve safety and independence with gait on uneven surfaces such as grass   Patient will demo normalized gait pattern with least restrictive AD to be able to walk for 20 minutes  Pt will be independent and compliant with comprehensive HEP to maintain progress achieved in PT    Plan: Continue PT for R knee ROM, RLE strengthening, gait and balance training    Visit #3  Date: 7/6/23 Visit #4  Date: 7/11/23 Visit #5  Date: 7/18/23 Visit #6  Date: 7/20/23   TherEx:  NU step L4 x 5 min UEs/LEs  Heel slides with therapist OP 10\"x5  SAQ over bolster 20x5\" with 3#  Bridge with ball squeeze 20x5\"   6\" fwd step up 2x10 with 1 UE  3\" fwd step down 2x10 with 1 UE  Mini squat 20x  Heel raise 20x   TherEx (38 min):  NU step L5 x 5 min UE/LE  Heel slides with therapist OP 10\"x10  SAQ over bolster 20x5\" with 3#  SLR with quad set 2x10  6\" fwd step up 2x10 with 1 UE  3\" fwd step down 2x10 with 1 UE  HS stretch at stair 3x30\"   Standing heel raise 20x  Gastroc stretch on slant x 1 min  Shuttle DL press 2x10 with 6 cords  Shuttle SL press 2x10 with 3.5 cords   TherEx (38 min):  NU step L5 x 5 min UEs/LEs  Heel slides with therapist OP 10\"x10  Bridge with ball squeeze 20x5\"   SLR with quad set 2x10  SAQ over bolster 20x5\" with 3#  Shuttle DL press 2x15 with 6.5 cords  Shuttle SL press 2x15 with 4 cords  6\" fwd step up 20x with no UEs  4\" fwd step down 20x with 1 UE  Alternating knee flexion/extension stretch at stair 10\"x10 ea  Tilt board A/P and M/L taps 20x ea   TherEx (32 min):  NU step L5 x 5 min UEs/LEs  Heel slides with therapist OP 10\"x10  SAQ over bolster 2x15, 5\" holds with 3#  Seated ham curl 2x15 with GTB   Shuttle DL press 2x15 with 6.5 cords  Shuttle SL press 2x15 with 4 cords  Sit to stand from standard chair with no UEs 15x  HS stretch at stair 3x30\"                 NMR (8 min):  Tilt board A/P and M/L taps 20x ea  Air ex march 20x  BOSU fwd alt lunge 20x with 1 UE   HEP:  Reviewed  HEP:  Reviewed and reinforced         Charges: TE x 2, NMR x 1       Total Timed Treatment: 40 min  Total Treatment Time: 40 min

## 2023-07-25 NOTE — PROGRESS NOTES
Dx:   Orthopedic aftercare (Z47.89) - S/P R TKA       Authorized # of Visits:  12 per POC (Medicare/Medicaid)           Next MD visit: none scheduled  Fall Risk: standard         Precautions: dementia, cancer (lymphoma head), depression, thyroid disorder, hypertension, hyperlipidemia, osteoporosis, TIA, craniotomy, hysterectomy, R knee scope, 3 right hand surgeries, thoracic stenosis, lumbar stenosis, lumbar disc herniation, lumbosacral DDD, chronic pain of both shoulders, RA. Date of evaluation: 6/27/23  Date of surgery: 5/22/23  Referring physician: Ronnell Tinoco PA-C/Dr. Jackelin Cortez        Medication Changes since last visit?: No  Subjective: Patient reports her knee feels \"tired\", but very little pain. Rates pain 1/10 currently. Objective:     See flow chart below  7/20/23: R knee AROM: 0-120 deg  7/11/23: R knee AROM: 0-118 deg  7/3/23: R knee AROM: 0-114 deg      Assessment: Advanced functional strengthening with lateral and monster walks with band, and continued with proprioceptive training with good tolerance, however patient does have difficulty with single leg balance.      Goals (to be achieved in 12 visits):    Pt will improve knee extension ROM to 0 deg to allow proper heel strike during gait and terminal knee extension in stance  Pt will improve knee AROM flexion to >120 degrees to improve ability to perform lower body dressing  Pt will improve hip strength to at least 4+/5 and knee strength to 5/5 to ascend 1 flight of stairs reciprocally without UE assist   Pt will improve SLS to >10s to improve safety and independence with gait on uneven surfaces such as grass   Patient will demo normalized gait pattern with least restrictive AD to be able to walk for 20 minutes  Pt will be independent and compliant with comprehensive HEP to maintain progress achieved in PT    Plan: Continue PT for R knee ROM, RLE strengthening, gait and balance training    Visit #4  Date: 7/11/23 Visit #5  Date: 7/18/23 Visit #6  Date: 7/20/23 Visit #7  Date: 7/25/23   TherEx (38 min):  NU step L5 x 5 min UE/LE  Heel slides with therapist OP 10\"x10  SAQ over bolster 20x5\" with 3#  SLR with quad set 2x10  6\" fwd step up 2x10 with 1 UE  3\" fwd step down 2x10 with 1 UE  HS stretch at stair 3x30\"   Standing heel raise 20x  Gastroc stretch on slant x 1 min  Shuttle DL press 2x10 with 6 cords  Shuttle SL press 2x10 with 3.5 cords   TherEx (38 min):  NU step L5 x 5 min UEs/LEs  Heel slides with therapist OP 10\"x10  Bridge with ball squeeze 20x5\"   SLR with quad set 2x10  SAQ over bolster 20x5\" with 3#  Shuttle DL press 2x15 with 6.5 cords  Shuttle SL press 2x15 with 4 cords  6\" fwd step up 20x with no UEs  4\" fwd step down 20x with 1 UE  Alternating knee flexion/extension stretch at stair 10\"x10 ea  Tilt board A/P and M/L taps 20x ea   TherEx (32 min):  NU step L5 x 5 min UEs/LEs  Heel slides with therapist OP 10\"x10  SAQ over bolster 2x15, 5\" holds with 3#  Seated ham curl 2x15 with GTB   Shuttle DL press 2x15 with 6.5 cords  Shuttle SL press 2x15 with 4 cords  Sit to stand from standard chair with no UEs 15x  HS stretch at stair 3x30\"            TherEx (30 min):  NU step L5 x 5 min UEs/LEs  Heel slides with therapist OP 10\"x10  SLR with quad set 2x15  Lateral walk with YTB 2x30'   Monster walk with YTB 2x30'   6\" fwd step down 15x with 1 hand rail  6\" lat step up 20x no UEs  Standing heel/toe raise 20x  Shuttle DL press 2x15 with 7 cords  Shuttle SL press 2x15 with 4 cords        NMR (8 min):  Tilt board A/P and M/L taps 20x ea  Air ex march 20x  BOSU fwd alt lunge 20x with 1 UE NMR (10 min):   Air ex march 30x  Single leg cone touches with 2 cones 15x R/L   Tilt board A/P and M/L taps 20x ea to balance x 30\"      HEP:  Reviewed and reinforced          Charges: TE x 2, NMR x 1       Total Timed Treatment: 40 min  Total Treatment Time: 40 min

## 2023-07-27 NOTE — PROGRESS NOTES
Dx:   Orthopedic aftercare (Z47.89) - S/P R TKA       Authorized # of Visits:  12 per POC (Medicare/Medicaid)           Next MD visit: none scheduled  Fall Risk: standard         Precautions: dementia, cancer (lymphoma head), depression, thyroid disorder, hypertension, hyperlipidemia, osteoporosis, TIA, craniotomy, hysterectomy, R knee scope, 3 right hand surgeries, thoracic stenosis, lumbar stenosis, lumbar disc herniation, lumbosacral DDD, chronic pain of both shoulders, RA. Date of evaluation: 6/27/23  Date of surgery: 5/22/23  Referring physician: Nola Johnson PA-C/Dr. Jeanette Wilks        Medication Changes since last visit?: No  Subjective: Patient reports she is doing well today. Has pain when she bends her knee but denies any pain currently. Objective:     See flow chart below  7/20/23: R knee AROM: 0-120 deg  7/11/23: R knee AROM: 0-118 deg  7/3/23: R knee AROM: 0-114 deg      Assessment: Improved stability with single leg balance this date. Patient does continue to have some pain and stiffness with end range flexion.      Goals (to be achieved in 12 visits):    Pt will improve knee extension ROM to 0 deg to allow proper heel strike during gait and terminal knee extension in stance  Pt will improve knee AROM flexion to >120 degrees to improve ability to perform lower body dressing  Pt will improve hip strength to at least 4+/5 and knee strength to 5/5 to ascend 1 flight of stairs reciprocally without UE assist   Pt will improve SLS to >10s to improve safety and independence with gait on uneven surfaces such as grass   Patient will demo normalized gait pattern with least restrictive AD to be able to walk for 20 minutes  Pt will be independent and compliant with comprehensive HEP to maintain progress achieved in PT    Plan: Continue PT for R knee ROM, RLE strengthening, gait and balance training    Visit #5  Date: 7/18/23 Visit #6  Date: 7/20/23 Visit #7  Date: 7/25/23 Visit #8  Date: 7/27/23   Gumex (38 min):  NU step L5 x 5 min UEs/LEs  Heel slides with therapist OP 10\"x10  Bridge with ball squeeze 20x5\"   SLR with quad set 2x10  SAQ over bolster 20x5\" with 3#  Shuttle DL press 2x15 with 6.5 cords  Shuttle SL press 2x15 with 4 cords  6\" fwd step up 20x with no UEs  4\" fwd step down 20x with 1 UE  Alternating knee flexion/extension stretch at stair 10\"x10 ea  Tilt board A/P and M/L taps 20x ea   TherEx (32 min):  NU step L5 x 5 min UEs/LEs  Heel slides with therapist OP 10\"x10  SAQ over bolster 2x15, 5\" holds with 3#  Seated ham curl 2x15 with GTB   Shuttle DL press 2x15 with 6.5 cords  Shuttle SL press 2x15 with 4 cords  Sit to stand from standard chair with no UEs 15x  HS stretch at stair 3x30\"            TherEx (30 min):  NU step L5 x 5 min UEs/LEs  Heel slides with therapist OP 10\"x10  SLR with quad set 2x15  Lateral walk with YTB 2x30'   Monster walk with YTB 2x30'   6\" fwd step down 15x with 1 hand rail  6\" lat step up 20x no UEs  Standing heel/toe raise 20x  Shuttle DL press 2x15 with 7 cords  Shuttle SL press 2x15 with 4 cords    TherEx (30 min):  NU step L5 x 5 min UEs/LEs  Heel slides with therapist OP 10\"x10  SLR with quad set 2x15  Bridge with ball squeeze 2x15, 5\" holds   Shuttle DL press 2x15 with 7 cords  Shuttle SL press 2x15 with 4 cords  6\" fwd step up 20x no UEs  6\" fwd step down 20x with 1 UE  HS stretch at stair 3x30\"       Manual:  Patellar mobs x 3 min    NMR (8 min):  Tilt board A/P and M/L taps 20x ea  Air ex march 20x  BOSU fwd alt lunge 20x with 1 UE NMR (10 min):   Air ex march 30x  Single leg cone touches with 2 cones 15x R/L   Tilt board A/P and M/L taps 20x ea to balance x 30\"    NMR (8 min):  Single leg cone touches with 2 cones 15x R/L   Tilt board A/P and M/L taps 20x ea to balance x 30\"              Charges: TE x 2, NMR x 1       Total Timed Treatment: 41 min  Total Treatment Time: 41 min

## 2023-08-01 NOTE — PROGRESS NOTES
Dx:   Orthopedic aftercare (Z47.89) - S/P R TKA       Authorized # of Visits:  12 per POC (Medicare/Medicaid)           Next MD visit: none scheduled  Fall Risk: standard         Precautions: dementia, cancer (lymphoma head), depression, thyroid disorder, hypertension, hyperlipidemia, osteoporosis, TIA, craniotomy, hysterectomy, R knee scope, 3 right hand surgeries, thoracic stenosis, lumbar stenosis, lumbar disc herniation, lumbosacral DDD, chronic pain of both shoulders, RA. Date of evaluation: 6/27/23  Date of surgery: 5/22/23  Referring physician: Anahi Gates PA-C/Dr. William Pike        Medication Changes since last visit?: No  Subjective: Patient reports she forgot her cane today. Does not really use it much anymore. Denies any pain currently. Objective:     See flow chart below  7/20/23: R knee AROM: 0-120 deg  7/11/23: R knee AROM: 0-118 deg  7/3/23: R knee AROM: 0-114 deg      Assessment: Able to increase step height for step ups this visit without compensation noted. Goals (to be achieved in 12 visits):    Pt will improve knee extension ROM to 0 deg to allow proper heel strike during gait and terminal knee extension in stance  Pt will improve knee AROM flexion to >120 degrees to improve ability to perform lower body dressing  Pt will improve hip strength to at least 4+/5 and knee strength to 5/5 to ascend 1 flight of stairs reciprocally without UE assist   Pt will improve SLS to >10s to improve safety and independence with gait on uneven surfaces such as grass   Patient will demo normalized gait pattern with least restrictive AD to be able to walk for 20 minutes  Pt will be independent and compliant with comprehensive HEP to maintain progress achieved in PT    Plan: Continue PT for R knee ROM, RLE strengthening, gait and balance training. Anticipate discharge after 1 more visit.      Visit #6  Date: 7/20/23 Visit #7  Date: 7/25/23 Visit #8  Date: 7/27/23 Visit #9  Date: 8/1/23   TherEx (32 min):  NU step L5 x 5 min UEs/LEs  Heel slides with therapist OP 10\"x10  SAQ over bolster 2x15, 5\" holds with 3#  Seated ham curl 2x15 with GTB   Shuttle DL press 2x15 with 6.5 cords  Shuttle SL press 2x15 with 4 cords  Sit to stand from standard chair with no UEs 15x  HS stretch at stair 3x30\"            TherEx (30 min):  NU step L5 x 5 min UEs/LEs  Heel slides with therapist OP 10\"x10  SLR with quad set 2x15  Lateral walk with YTB 2x30'   Monster walk with YTB 2x30'   6\" fwd step down 15x with 1 hand rail  6\" lat step up 20x no UEs  Standing heel/toe raise 20x  Shuttle DL press 2x15 with 7 cords  Shuttle SL press 2x15 with 4 cords    TherEx (30 min):  NU step L5 x 5 min UEs/LEs  Heel slides with therapist OP 10\"x10  SLR with quad set 2x15  Bridge with ball squeeze 2x15, 5\" holds   Shuttle DL press 2x15 with 7 cords  Shuttle SL press 2x15 with 4 cords  6\" fwd step up 20x no UEs  6\" fwd step down 20x with 1 UE  HS stretch at stair 3x30\"       Manual:  Patellar mobs x 3 min TherEx (32 min):  NU step L5 x 5 min UEs/LEs  Heel slides with therapist OP 10\"x10  S/L clam 20x5\" R/L with YTB  LAQ 2x15, 5\" holds with 3#  8\" fwd step up 20x with 1 UE  8\" lateral step up 20x with rail  Shuttle DL press 2x15 with 7 cords  Shuttle SL press 2x15 with 4.5 cords  Lateral walk with YTB 2x30'   Monster walk with YTB 2x30'    NMR (8 min):  Tilt board A/P and M/L taps 20x ea  Air ex march 20x  BOSU fwd alt lunge 20x with 1 UE NMR (10 min):   Air ex march 30x  Single leg cone touches with 2 cones 15x R/L   Tilt board A/P and M/L taps 20x ea to balance x 30\"    NMR (8 min):  Single leg cone touches with 2 cones 15x R/L   Tilt board A/P and M/L taps 20x ea to balance x 30\"  NMR (8 min):  SLS on air ex 2x30\"  BOSU fwd alt lunge 20x with no UEs                 Charges: TE x 2, NMR x 1       Total Timed Treatment: 40 min  Total Treatment Time: 40 min

## 2023-08-08 NOTE — PROGRESS NOTES
Dx:   Orthopedic aftercare (Z47.89) - S/P R TKA       Authorized # of Visits:  12 per POC (Medicare/Medicaid)           Next MD visit: none scheduled  Fall Risk: standard         Precautions: dementia, cancer (lymphoma head), depression, thyroid disorder, hypertension, hyperlipidemia, osteoporosis, TIA, craniotomy, hysterectomy, R knee scope, 3 right hand surgeries, thoracic stenosis, lumbar stenosis, lumbar disc herniation, lumbosacral DDD, chronic pain of both shoulders, RA. Date of evaluation: 6/27/23  Date of surgery: 5/22/23  Referring physician: Lisa Rojas PA-C/Dr. Sera Stout        Medication Changes since last visit?: No  Subjective: Pt indicates she is ambulating independently; does not currently stair climb in home. Continues to c/o primarily night pain; denies significant pain limiting activities during the day. Pain 1/10 at best, 5/10 at worst.     Objective: TTP distal ITB and quadriceps in combination with increased muscle tension. Assessment:  Pt able to accomplish all exercises without provocation of pain or limitation secondary to fatigue, yet displays increased visible difficulty with lateral and anterior step up on the R compared to the L with compensatory lateral rotation of the body. Additionally, pt initially challenged with eccentric control with chair squats, however, following verbal cues, improvement in control noted.       Goals (to be achieved in 12 visits):    Pt will improve knee extension ROM to 0 deg to allow proper heel strike during gait and terminal knee extension in stance  Pt will improve knee AROM flexion to >120 degrees to improve ability to perform lower body dressing  Pt will improve hip strength to at least 4+/5 and knee strength to 5/5 to ascend 1 flight of stairs reciprocally without UE assist   Pt will improve SLS to >10s to improve safety and independence with gait on uneven surfaces such as grass   Patient will demo normalized gait pattern with least restrictive AD to be able to walk for 20 minutes  Pt will be independent and compliant with comprehensive HEP to maintain progress achieved in PT    Plan: Pt to trial HEP for ~3 weeks to determine benefit in current c/o pain; call to schedule additional appointments as needed. Communicated primary therapist's recommendation noted above to pt's daughter who interpreted for pt. Pt and pt's daughter in agreement with POC.         Visit #6  Date: 7/20/23 Visit #7  Date: 7/25/23 Visit #8  Date: 7/27/23 Visit #9  Date: 8/1/23 Visit #10  Date: 8/8/2023   TherEx (32 min):  NU step L5 x 5 min UEs/LEs  Heel slides with therapist OP 10\"x10  SAQ over bolster 2x15, 5\" holds with 3#  Seated ham curl 2x15 with GTB   Shuttle DL press 2x15 with 6.5 cords  Shuttle SL press 2x15 with 4 cords  Sit to stand from standard chair with no UEs 15x  HS stretch at stair 3x30\"            TherEx (30 min):  NU step L5 x 5 min UEs/LEs  Heel slides with therapist OP 10\"x10  SLR with quad set 2x15  Lateral walk with YTB 2x30'   Monster walk with YTB 2x30'   6\" fwd step down 15x with 1 hand rail  6\" lat step up 20x no UEs  Standing heel/toe raise 20x  Shuttle DL press 2x15 with 7 cords  Shuttle SL press 2x15 with 4 cords    TherEx (30 min):  NU step L5 x 5 min UEs/LEs  Heel slides with therapist OP 10\"x10  SLR with quad set 2x15  Bridge with ball squeeze 2x15, 5\" holds   Shuttle DL press 2x15 with 7 cords  Shuttle SL press 2x15 with 4 cords  6\" fwd step up 20x no UEs  6\" fwd step down 20x with 1 UE  HS stretch at stair 3x30\"       Manual:  Patellar mobs x 3 min TherEx (32 min):  NU step L5 x 5 min UEs/LEs  Heel slides with therapist OP 10\"x10  S/L clam 20x5\" R/L with YTB  LAQ 2x15, 5\" holds with 3#  8\" fwd step up 20x with 1 UE  8\" lateral step up 20x with rail  Shuttle DL press 2x15 with 7 cords  Shuttle SL press 2x15 with 4.5 cords  Lateral walk with YTB 2x30'   Monster walk with YTB 2x30'  TherEx   PROM R knee flexion  SLR with quadriceps set 3x10  SL Shuttle leg press R/L 2x15 Lv4.5     NMR (8 min):  Tilt board A/P and M/L taps 20x ea  Air ex march 20x  BOSU fwd alt lunge 20x with 1 UE NMR (10 min):   Air ex march 30x  Single leg cone touches with 2 cones 15x R/L   Tilt board A/P and M/L taps 20x ea to balance x 30\"    NMR (8 min):  Single leg cone touches with 2 cones 15x R/L   Tilt board A/P and M/L taps 20x ea to balance x 30\"  NMR (8 min):  SLS on air ex 2x30\"  BOSU fwd alt lunge 20x with no UEs            TherAct  Nustep for knee flexion Lv4 x5\"  Chair squat Air ex 3x10  Anterior step up 2x10 8\"  Lateral step up 2x10 8\" R/L  Bosu anterior lunge 2x10 R/L   Lateral walking Red 3 laps         Manual  STM R quadriceps, ITB, hamstring       Charges: 2 TherAct x31\", TherEx x12\"       Total Timed Treatment: 45 min  Total Treatment Time: 46 min

## 2023-08-17 NOTE — PROGRESS NOTES
NURSING INTAKE COMMENTS: Patient presents with:  Post-Op: Total right knee, finished PT, denies pain       HPI: This 69year old female presents today with complaints of right knee follow up. Patient is three months post operative from right total knee arthroplasty. States she is doing well today. She is not currently in any pain and is not taking any pain medication. She completed physical therapy two weeks ago. Past Medical History:   Diagnosis Date    Arthritis     Rheumatoid Arthritis    Cancer ([facility] [address]     lymphoma head    Dementia ([facility] [address]     Depression     Disorder of thyroid     Esophageal reflux     Fibromyalgia     Hearing difficulty     Hearing impairment     hearing aids left    High blood pressure     Hyperlipidemia     Hypertension     Osteoporosis     Osteoporosis     TIA (transient ischemic attack)     Unspecified essential hypertension     Visual impairment     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 about 8 years ago    COLONOSCOPY N/A 2/15/2023    Procedure: COLONOSCOPY;  Surgeon: Jovon Reza MD;  Location: [address] [place] [address] ENDOSCOPY    HYSTERECTOMY      KNEE SURGERY Right 10/2022    OTHER SURGICAL HISTORY      Bionic Eye    OTHER SURGICAL HISTORY      lymphoma biopsy    OTHER SURGICAL HISTORY      3 RT hand surgeries for broken fingers     TUBAL LIGATION       Current Outpatient Medications   Medication Sig Dispense Refill    ZINC OR Use as directed in the mouth or throat. DICLOFENAC OR Take 75 mg by mouth in the morning and 75 mg before bedtime. celecoxib 200 MG Oral Cap Take 1 capsule (200 mg total) by mouth daily. 30 capsule 0    SERTRALINE 100 MG Oral Tab TAKE 1 TABLET(100 MG) BY MOUTH EVERY NIGHT 90 tablet 0    donepezil 10 MG Oral Tab Take 1 tablet (10 mg total) by mouth every evening.  90 tablet 3    LISINOPRIL 20 MG Oral Tab TAKE 1 TABLET BY MOUTH DAILY 90 tablet 1    PREGABALIN 100 MG Oral Cap TAKE 1 CAPSULE(100 MG) BY MOUTH TWICE DAILY 180 capsule 1    diclofenac 0.1 % Ophthalmic Solution INSTILL 1 DROP INTO IN AFFECTED EYE(S) AS DIRECTED 5 mL 0    Cinacalcet HCl 30 MG Oral Tab Take 1/2 tablet daily      Multiple Vitamins-Minerals (ONE-A-DAY WOMENS 50+ ADVANTAGE) Oral Tab Take 1 tablet by mouth daily. acetaminophen (TYLENOL) 325 MG Oral Tab Take 500 mg by mouth every 6 (six) hours as needed for Pain. alendronate 70 MG Oral Tab Take 1 tablet (70 mg total) by mouth every 7 days.  (Patient not taking: Reported on 7/5/2023) 12 tablet 3       Dust Mite Extract       Runny nose  Latex                   RASH    Comment:itching  Latex                   ITCHING    Comment:If exposed for long time  Seasonal                UNKNOWN  Family History   Problem Relation Age of Onset    Arthritis Father         Rheumatoid    Cancer Mother         uterine cancer    Other (Other) Maternal Grandmother     Other (Other) Maternal Grandfather     Other (Other) Paternal Grandmother     Other (Other) Paternal Grandfather     Cancer Cousin         breast cancer       Social History    Occupational History      Not on file    Tobacco Use      Smoking status: Never      Smokeless tobacco: Never    Vaping Use      Vaping Use: Never used    Substance and Sexual Activity      Alcohol use: Not Currently        Alcohol/week: 0.0 standard drinks of alcohol      Drug use: No      Sexual activity: Not on file       Review of Systems:  GENERAL: denies fevers, chills, night sweats, fatigue, unintentional weight loss/gain  SKIN: denies skin lesions, open sores, rash  HEENT:denies recent vision change, new nasal congestion,hearing loss, tinnitus, sore throat, headaches  RESPIRATORY: denies new shortness of breath, cough, asthma, wheezing  CARDIOVASCULAR: denies chest pain, leg cramps with exertion, palpitations, leg swelling  GI: denies abdominal pain, nausea, vomiting, diarrhea, constipation, hematochezia, worsening heartburn or stomach ulcers  : denies dysuria, hematuria, incontinence, increased frequency, urgency, difficulty urinating  MUSCULOSKELETAL: denies musculoskeletal complaints other than in HPI  NEURO: denies numbness, tingling, weakness, balance issues, dizziness, memory loss  PSYCHIATRIC: denies Hx of depression, anxiety, other psychiatric disorders  HEMATOLOGIC: denies blood clots, anemia, blood clotting disorders, blood transfusion  ENDOCRINE: denies autoimmune disease, thyroid issues, or diabetes  ALLERGY: denies asthma, seasonal allergies    Physical Examination:    There were no vitals taken for this visit. Constitutional: appears well hydrated, alert and responsive, no acute distress noted  Extremities: Right knee scar is flat and well healed. No effusion of the right knee. No calf tenderness or swelling. No erythema or palpable warmth. Range of motion from 0-130 degrees with no pain. Good varus valgus stabilty in midflexion. No numbness. Neurological: Unchanged    Imaging:   No results found. Labs:  Lab Results   Component Value Date    WBC 4.5 05/25/2023    HGB 8.5 (L) 05/25/2023    .0 (L) 05/25/2023      Lab Results   Component Value Date    GLU 94 05/25/2023    BUN 19 (H) 05/25/2023    CREATSERUM 0.71 05/25/2023    GFR >60 04/13/2015    GFRNAA >60 11/27/2016    GFRAA >60 11/27/2016        Assessment and Plan:  Diagnoses and all orders for this visit:    Orthopedic aftercare  -     XR KNEE (3 VIEWS), RIGHT (CPT=73562); Future        Assessment: Healing right total knee arthroplasty     Plan: Patient may continue home exercises as needed. Advised icing, oral anti-inflammatories, and activity modification as needed. Accompanied by daughter who helped with interpretation. Follow up in 9 months for annual post op with new x-rays    Follow Up: Return in about 9 months (around 5/17/2024).     Alida Hernandez PA-C

## 2023-09-05 NOTE — TELEPHONE ENCOUNTER
Marked as \"therapy completed\" by a CMA at office visit with Dr. Quiroz Covert On 7/5/2023.       REFILL DENIED    Patient has not been seen in office since 8/08/2022

## 2023-09-08 NOTE — TELEPHONE ENCOUNTER
Please review. Protocol failed/ No protocol. Requested Prescriptions   Pending Prescriptions Disp Refills    pregabalin 100 MG Oral Cap [Pharmacy Med Name: PREGABALIN 100MG CAPSULES] 180 capsule 1     Sig: Take 1 capsule (100 mg total) by mouth 2 (two) times daily.        There is no refill protocol information for this order          Recent Outpatient Visits              3 weeks ago Orthopedic aftercare    Andi Nguyen, [place], [place], [employer]    Office Visit    1 month ago     [facility] [place] [address]    1 month ago     [facility] in [place], [place]    Office Visit    1 month ago     [facility] in [place], [place]    Office Visit    1 month ago     [facility] in [place], [place]    Office Visit            Future Appointments         Provider Department Appt Notes    In 1 month Landen Orosco MD [address], [facility], [facility] refill last px 1/5/2023, msg sent, appt too soon    In 8 months Gloria Heck MD [address], [address], [place] [address] [facility]

## 2023-09-08 NOTE — TELEPHONE ENCOUNTER
Refill passed per [facility] protocol. Requested Prescriptions   Pending Prescriptions Disp Refills    lisinopril 20 MG Oral Tab 90 tablet 1     Sig: Take 1 tablet (20 mg total) by mouth daily.        Hypertensive Medications Protocol Passed - 9/7/2023  5:38 PM        Passed - In person appointment in the past 12 or next 3 months     Recent Outpatient Visits              3 weeks ago Orthopedic aftercare    [address], [place]    Office Visit    1 month ago     [address] [place] [address] in [place], [address] [place] [address]    1 month ago     [address] [place] [address] in [place] [address]    1 month ago     [address] [place] [address] in [place], [place]    Office Visit    1 month ago     [address] [place] [address] in [place], [place]    Office Visit          Future Appointments         Provider Department Appt Notes    In 1 month Mariama Yuan MD [address], [facility], [place] [facility] refill last px 1/5/2023, msg sent, appt too soon    In 8 months Wil Evangelista MD [address], [address], [place] FOLLOW UP               Passed - Last BP reading less than 140/90     BP Readings from Last 1 Encounters:  05/25/23 : 107/48              Passed - CMP or BMP in past 6 months     Recent Results (from the past 4392 hour(s))   Basic Metabolic Panel (8)    Collection Time: 05/25/23  4:31 AM   Result Value Ref Range    Glucose 94 70 - 99 mg/dL    Sodium 142 136 - 145 mmol/L    Potassium 4.5 3.5 - 5.1 mmol/L    Chloride 113 (H) 98 - 112 mmol/L    CO2 26.0 21.0 - 32.0 mmol/L    Anion Gap 3 0 - 18 mmol/L    BUN 19 (H) 7 - 18 mg/dL    Creatinine 0.71 0.55 - 1.02 mg/dL    BUN/CREA Ratio 26.8 (H) 10.0 - 20.0    Calcium, Total 8.2 (L) 8.5 - 10.1 mg/dL    Calculated Osmolality 296 (H) 275 - 295 mOsm/kg    eGFR-Cr 88 >=60 mL/min/1.73m2     *Note: Due to a large number of results and/or encounters for the requested time period, some results have not been displayed. A complete set of results can be found in Results Review. Passed - In person appointment or virtual visit in the past 6 months     Recent Outpatient Visits              3 weeks ago Orthopedic aftercare    [address] [place] [address] [facility]    Office Visit    1 month ago     [facility] in [place] [address]    1 month ago     [facility] in [place] [address]    1 month ago     [facility] in [place], [place]    Office Visit    1 month ago     [facility] in [place], [place]    Office Visit          Future Appointments         Provider Department Appt Notes    In 1 month Louise Sky MD [address], [facility], [place] [facility] pain[facility]med refill last px 1/5/2023, msg sent, appt too soon    In 8 months Radu Higuera MD [address], [address], [place] FOLLOW UP               Passed - EGFRCR or GFRNAA > 50     GFR Evaluation  EGFRCR: 88 , resulted on 5/25/2023            ZINC OR  0     Sig: Use as directed in the mouth or throat. There is no refill protocol information for this order       DICLOFENAC OR  0     Sig: Take 75 mg by mouth in the morning and 75 mg before bedtime.        Non-Narcotic Pain Medication Protocol Passed - 9/7/2023  5:38 PM        Passed - In person appointment or virtual visit in the past 6 mos or appointment in next 3 mos     Recent Outpatient Visits              3 weeks ago Orthopedic aftercare    [address] [place] [address] [facility]    Office Visit    1 month ago     [facility] in [place] [address]    1 month ago     [facility] in [place], [place]    Office Visit    1 month ago     [facility] in [place], [place]    Office Visit    1 month ago     [facility] in [place], [place]    Office Visit          Future Appointments         Provider Department Appt Notes    In 1 month Angelita Rush MD [address] [facility], [place] [facility] pain&med refill last px 1/5/2023, msg sent, appt too soon    In 6 months Svitlana Fairbanks MD [address], [address], [address] [place] [place] [address]                  Future Appointments         Provider Department Appt Notes    In 1 month Angelita Rush MD [address] [facility], [place] [facility] pain&med refill last px 1/5/2023, msg sent, appt too soon    In 8 months Svitlana Fairbanks MD [address], [address], [facility] Visits              3 weeks ago Orthopedic aftercare    [address] [facility], Thi Almendarez, [employer]    Office Visit    1 month ago     [facility] in [place], [facility], [facility]    1 month ago     [facility] [place] [address]    1 month ago     [facility] in [facility] include 234 beds at the [facility] 18    1 month ago     [facility] in [place], [place]    Office Visit

## 2023-09-08 NOTE — TELEPHONE ENCOUNTER
Please review. Protocol failed / Has no protocol. Diclofenac written by Dr. Chaz Rose, (please disregard, I now see this was recently refused)  Nonsteroidal Anti-inflammatory Agents (NSAIDs)     Controlled? Refusal Reason Refused By Encounter Date   DICLOFENAC 75 MG Oral Tab EC [Pharmacy Med Name: DICLOFENAC SODIUM 75MG DR TABLETS]  Refill not appropriate Ethel Jj MD 9/2/2023      Other Meds   (16 additional refused meds on file. Expand for details.)        Requested Prescriptions   Pending Prescriptions Disp Refills    ZINC OR  0     Sig: Use as directed in the mouth or throat. There is no refill protocol information for this order       DICLOFENAC OR  0     Sig: Take 75 mg by mouth in the morning and 75 mg before bedtime. Non-Narcotic Pain Medication Protocol Passed - 9/7/2023  5:38 PM        Passed - In person appointment or virtual visit in the past 6 mos or appointment in next 3 mos     Recent Outpatient Visits              3 weeks ago Orthopedic aftercare    [address], [address] [facility],[address], [facility]    Office Visit    1 month ago     [facility] in [place], [address] [place] [address]    1 month ago     [facility] in [place], [place]    Office Visit    1 month ago     [facility] in [place], [place]    Office Visit    1 month ago     [facility] in [place], [place]    Office Visit          Future Appointments         Provider Department Appt Notes    In 1 month Gilmar Ly MD [address], [place] [facility] pain&med refill last px 1/5/2023, msg sent, appt too soon    In 8 months MD Pedro Cornelius [place] FOLLOW UP                Signed Prescriptions Disp Refills    lisinopril 20 MG Oral Tab 90 tablet 3     Sig: Take 1 tablet (20 mg total) by mouth daily.        Hypertensive Medications Protocol Passed - 9/7/2023  5:38 PM        Passed - In person appointment in the past 12 or next 3 months     Recent Outpatient Visits              3 weeks ago Orthopedic aftercare    Paco Gomez, [place], [place], [place]    Office Visit    1 month ago     [facility] in [facility] 18    1 month ago     [facility] in [place], Annita Severin, [address]    Office Visit    1 month ago     [facility] in [place], Lucila Severin, [address]    Office Visit    1 month ago     [facility] in [place], Lucila Severin, [address]    Office Visit          Future Appointments         Provider Department Appt Notes    In 1 month MD Paco Sanchez, [place] [facility] pain&med refill last px 1/5/2023, msg sent, appt too soon    In 8 months Radu Higuera MD [address] [facility],[address], [facility] 9 [place] FOLLOW UP               Passed - Last BP reading less than 140/90     BP Readings from Last 1 Encounters:  05/25/23 : 107/48              Passed - CMP or BMP in past 6 months     Recent Results (from the past 4392 hour(s))   Basic Metabolic Panel (8)    Collection Time: 05/25/23  4:31 AM   Result Value Ref Range    Glucose 94 70 - 99 mg/dL    Sodium 142 136 - 145 mmol/L    Potassium 4.5 3.5 - 5.1 mmol/L    Chloride 113 (H) 98 - 112 mmol/L    CO2 26.0 21.0 - 32.0 mmol/L    Anion Gap 3 0 - 18 mmol/L    BUN 19 (H) 7 - 18 mg/dL    Creatinine 0.71 0.55 - 1.02 mg/dL    BUN/CREA Ratio 26.8 (H) 10.0 - 20.0    Calcium, Total 8.2 (L) 8.5 - 10.1 mg/dL    Calculated Osmolality 296 (H) 275 - 295 mOsm/kg    eGFR-Cr 88 >=60 mL/min/1.73m2     *Note: Due to a large number of results and/or encounters for the requested time period, some results have not been displayed. A complete set of results can be found in Results Review.                Passed - In person appointment or virtual visit in the past 6 months     Recent Outpatient Visits              3 weeks ago Orthopedic aftercare    [address], [place]    Office Visit    1 month ago     [address] in [facility], [address] [place] Visit    1 month ago     [address] [place] [address] Road in [place], [place]    Office Visit    1 month ago     [address] [place] [address] Road in [place], [place]    Office Visit    1 month ago     [address] [place] [address] in [place], [place]    Office Visit          Future Appointments         Provider Department Appt Notes    In 1 month Kim Gale MD [address], [address], [place] [facility] pain&med refill last px 1/5/2023, msg sent, appt too soon    In 8 months Sylwia Caldwell MD [address], [address], [facility] or GFRNAA > 50     GFR Evaluation  EGFRCR: 88 , resulted on 5/25/2023             Future Appointments         Provider Department Appt Notes    In 1 month Kim Gale MD [address], [address], [place] [facility] pain[facility]med refill last px 1/5/2023, msg sent, appt too soon    In 8 months Sylwia Caldwell MD [address], [address], [address] Visits              3 weeks ago Orthopedic aftercare    [address] [place], [place]    Office Visit    1 month ago     [address] in [facility], [place] [address]    1 month ago     [address] [place] [address] Road in [facility], [place] [address]    1 month ago     [address] [place] [address] Road in [facility] [place] [address]    1 month ago     [address] [place] [address] Road in [place], [place]    Office Visit

## 2023-09-19 NOTE — TELEPHONE ENCOUNTER
Refill Request    Medication request: SERTRALINE 100 MG Oral Tab. TAKE 1 TABLET(100 MG) BY MOUTH EVERY NIGHT     LOV: 08/16/2022 with Guido Hernandez M.D. Due back to clinic per last office note: RTC post injection f/u  NOV: 11/1/2023 Alexi Lerma MD      [place] 06/27/2023 #90  Earliest fill date: 09/25/2023 - order changed to reflect earliest fill date. Urine drug screen (if applicable): n/a  Pain contract: n/a    LOV plan (if weaning or changing medications):  No mention of Sertraline in LOV visit or previous OV note.

## 2023-11-01 NOTE — PATIENT INSTRUCTIONS
1) Please begin physical therapy as soon as possible. 2) Please get X-rays of the Lumbar spine today on your way out. 3) Continue Sertraline 100 mg nightly. I will refill this for you until you see Dr. Mayela Buchanan  4) Continue Lyrica 100 mg twice per day. 5) Stop Diclofenac as you are having abdominal pain  6) Follow up with me in about 8 weeks. If symptoms persist, then would recommend MRI lumbar spine  8) Tylenol 500-1000 mg every 6-8 hours as needed for pain. No more than 3000 mg daily.

## 2023-11-02 NOTE — PROGRESS NOTES
[address]  Progress Note    CHIEF COMPLAINT:  Patient presents with: Follow - Up: Pt here for a follow up. Pt reports chronic right shoulder pain and chronic low back pain, traveling down to leg pain (L>R),  intermittent, worst with activity. Pain on right shoulder reported as 03/10, worst at night 06/10, intermittent, stabbing pain. Pt reports N/T on BLE as well. Takes diclofenac and pregabalin (some help). History of Present Illness: The patient is a 69year old female with a significant past medical history of hypertension, hyperlipidemia, TIA, dementia, and lymphoma who presents with complaints of left leg pain and popliteal fossa discomfort that is been ongoing for some time now without an inciting event. She does associate this with back pain. Her pain as high as 6 out of 10. She has been taking sertraline as well as diclofenac. She is also been taking Lyrica. Her medications ran out and she started to have increased pain. No recent imaging of the lumbar spine has been performed. She did have a right knee replacement earlier this year.     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        SURGICAL HISTORY:  Past Surgical History:   Procedure Laterality Date    BRAIN SURGERY  10/31/2016    Right frontal craniotomy for tumor resection    CHOLECYSTECTOMY  2012    COLONOSCOPY      at [facility] about 8 years ago    COLONOSCOPY N/A 2/15/2023    Procedure: COLONOSCOPY;  Surgeon: Remi Sarah Nahun Almanza MD;  Location: [address] [place] [address] ENDOSCOPY    HYSTERECTOMY      KNEE SURGERY Right 10/2022    OTHER SURGICAL HISTORY      Bionic Eye    OTHER SURGICAL HISTORY      lymphoma biopsy    OTHER SURGICAL HISTORY      3 RT hand surgeries for broken fingers     TUBAL LIGATION         SOCIAL HISTORY:   Social History    Occupational History      Not on file    Tobacco Use      Smoking status: Never      Smokeless tobacco: Never    Vaping Use      Vaping Use: Never used    Substance and Sexual Activity      Alcohol use: Not Currently        Alcohol/week: 0.0 standard drinks of alcohol      Drug use: No      Sexual activity: Not on file      FAMILY HISTORY:   Family History   Problem Relation Age of Onset    Arthritis Father         Rheumatoid    Cancer Mother         uterine cancer    Other (Other) Maternal Grandmother     Other (Other) Maternal Grandfather     Other (Other) Paternal Grandmother     Other (Other) Paternal Grandfather     Cancer Cousin         breast cancer       CURRENT MEDICATIONS:   Current Outpatient Medications   Medication Sig Dispense Refill    sertraline 100 MG Oral Tab Take 1 tablet (100 mg total) by mouth nightly. 90 tablet 3    pregabalin 100 MG Oral Cap Take 1 capsule (100 mg total) by mouth 2 (two) times daily. 60 capsule 11    lisinopril 20 MG Oral Tab Take 1 tablet (20 mg total) by mouth daily. 90 tablet 3    donepezil 10 MG Oral Tab Take 1 tablet (10 mg total) by mouth every evening. 90 tablet 3    alendronate 70 MG Oral Tab Take 1 tablet (70 mg total) by mouth every 7 days. 12 tablet 3    Cinacalcet HCl 30 MG Oral Tab Take 1/2 tablet daily      ZINC OR Use as directed in the mouth or throat. ALLERGIES:     Dust Mite Extract       Runny nose  Latex                   RASH    Comment:itching  Latex                   ITCHING    Comment:If exposed for long time  Seasonal                UNKNOWN    REVIEW OF SYSTEMS:   Review of Systems   Constitutional: Negative. HENT: Negative.     Eyes: Negative. Respiratory: Negative. Cardiovascular: Negative. Gastrointestinal: Negative. Genitourinary: Negative. Musculoskeletal: As per HPI  Skin: Negative. Neurological: As per HPI  Endo/Heme/Allergies: Negative. Psychiatric/Behavioral: Negative. All other systems reviewed and are negative. Pertinent positives and negatives noted in the HPI. PHYSICAL EXAM:   There were no vitals taken for this visit. There is no height or weight on file to calculate BMI. General: No immediate distress  Head: Normocephalic/ Atraumatic  Eyes: Extra-occular movements intact. Ears: No auricular hematoma or deformities  Mouth: No lesions or ulcerations  Heart: peripheral pulses intact. Normal capillary refill. Lungs: Non-labored respirations  Abdomen: No abdominal guarding  Extremities: No lower extremity edema bilaterally   Skin: No lesions noted. Cognition: alert & oriented x 3, attentive, able to follow 2 step commands, comprehention intact, spontaneous speech intact  Motor:    Musculoskeletal:    LUMBAR SPINE:  Inspection: no erythema, swelling, or obvious deformity. Their iliac crest and shoulder heights are symmetrical.  Postural exam reveals no scoliosis or kyphosis.   Palpation: Palpation over the left lower lumbar facets and paraspinals, left gluten piriformis  ROM: Active range of motion of the lumbar spine  Motor: 5/5 in all myotomes of the BILATERAL lower extremities except 4 out of 5 during left great toe extension  Sensation: Intact to light touch in all dermatomes of the lower extremities   Reflexes: 1/4 at L4 and S1  Straight leg raise: negative for radicular pain symptoms  Slump test: negative for pain symptoms or radicular pain symptoms      Gait:  normal    Data  Admission on 05/22/2023, Discharged on 05/25/2023   Component Date Value Ref Range Status    Case Report 05/22/2023    Final                    Value:Surgical Pathology                                Case: [id number]                                  Authorizing Provider:  Steve Norris MD      Collected:           05/22/2023 09:06 AM          Ordering Location:     [facility] Pre Op   Received:            05/22/2023 12:19 PM                                 Recovery                                                                     Pathologist:           Corey Carmona MD                                                                           Specimen:    Knee, right, 1. Right Knee Bone and Tissue                                                 Final Diagnosis: 05/22/2023    Final                    Value: This result contains rich text formatting which cannot be displayed here. Clinical Information 05/22/2023    Final                    Value: This result contains rich text formatting which cannot be displayed here. Gross Description 05/22/2023    Final                    Value: This result contains rich text formatting which cannot be displayed here.     Interpretation 05/22/2023 Benign    Final    Glucose 05/23/2023 118 (H)  70 - 99 mg/dL Final    Sodium 05/23/2023 137  136 - 145 mmol/L Final    Potassium 05/23/2023 4.9  3.5 - 5.1 mmol/L Final    Chloride 05/23/2023 109  98 - 112 mmol/L Final    CO2 05/23/2023 25.0  21.0 - 32.0 mmol/L Final    Anion Gap 05/23/2023 3  0 - 18 mmol/L Final    BUN 05/23/2023 21 (H)  7 - 18 mg/dL Final    Creatinine 05/23/2023 0.82  0.55 - 1.02 mg/dL Final    BUN/CREA Ratio 05/23/2023 25.6 (H)  10.0 - 20.0 Final    Calcium, Total 05/23/2023 9.0  8.5 - 10.1 mg/dL Final    Calculated Osmolality 05/23/2023 288  275 - 295 mOsm/kg Final    eGFR-Cr 05/23/2023 74  >=60 mL/min/1.73m2 Final    HGB 05/23/2023 9.7 (L)  12.0 - 16.0 g/dL Final    HCT 05/23/2023 30.6 (L)  35.0 - 48.0 % Final    Glucose 05/24/2023 112 (H)  70 - 99 mg/dL Final    Sodium 05/24/2023 142  136 - 145 mmol/L Final Potassium 05/24/2023 4.4  3.5 - 5.1 mmol/L Final    Chloride 05/24/2023 113 (H)  98 - 112 mmol/L Final    CO2 05/24/2023 25.0  21.0 - 32.0 mmol/L Final    Anion Gap 05/24/2023 4  0 - 18 mmol/L Final    BUN 05/24/2023 16  7 - 18 mg/dL Final    Creatinine 05/24/2023 0.66  0.55 - 1.02 mg/dL Final    BUN/CREA Ratio 05/24/2023 24.2 (H)  10.0 - 20.0 Final    Calcium, Total 05/24/2023 8.2 (L)  8.5 - 10.1 mg/dL Final    Calculated Osmolality 05/24/2023 296 (H)  275 - 295 mOsm/kg Final    eGFR-Cr 05/24/2023 91  >=60 mL/min/1.73m2 Final    WBC 05/24/2023 5.8  4.0 - 11.0 x10(3) uL Final    RBC 05/24/2023 3.00 (L)  3.80 - 5.30 x10(6)uL Final    HGB 05/24/2023 8.8 (L)  12.0 - 16.0 g/dL Final    HCT 05/24/2023 27.8 (L)  35.0 - 48.0 % Final    MCV 05/24/2023 92.7  80.0 - 100.0 fL Final    MCH 05/24/2023 29.3  26.0 - 34.0 pg Final    MCHC 05/24/2023 31.7  31.0 - 37.0 g/dL Final    RDW-SD 05/24/2023 44.3  35.1 - 46.3 fL Final    RDW 05/24/2023 13.1  11.0 - 15.0 % Final    PLT 05/24/2023 119.0 (L)  150.0 - 450.0 10(3)uL Final    Neutrophil Absolute Prelim 05/24/2023 4.04  1.50 - 7.70 x10 (3) uL Final    Neutrophil Absolute 05/24/2023 4.04  1.50 - 7.70 x10(3) uL Final    Lymphocyte Absolute 05/24/2023 1.07  1.00 - 4.00 x10(3) uL Final    Monocyte Absolute 05/24/2023 0.54  0.10 - 1.00 x10(3) uL Final    Eosinophil Absolute 05/24/2023 0.13  0.00 - 0.70 x10(3) uL Final    Basophil Absolute 05/24/2023 0.03  0.00 - 0.20 x10(3) uL Final    Immature Granulocyte Absolute 05/24/2023 0.02  0.00 - 1.00 x10(3) uL Final    Neutrophil % 05/24/2023 69.3  % Final    Lymphocyte % 05/24/2023 18.4  % Final    Monocyte % 05/24/2023 9.3  % Final    Eosinophil % 05/24/2023 2.2  % Final    Basophil % 05/24/2023 0.5  % Final    Immature Granulocyte % 05/24/2023 0.3  % Final    Urine Color 05/24/2023 Colorless (A)  Yellow Final    Clarity Urine 05/24/2023 Clear  Clear Final    Spec Gravity 05/24/2023 1.005  1.005 - 1.030 Final    Glucose Urine 05/24/2023 Normal  Normal mg/dL Final    Bilirubin Urine 05/24/2023 Negative  Negative Final    Ketones Urine 05/24/2023 Negative  Negative mg/dL Final    Blood Urine 05/24/2023 Negative  Negative Final    pH Urine 05/24/2023 5.5  5.0 - 8.0 Final    Protein Urine 05/24/2023 Negative  Negative, Trace mg/dL Final    Urobilinogen Urine 05/24/2023 Normal  Normal Final    Nitrite Urine 05/24/2023 Negative  Negative Final    Leukocyte Esterase Urine 05/24/2023 Negative  Negative Final    Glucose 05/25/2023 94  70 - 99 mg/dL Final    Sodium 05/25/2023 142  136 - 145 mmol/L Final    Potassium 05/25/2023 4.5  3.5 - 5.1 mmol/L Final    Chloride 05/25/2023 113 (H)  98 - 112 mmol/L Final    CO2 05/25/2023 26.0  21.0 - 32.0 mmol/L Final    Anion Gap 05/25/2023 3  0 - 18 mmol/L Final    BUN 05/25/2023 19 (H)  7 - 18 mg/dL Final    Creatinine 05/25/2023 0.71  0.55 - 1.02 mg/dL Final    BUN/CREA Ratio 05/25/2023 26.8 (H)  10.0 - 20.0 Final    Calcium, Total 05/25/2023 8.2 (L)  8.5 - 10.1 mg/dL Final    Calculated Osmolality 05/25/2023 296 (H)  275 - 295 mOsm/kg Final    eGFR-Cr 05/25/2023 88  >=60 mL/min/1.73m2 Final    Phosphorus 05/25/2023 2.0 (L)  2.5 - 4.9 mg/dL Final    Magnesium 05/25/2023 1.7  1.6 - 2.6 mg/dL Final    WBC 05/25/2023 4.5  4.0 - 11.0 x10(3) uL Final    RBC 05/25/2023 2.87 (L)  3.80 - 5.30 x10(6)uL Final    HGB 05/25/2023 8.5 (L)  12.0 - 16.0 g/dL Final    HCT 05/25/2023 27.0 (L)  35.0 - 48.0 % Final    MCV 05/25/2023 94.1  80.0 - 100.0 fL Final    MCH 05/25/2023 29.6  26.0 - 34.0 pg Final    MCHC 05/25/2023 31.5  31.0 - 37.0 g/dL Final    RDW-SD 05/25/2023 46.3  35.1 - 46.3 fL Final    RDW 05/25/2023 13.4  11.0 - 15.0 % Final    PLT 05/25/2023 106.0 (L)  150.0 - 450.0 10(3)uL Final    Neutrophil Absolute Prelim 05/25/2023 2.25  1.50 - 7.70 x10 (3) uL Final    Neutrophil Absolute 05/25/2023 2.25  1.50 - 7.70 x10(3) uL Final    Lymphocyte Absolute 05/25/2023 1.57  1.00 - 4.00 x10(3) uL Final    Monocyte Absolute 05/25/2023 0. 48  0.10 - 1.00 x10(3) uL Final    Eosinophil Absolute 05/25/2023 0.20  0.00 - 0.70 x10(3) uL Final    Basophil Absolute 05/25/2023 0.01  0.00 - 0.20 x10(3) uL Final    Immature Granulocyte Absolute 05/25/2023 0.01  0.00 - 1.00 x10(3) uL Final    Neutrophil % 05/25/2023 49.9  % Final    Lymphocyte % 05/25/2023 34.7  % Final    Monocyte % 05/25/2023 10.6  % Final    Eosinophil % 05/25/2023 4.4  % Final    Basophil % 05/25/2023 0.2  % Final    Immature Granulocyte % 05/25/2023 0.2  % Final   [facility] Lab Encounter on 05/16/2023   Component Date Value Ref Range Status    Glucose 05/16/2023 93  70 - 99 mg/dL Final    Sodium 05/16/2023 141  136 - 145 mmol/L Final    Potassium 05/16/2023 4.3  3.5 - 5.1 mmol/L Final    Chloride 05/16/2023 112  98 - 112 mmol/L Final    CO2 05/16/2023 26.0  21.0 - 32.0 mmol/L Final    Anion Gap 05/16/2023 3  0 - 18 mmol/L Final    BUN 05/16/2023 29 (H)  7 - 18 mg/dL Final    Creatinine 05/16/2023 0.74  0.55 - 1.02 mg/dL Final    BUN/CREA Ratio 05/16/2023 39.2 (H)  10.0 - 20.0 Final    Calcium, Total 05/16/2023 10.0  8.5 - 10.1 mg/dL Final    Calculated Osmolality 05/16/2023 298 (H)  275 - 295 mOsm/kg Final    eGFR-Cr 05/16/2023 84  >=60 mL/min/1.73m2 Final    ALT 05/16/2023 20  13 - 56 U/L Final    AST 05/16/2023 19  15 - 37 U/L Final    Alkaline Phosphatase 05/16/2023 106  55 - 142 U/L Final    Bilirubin, Total 05/16/2023 0.6  0.1 - 2.0 mg/dL Final    Total Protein 05/16/2023 7.9  6.4 - 8.2 g/dL Final    Albumin 05/16/2023 3.6  3.4 - 5.0 g/dL Final    Globulin  05/16/2023 4.3  2.8 - 4.4 g/dL Final    A/G Ratio 05/16/2023 0.8 (L)  1.0 - 2.0 Final    Patient Fasting for CMP? 05/16/2023 Yes   Final    MRSA Screen By PCR 05/16/2023 Negative  Negative Final    Urine Color 05/16/2023 Yellow  Yellow Final    Clarity Urine 05/16/2023 Clear  Clear Final    Spec Gravity 05/16/2023 1.023  1.005 - 1.030 Final    Glucose Urine 05/16/2023 Normal  Normal mg/dL Final    Bilirubin Urine 05/16/2023 Negative Negative Final    Ketones Urine 05/16/2023 Negative  Negative mg/dL Final    Blood Urine 05/16/2023 Negative  Negative Final    pH Urine 05/16/2023 5.5  5.0 - 8.0 Final    Protein Urine 05/16/2023 Negative  Negative, Trace mg/dL Final    Urobilinogen Urine 05/16/2023 Normal  Normal Final    Nitrite Urine 05/16/2023 Negative  Negative Final    Leukocyte Esterase Urine 05/16/2023 Negative  Negative Final    WBC Urine 05/16/2023 1-5  0 - 5 /HPF Final    RBC Urine 05/16/2023 0-2  0 - 2 /HPF Final    Bacteria Urine 05/16/2023 None Seen  None Seen /HPF Final    Squamous Epi.  Cells 05/16/2023 Few (A)  None Seen /HPF Final    WBC 05/16/2023 4.2  4.0 - 11.0 x10(3) uL Final    RBC 05/16/2023 4.03  3.80 - 5.30 x10(6)uL Final    HGB 05/16/2023 12.0  12.0 - 16.0 g/dL Final    HCT 05/16/2023 37.8  35.0 - 48.0 % Final    MCV 05/16/2023 93.8  80.0 - 100.0 fL Final    MCH 05/16/2023 29.8  26.0 - 34.0 pg Final    MCHC 05/16/2023 31.7  31.0 - 37.0 g/dL Final    RDW-SD 05/16/2023 44.7  35.1 - 46.3 fL Final    RDW 05/16/2023 12.9  11.0 - 15.0 % Final    PLT 05/16/2023 127.0 (L)  150.0 - 450.0 10(3)uL Final    Neutrophil Absolute Prelim 05/16/2023 2.22  1.50 - 7.70 x10 (3) uL Final    Neutrophil Absolute 05/16/2023 2.22  1.50 - 7.70 x10(3) uL Final    Lymphocyte Absolute 05/16/2023 1.44  1.00 - 4.00 x10(3) uL Final    Monocyte Absolute 05/16/2023 0.41  0.10 - 1.00 x10(3) uL Final    Eosinophil Absolute 05/16/2023 0.09  0.00 - 0.70 x10(3) uL Final    Basophil Absolute 05/16/2023 0.03  0.00 - 0.20 x10(3) uL Final    Immature Granulocyte Absolute 05/16/2023 0.01  0.00 - 1.00 x10(3) uL Final    Neutrophil % 05/16/2023 52.9  % Final    Lymphocyte % 05/16/2023 34.3  % Final    Monocyte % 05/16/2023 9.8  % Final    Eosinophil % 05/16/2023 2.1  % Final    Basophil % 05/16/2023 0.7  % Final    Immature Granulocyte % 05/16/2023 0.2  % Final    ABO BLOOD TYPE 05/16/2023 O   Final    RH BLOOD TYPE 05/16/2023 Positive   Final    Antibody Screen 05/16/2023 Negative   Final    Ventricular rate 05/16/2023 57  BPM Final    Atrial rate 05/16/2023 57  BPM Final    P-R Interval 05/16/2023 182  ms Final    QRS Duration 05/16/2023 80  ms Final    Q-T Interval 05/16/2023 406  ms Final    QTC Calculation (Bezet) 05/16/2023 395  ms Final    P Axis 05/16/2023 61  degrees Final    R Axis 05/16/2023 51  degrees Final    T Axis 05/16/2023 46  degrees Final   ]      Radiology Imaging:  I reviewed with the patient her X-ray of the lumbar spine from 11 1/20/2030  XR LUMBAR SPINE AP LAT FLEX EXT (CPT=72110)  Narrative: PROCEDURE: XR LUMBAR SPINE AP LAT FLEX EXT EM (CPT=72120)     COMPARISON: MRI SPINE LUMBAR (W+WO) (CPT=72158), 11/10/2016, 6:26 AM.  [facility], X LUMBAR SPINE AP LAT, 6/14/2015, 2:23 PM.  [address] [place] [address] Dr Cardoso, X LUMBAR SPINE AP LAT FL EX, 8/23/2012, 3:40 PM.     INDICATIONS: Chronic lower back pain after sustaining a fall 3 weeks previously. TECHNIQUE: Lumbar spine radiographs, 4 views (AP, lateral, flexion, and extension views)       FINDINGS:    ALIGNMENT:   Dextroscoliosis of the lumbar spine is seen. The anatomic lumbar lordosis is preserved. VERTEBRAL BODIES:   A total of 5 non-rib-bearing lumbar-type vertebral bodies are identified. No fracture, subluxation, or bony lesion is visible. Multilevel anterior osteophyte formation is apparent. 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 Multilevel   facet arthrosis is seen at the lower lumbar levels. DISC SPACES: Preserved without significant space narrowing. SACROILIAC JOINTS: Unremarkable. FLEXION/EXTENSION: There is no evidence of provoked subluxation on dynamic views. OTHER: Surgical clips project over the right upper quadrant, likely from prior cholecystectomy. Impression: CONCLUSION:   1. Dextroscoliosis and multilevel degenerative changes of the lumbar spine with relative preservation of the intervertebral disc spaces.      2. No evidence of instability on dynamic views. 3. No radiographically visible acute osseous injury of the lumbar spine. elm-remote. Dictated by (CST): Minda Galeazzi, MD on 11/01/2023 at 10:28 PM       Finalized by (CST): Minda Galeazzi, MD on 11/01/2023 at 10:31 PM              ASSESSMENT AND PLAN:  Srini Barraza is a pleasant 50-year-old female who presents for follow-up with complaints of left low back pain and radiation down left leg. I have ordered x-rays of the lumbar spine. I have also recommended she restart sertraline. I have given her refills for this until she follows up with her primary care doctor to further refill this medication. I have also recommended she continue the Lyrica 100 mg twice per day. I have asked her to stop taking the diclofenac as she is having some gastritis. She can use Tylenol for pain. Follow-up with me in 8 weeks. If her symptoms persist, then I would recommend an MRI of the lumbar spine       RTC in 2 months  Discharge Instructions were provided as documented in AVS summary. The patient was in agreement with the assessment and plan. All questions were answered. There were no barriers to learning. Myalgia  (primary encounter diagnosis)  Facet syndrome, lumbar  Mechanical low back pain  Lumbar spondylosis  Lumbar foraminal stenosis  Bulge of lumbar disc without myelopathy  Lumbar radiculopathy  DDD (degenerative disc disease), lumbosacral  Acute pain of right knee  Chronic pain of both shoulders  Tendinopathy of rotator cuff, unspecified laterality  Lumbar trigger point syndrome  Biomechanical lesion    Adam Billings MD  Physical Medicine and Rehabilitation/Sports Medicine  [facility]

## 2023-11-16 NOTE — TELEPHONE ENCOUNTER
Spoke to pt's daughter Clora Sport) and daughter states pt has been complaining of itching in both eyes that has not gone away with drops given by pt's PCP. Daughter mentioned pt had an accident when she was a teenager where she got something in the eye and had to have it enucleated (pt wears a prosthetic and daughter thinks it's the right eye). Pt's last eye exam was over a year ago at [facility] and wears glasses. Suggested pt try OTC Pataday or Zaditor for itching in the meantime.   Scheduled pt on 12/13/23 @ 4:15pm with Kali and told daughter it was going to be a dilated eye exam.

## 2023-11-16 NOTE — TELEPHONE ENCOUNTER
Per daughter pt has been having eye itchiness and is turning a yellowish color, states pt only has one eye, asking if pt can be seen sooner than next available. Please call thank you.

## 2023-11-16 NOTE — ASSESSMENT & PLAN NOTE
Fill famotidine 20 mg twice a day   Do not take celebrex on empty stomach  Limit fried, acidic and spicy foods  Please call if symptoms worsen or are not resolving.

## 2023-11-20 NOTE — PATIENT INSTRUCTIONS
1) Continue Celebrex 200 mg daily  2) Tylenol 500-1000 mg every 6-8 hours as needed for pain. No more than 3000 mg daily. 3) Start physical therapy as soon as possible (Scheduled 12/1/23)  4) Please call and schedule your MRI at [phone]. Once you have your MRI scheduled, then call my office again to schedule a follow-up visit soon after your MRI so we may review the images together. 5) Follow up with me to review e the MRI images or in 8 weeks.  Whichever is sooner  6) Continue Lyrica

## 2023-12-08 NOTE — PROGRESS NOTES
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)  Mechanical low back pain (M54.59)  Lumbar spondylosis (M47.816)  Lumbar foraminal stenosis (M48.061)  Bulge of lumbar disc without myelopathy (M51.36)  Lumbar radiculopathy (M54.16)  DDD (degenerative disc disease), lumbosacral (M51.37)  Acute pain of right knee (M25.561)  Chronic pain of both shoulders (M25.511,G89.29,M25.512)  Tendinopathy of rotator cuff, unspecified laterality (M67.919)  Lumbar trigger point syndrome (M54.59)  Biomechanical lesion (M99.9)                  Authorized # of Visits:  10 visits on POC          Next MD visit: none   Fall Risk: standard         Precautions:  fall risk, demenia  Medication Changes since last visit?: No    Subjective: Reports she forgot how to do her exercises. States today she feels pretty good for but yesterday she had a lot of pain after doing get out of bed. Reports she does not have pain right now but she has pins and needles today. Reports she does get dizzy but denies fall from dizziness. States she feels \"unsure\" of her feet. Pain Ratin/10 VAS    Objective:      2023  Visit #   2   Manual Therapy    Therapeutic Exercise DKTC with exhalation    Adductor squeeze with exhale in supine    Hip abd/add with yellow Tband  With ER at the arms    Lumbar flexion with ball in sitting    Lumbar flexion in sitting (no ball)    Adductor squeeze in sittng   Therapeutic Activity Educated in centralization of symptoms and precautions for therapy    Education on need to decrease exercises if light headed    Education on shoe wear at home to decrease fall risk (Hebert Mir)   Neuromuscular Education    TNE Education    HEP DKTC with exhalation    Adductor squeeze with exhale in supine    Hip abd/add with yellow Tband  With ER at the arms         Assessment: No adverse effects to treatment. The pt displayed an improved gait pattern this date without longer step lengths. She was only given supine exercises for HEP secondary to fall risk and dementia. Educated the patient and her grand daughter about the need to appropriate shoe wear in the house to decrease fall risk. Goals: The pt was educated on the plan of care, purpose and individual goals for therapy, precautions for therapy. All questions were answered. 1.  The pt will be independent in their HEP. 2.  Centralization of symptoms to the lumbar spine. 3.  The pt will be independent in a modified workout program.    Frequency / Duration: Patient will be seen for 1-2 x/week or a total of 10 visits over a 90 day period. Treatment will include: Manual Therapy; Therapeutic Exercises; Neuromuscular Re-education; Therapeutic Activity; Patient education: Home exercise program instruction; TNE Education; Modalities as needed. Education or treatment limitation: Communication  Rehab Potential good    Certification From: 24/8/8970  To:2/29/2024        Charges: TE2 (28), TA1 (10)       Total Timed Treatment: 38 min  Total Treatment Time: 38 min          FOR REFERENCE ONLY   LUMBAR SPINE EVALUATION:   Referring Physician: Dr. 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)  Mechanical low back pain (M54.59)  Lumbar spondylosis (M47.816)  Lumbar foraminal stenosis (M48.061)  Bulge of lumbar disc without myelopathy (M51.36)  Lumbar radiculopathy (M54.16)  DDD (degenerative disc disease), lumbosacral (M51.37)  Acute pain of right knee (M25.561)  Chronic pain of both shoulders (M25.511,G89.29,M25.512)  Tendinopathy of rotator cuff, unspecified laterality (M67.919)  Lumbar trigger point syndrome (M54.59)  Biomechanical lesion (M99.9)     Date of Service: 12/1/2023   Date of Onset: over 1 year ago    PATIENT SUMMARY   [address] is a 69year old y/o female who presents to therapy today with complaints of L LE pain into the foot. States her pain is always there but varies in intensity.   States lying down is the best position for her but does not take all her pain way. States she had a R knee replacement 6 months ago and she still feels stiff in her R knee. Does fall but will deny it per the family. States her knee henry when she walks. Did break her arm during one fall  Lives alone. Does have stairs in the home. Has a care giver during the day. Does not drive,  Does cook daily. Her family lives a block away and checks on her often. History of current condition: had pain for more than a year, no incident that started the pain  . Has a pinching in the R knee s/p TKR 6 month ago. Pain Ratin/10 VAS without activity, 10/10 with activity    Current functional limitations include limited walking tolerance, limited standing tolerance,  . Saul Freire describes prior level of function needs some assist, has a caregiver during the day. Pt goals include decrease pain, improve her walking tolerance. Past medical history was reviewed with Saul Freire. Significant findings include .      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      Past Surgical History:   Procedure Laterality Date    BRAIN SURGERY  10/31/2016    Right frontal craniotomy for tumor resection    CHOLECYSTECTOMY  2012    COLONOSCOPY      at [facility] about 8 years ago    COLONOSCOPY N/A 2/15/2023    Procedure: COLONOSCOPY;  Surgeon: Ladonna Duenas MD;  Location: [address] [place] [address] ENDOSCOPY    HYSTERECTOMY      KNEE SURGERY Right 10/2022    OTHER SURGICAL HISTORY      Bionic Eye    OTHER SURGICAL HISTORY lymphoma biopsy    OTHER SURGICAL HISTORY      3 RT hand surgeries for broken fingers     TUBAL LIGATION         Are you being hurt, frightened, demeaned, or taken advantage of by anyone at your home or in your life? No     Have you recently had thoughts of hurting yourself? No    Have you tried to hurt yourself in the past?  No     The pt's grand daughter was presents during the entire evaluation to interpret. ASSESSMENT:   Shahnaz Fischer presents to therapy with a one year long hx of LBP and L LE pain that limits her ability to stand and walk. She displays some weakness in the L LE and is at high risk for falls. She would benefit from use of an assistive device but declines at this time. She did report some relief from RFIL/RFIS but pain quickly returned when she stood up. In addition, she displays decreased mobility of the R knee and L ankle and will benefit from mobility work for those joints. She will benefit from skilled PT to decrease her pain, improve her functional mobility and decrease her fall risk.     Precautions: Fall Risk, R TKA, fibromyalgia, dementia, RA   OBJECTIVE:   Observation/Posture: loss of lumbar lordosis, forward flexed posture  Gait: short step lengths with slightly ataxic gait pattern   ROM:     Trunk         Pain (+/-)   Flexion WFL                       Extension Limited 75% With pain   R Sidebend Limited 25%    L Sidebend Limited 25%    R Rotation NT    L Rotation NT    R Sideglide NT    L Sideglide NT      Repeated Motion Testing: RFIS/RFIL - decreased/better    STRENGTH:   5/5 MMT Scale   Left  Right Comments   Hip Flexion (L2) 4    5    Knee Extension (L3) 5 4    Knee Flexion 5 5    Ankle DF (L4) 4 5    EHL (L5) 4 5    Ankle PF (S1) 5 5    Hip Abduction NT NT    Hip Extension NT NT      Flexibility:      R L   Hamstrings short short   Piriformis WFL WFL   Hip Flexor short short     LE ROM:    R L   Knee Flex 110 130   Knee Ext -5 0     Palpation: TTP over the R knee  Neuro Screen: weakness with heel walking and toe walking L  Special Tests: (-) SLR/Slump bilaterally    Outcome Surverys  Oswestry Disability Index Score  No data recorded      :

## 2023-12-13 NOTE — ASSESSMENT & PLAN NOTE
Mild cataract in right eye; no treatment needed at this time. New glasses today; update as needed. Discussed that new prescription is only a slight change.

## 2023-12-13 NOTE — PATIENT INSTRUCTIONS
Eye globe prosthesis  Discussed that patient should see an  for the left eye prothesis. Itch of eye  Use over the counter Alaway or Zaditor twice daily as needed for itching/ allergy symptoms. Info given. Patient could also try over the counter Pataday once to twice per day depending on package instructions. Meibomian gland dysfunction (MGD) of both eyes  Patient was instructed to use warm compresses to the eyelids twice a day everyday. Instructions for warm compress use:   Patient should place wash compresses on both eyelids for 5 minutes every morning and every night. After 5 minutes of holding the warm compresses on the eyelids, patient should gently rub the eyelashes and then rinse thoroughly with warm water. Age-related nuclear cataract of right eye  Mild cataract in right eye; no treatment needed at this time. New glasses today; update as needed. Discussed that new prescription is only a slight change.

## 2023-12-13 NOTE — ASSESSMENT & PLAN NOTE
Use over the counter Alaway or Zaditor twice daily as needed for itching/ allergy symptoms. Info given. Patient could also try over the counter Pataday once to twice per day depending on package instructions.

## 2023-12-13 NOTE — PROGRESS NOTES
Vivian Rodriguez is a 69year old female. HPI:     HPI    New patient here for a complete exam per Karolynn Claude APRN. Patient complains of occasional itching OU. She also complains blurry vision and difficulty reading small print. Patient feels pain around her prosthesis and scratching behind it. She has not seen an ocularists for about 14 years. She feels like she may need a new prosthesis. Her last eye exam was about 2 years ago. Patient has a prosthesis OS due a work injury 50 years ago. Last edited by Karolyn Ling OT on 12/13/2023  4:51 PM.        Patient History:  Past Medical History:   Diagnosis Date    Arthritis     Rheumatoid Arthritis    Cancer ([facility] [address]     lymphoma head    Dementia ([address]     Depression     Disorder of thyroid     Esophageal reflux     Eye injury 1973    pt lost her left eye due to an injury at work, prosthesis OS    Fibromyalgia     Hearing difficulty     Hearing impairment     hearing aids left    High blood pressure     Hyperlipidemia     Hypertension     Osteoporosis     Osteoporosis     TIA (transient ischemic attack)     Unspecified essential hypertension     Visual impairment     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 about 8 years ago); Brain Surgery (10/31/2016) (Right frontal craniotomy for tumor resection); other surgical history (Bionic Eye); other surgical history (lymphoma biopsy); other surgical history (3 RT hand surgeries for broken fingers ); tubal ligation; hysterectomy; knee surgery (Right, 10/2022); and colonoscopy (N/A, 2/15/2023) (Procedure: COLONOSCOPY;  Surgeon: Lisette Rizo MD;  Location: [address] [place] [address] ENDOSCOPY).     Family History   Problem Relation Age of Onset    Cancer Mother         uterine cancer    Arthritis Father         Rheumatoid    Other (Other) Maternal Grandmother     Other (Other) Maternal Grandfather Other (Other) Paternal Grandmother     Other (Other) Paternal Grandfather     Cancer Cousin         breast cancer    Diabetes Neg     Glaucoma Neg     Macular degeneration Neg        Social History:   Social History     Socioeconomic History    Marital status:    Tobacco Use    Smoking status: Never     Passive exposure: Never    Smokeless tobacco: Never   Vaping Use    Vaping Use: Never used   Substance and Sexual Activity    Alcohol use: Not Currently     Alcohol/week: 0.0 standard drinks of alcohol    Drug use: No   Other Topics Concern    Caffeine Concern No     Comment: Coffee 1 cup daily    Sleep Concern No    Exercise Yes   Social History Narrative    The patient does not use an assistive device. .      The patient does live in a home with stairs. Medications:  Current Outpatient Medications   Medication Sig Dispense Refill    alendronate 70 MG Oral Tab Take 1 tablet (70 mg total) by mouth every 7 days. 12 tablet 3    cinacalcet 30 MG Oral Tab Take 0.5 tablets (15 mg total) by mouth daily. 45 tablet 3    donepezil 10 MG Oral Tab Take 1 tablet (10 mg total) by mouth every evening. 90 tablet 3    famotidine 20 MG Oral Tab Take 1 tablet (20 mg total) by mouth 2 (two) times daily. 90 tablet 3    lisinopril 20 MG Oral Tab Take 1 tablet (20 mg total) by mouth daily. 90 tablet 3    pregabalin 100 MG Oral Cap Take 1 capsule (100 mg total) by mouth 2 (two) times daily. 60 capsule 11    sertraline 100 MG Oral Tab Take 1 tablet (100 mg total) by mouth nightly. 90 tablet 3    diclofenac 50 MG Oral Tab EC Take 1 tablet (50 mg total) by mouth 2 (two) times daily as needed. 180 tablet 0    celecoxib 200 MG Oral Cap Take 1 capsule (200 mg total) by mouth 2 (two) times daily. 30 capsule 1    ZINC OR Use as directed in the mouth or throat. Allergies:   Allergies   Allergen Reactions    Dust Mite Extract Runny nose    Latex RASH     itching    Latex ITCHING     If exposed for long time     Seasonal UNKNOWN ROS:     ROS    Positive for: Eyes  Negative for: Constitutional, Gastrointestinal, Neurological, Skin, Genitourinary, Musculoskeletal, HENT, Endocrine, Cardiovascular, Respiratory, Psychiatric, Allergic/Imm, Heme/Lymph  Last edited by Javed Olivier OT on 12/13/2023  4:30 PM.          PHYSICAL EXAM:     Base Eye Exam       Visual Acuity (Snellen - Linear)         Right Left    Dist cc 20/25 prosthesis    Near cc 20/20       Correction: Glasses              Tonometry (Icare, 4:46 PM)         Right Left    Pressure 10 X              Pupils    OD PERRL  OS prosthesis             Visual Fields         Left Right      Full    Restrictions Total superior temporal, inferior temporal, superior nasal, inferior nasal deficiencies               Neuro/Psych       Oriented x3:  Yes              Dilation       Right eye: 1.0% Mydriacyl and 2.5% Sylvester Synephrine @ 4:46 PM              Dilation #2       Right eye: 1.0% Mydriacyl and 2.5% Sylvester Synephrine @ 4:46 PM                  Slit Lamp and Fundus Exam       Slit Lamp Exam         Right Left    Lids/Lashes Dermatochalasis, Meibomian gland dysfunction Dermatochalasis, Meibomian gland dysfunction, matted eyelashes    Conjunctiva/Sclera Non-injected, no papillae or follicles prothesis in place, mucous on prothesis    Cornea 1+ Arcus x    Anterior Chamber Deep and quiet x    Iris Normal x    Lens 1+ Nuclear sclerosis, Nasal Trace Cortical cataract x    Vitreous Clear x              Fundus Exam         Right Left    Disc Good rim-difficult exam x    C/D Ratio 0.3 x    Macula Normal x    Vessels Normal x    Periphery Normal x                  Refraction       Wearing Rx         Sphere Cylinder Axis Add    Right -0.50 +1.00 040 +3.00    Left -0.50 +1.00 040 +3.00              Manifest Refraction         Sphere Cylinder [place] Dist [place] Add Near [place]    Right -0.50 +1.00 040 20/25+ +3.00 20/20    Left Balance                   Final Rx         Sphere Cylinder [place] Dist [place] Add [place] Right -0.50 +1.00 040 20/25+ +3.00 20/20    Left Balance           Type: Flat top bifocal                     ASSESSMENT/PLAN:     Diagnoses and Plan:     Eye globe prosthesis  Discussed that patient should see an  for the left eye prothesis. Itch of eye  Use over the counter Alaway or Zaditor twice daily as needed for itching/ allergy symptoms. Info given. Patient could also try over the counter Pataday once to twice per day depending on package instructions. Meibomian gland dysfunction (MGD) of both eyes  Patient was instructed to use warm compresses to the eyelids twice a day everyday. Instructions for warm compress use:   Patient should place wash compresses on both eyelids for 5 minutes every morning and every night. After 5 minutes of holding the warm compresses on the eyelids, patient should gently rub the eyelashes and then rinse thoroughly with warm water. Age-related nuclear cataract of right eye  Mild cataract in right eye; no treatment needed at this time. New glasses today; update as needed. Discussed that new prescription is only a slight change. No orders of the defined types were placed in this encounter.       Meds This Visit:  Requested Prescriptions      No prescriptions requested or ordered in this encounter        Follow up instructions:  Return in about 1 year (around 12/13/2024) for Complete eye exam.    12/13/2023  Scribed by: Chika George MD

## 2023-12-15 NOTE — PROGRESS NOTES
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)  Mechanical low back pain (M54.59)  Lumbar spondylosis (M47.816)  Lumbar foraminal stenosis (M48.061)  Bulge of lumbar disc without myelopathy (M51.36)  Lumbar radiculopathy (M54.16)  DDD (degenerative disc disease), lumbosacral (M51.37)  Acute pain of right knee (M25.561)  Chronic pain of both shoulders (M25.511,G89.29,M25.512)  Tendinopathy of rotator cuff, unspecified laterality (M67.919)  Lumbar trigger point syndrome (M54.59)  Biomechanical lesion (M99.9)                  Authorized # of Visits:  10 visits on POC          Next MD visit: none   Fall Risk: standard         Precautions:  fall risk, demenia  Medication Changes since last visit?: No    Subjective: Reports she state her pain is less but still has some pins and needles down her L LE. States she gets those symptoms when she is standing. Pain Ratin/10 VAS    Objective:      2023  Visit #   2 12/15/2023  Visit #3   Manual Therapy     Therapeutic Exercise DKTC with exhalation    Adductor squeeze with exhale in supine    Hip abd/add with yellow Tband  With ER at the arms    Lumbar flexion with ball in sitting    Lumbar flexion in sitting (no ball)    Adductor squeeze in sittng Adductor squeeze with exhale in supine    Hip abd/add with yellow Tband  With ER at the arms    IO/TA isometrics in supine hooklying             Therapeutic Activity Educated in centralization of symptoms and precautions for therapy    Education on need to decrease exercises if light headed    Education on shoe wear at home to decrease fall risk ([facility])    Neuromuscular Education  Standing L glut push    L stance with R hip flexion - unable to perform   TNE Education     HEP DKTC with exhalation    Adductor squeeze with exhale in supine    Hip abd/add with yellow Tband  With ER at the arms Standing L glut push         Assessment: No adverse effects to treatment.   The pt reported less pain overall but continues to have tingling in the L LE in standing. Given an updated HEP with the above stated activity. Goals: The pt was educated on the plan of care, purpose and individual goals for therapy, precautions for therapy. All questions were answered. 1.  The pt will be independent in their HEP. 2.  Centralization of symptoms to the lumbar spine. 3.  The pt will be independent in a modified workout program.    Frequency / Duration: Patient will be seen for 1-2 x/week or a total of 10 visits over a 90 day period. Treatment will include: Manual Therapy; Therapeutic Exercises; Neuromuscular Re-education; Therapeutic Activity; Patient education: Home exercise program instruction; TNE Education; Modalities as needed. Education or treatment limitation: Communication  Rehab Potential good    Certification From: 02/4/1296  To:2/29/2024        Charges: TE2 (28), NM1 (10)       Total Timed Treatment: 38 min  Total Treatment Time: 38 min          FOR REFERENCE ONLY   LUMBAR SPINE EVALUATION:   Referring Physician: Dr. Rizvi   Diagnosis:  Myalgia (M79.10)  Facet syndrome, lumbar (M47.816)  Mechanical low back pain (M54.59)  Lumbar spondylosis (M47.816)  Lumbar foraminal stenosis (M48.061)  Bulge of lumbar disc without myelopathy (M51.36)  Lumbar radiculopathy (M54.16)  DDD (degenerative disc disease), lumbosacral (M51.37)  Acute pain of right knee (M25.561)  Chronic pain of both shoulders (M25.511,G89.29,M25.512)  Tendinopathy of rotator cuff, unspecified laterality (M67.919)  Lumbar trigger point syndrome (M54.59)  Biomechanical lesion (M99.9)     Date of Service: 12/1/2023   Date of Onset: over 1 year ago    PATIENT SUMMARY   [address] is a 69year old y/o female who presents to therapy today with complaints of L LE pain into the foot. States her pain is always there but varies in intensity.   States lying down is the best position for her but does not take all her pain way. States she had a R knee replacement 6 months ago and she still feels stiff in her R knee. Does fall but will deny it per the family. States her knee henry when she walks. Did break her arm during one fall  Lives alone. Does have stairs in the home. Has a care giver during the day. Does not drive,  Does cook daily. Her family lives a block away and checks on her often. History of current condition: had pain for more than a year, no incident that started the pain  . Has a pinching in the R knee s/p TKR 6 month ago. Pain Ratin/10 VAS without activity, 10/10 with activity    Current functional limitations include limited walking tolerance, limited standing tolerance,  . Meri Hinds describes prior level of function needs some assist, has a caregiver during the day. Pt goals include decrease pain, improve her walking tolerance. Past medical history was reviewed with Meri Hinds. Significant findings include .      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 about 8 years ago    COLONOSCOPY N/A 2/15/2023    Procedure: COLONOSCOPY;  Surgeon: Lesly Hurd MD;  Location: [facility] ENDOSCOPY    HYSTERECTOMY      KNEE SURGERY Right 10/2022    OTHER SURGICAL HISTORY      Bionic Eye    OTHER SURGICAL HISTORY      lymphoma biopsy    OTHER SURGICAL HISTORY      3 RT hand surgeries for broken fingers     TUBAL LIGATION         Are you being hurt, frightened, demeaned, or taken advantage of by anyone at your home or in your life? No     Have you recently had thoughts of hurting yourself? No    Have you tried to hurt yourself in the past?  No     The pt's grand daughter was presents during the entire evaluation to interpret. ASSESSMENT:   Natalee Desai presents to therapy with a one year long hx of LBP and L LE pain that limits her ability to stand and walk. She displays some weakness in the L LE and is at high risk for falls. She would benefit from use of an assistive device but declines at this time. She did report some relief from RFIL/RFIS but pain quickly returned when she stood up. In addition, she displays decreased mobility of the R knee and L ankle and will benefit from mobility work for those joints. She will benefit from skilled PT to decrease her pain, improve her functional mobility and decrease her fall risk.     Precautions: Fall Risk, R TKA, fibromyalgia, dementia, RA   OBJECTIVE:   Observation/Posture: loss of lumbar lordosis, forward flexed posture  Gait: short step lengths with slightly ataxic gait pattern   ROM:     Trunk         Pain (+/-)   Flexion WFL                       Extension Limited 75% With pain   R Sidebend Limited 25%    L Sidebend Limited 25%    R Rotation NT    L Rotation NT    R Sideglide NT    L Sideglide NT      Repeated Motion Testing: RFIS/RFIL - decreased/better    STRENGTH:   5/5 MMT Scale   Left  Right Comments   Hip Flexion (L2) 4    5    Knee Extension (L3) 5 4    Knee Flexion 5 5    Ankle DF (L4) 4 5    EHL (L5) 4 5    Ankle PF (S1) 5 5    Hip Abduction NT NT    Hip Extension NT NT      Flexibility:      R L   Hamstrings short short   Piriformis [facility] WFL   Hip Flexor short short     LE ROM:    R L   Knee Flex 110 130   Knee Ext -5 0     Palpation: TTP over the R knee  Neuro Screen: weakness with heel walking and toe walking L  Special Tests: (-) SLR/Slump bilaterally    Outcome Surverys  Oswestry Disability Index Score  No data recorded      :

## 2023-12-28 NOTE — PROGRESS NOTES
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)  Mechanical low back pain (M54.59)  Lumbar spondylosis (M47.816)  Lumbar foraminal stenosis (M48.061)  Bulge of lumbar disc without myelopathy (M51.36)  Lumbar radiculopathy (M54.16)  DDD (degenerative disc disease), lumbosacral (M51.37)  Acute pain of right knee (M25.561)  Chronic pain of both shoulders (M25.511,G89.29,M25.512)  Tendinopathy of rotator cuff, unspecified laterality (M67.919)  Lumbar trigger point syndrome (M54.59)  Biomechanical lesion (M99.9)                  Authorized # of Visits:  10 visits on POC          Next MD visit: none   Fall Risk: standard         Precautions:  fall risk, demenia  Medication Changes since last visit?: No    Subjective: Reports she doesn't have as much pain as she had before. States she mostly has numbness in weight bearing positions. Reports her leg is 75% better. States she will get pain she walks a lot.       Pain Ratin/10 VAS    Objective:      2023  Visit #   2 12/15/2023  Visit #3 2023  Visit #4   Manual Therapy   Manual neutral flossing    L LE ankle joint mobs       Therapeutic Exercise DKTC with exhalation    Adductor squeeze with exhale in supine    Hip abd/add with yellow Tband  With ER at the arms    Lumbar flexion with ball in sitting    Lumbar flexion in sitting (no ball)    Adductor squeeze in sittng Adductor squeeze with exhale in supine    Hip abd/add with yellow Tband  With ER at the arms    IO/TA isometrics in supine hooklying           Self flossing in sitting    Adductor squeeze with exhale in supine    Hip abd/add with yellow Tband  With ER at the arms    IO/TA isometrics in supine hooklying   Therapeutic Activity Educated in centralization of symptoms and precautions for therapy    Education on need to decrease exercises if light headed    Education on shoe wear at home to decrease fall risk (Hebert Mir)     Neuromuscular Education Standing L glut push    L stance with R hip flexion - unable to perform    TNE Education      HEP DKTC with exhalation    Adductor squeeze with exhale in supine    Hip abd/add with yellow Tband  With ER at the arms Standing L glut push Self flossing in sitting         Assessment: No adverse effects to treatment. Less pain overall but numbness persists in WB. Added manual neutral flossing to decrease LE symptoms and given self neutral flossing for HEP. Goals: The pt was educated on the plan of care, purpose and individual goals for therapy, precautions for therapy. All questions were answered. 1.  The pt will be independent in their HEP. 2.  Centralization of symptoms to the lumbar spine. 3.  The pt will be independent in a modified workout program.    Frequency / Duration: Patient will be seen for 1-2 x/week or a total of 10 visits over a 90 day period. Treatment will include: Manual Therapy; Therapeutic Exercises; Neuromuscular Re-education; Therapeutic Activity; Patient education: Home exercise program instruction; TNE Education; Modalities as needed.     Education or treatment limitation: Communication  Rehab Potential good    Certification From: 92/9/9148  To:2/29/2024        Charges: TE2 (28), Man1 (10)       Total Timed Treatment: 38 min  Total Treatment Time: 38 min          FOR REFERENCE ONLY   LUMBAR SPINE EVALUATION:   Referring Physician: Dr. Cassandra Chacon  Diagnosis:  Myalgia (M79.10)  Facet syndrome, lumbar (M47.816)  Mechanical low back pain (M54.59)  Lumbar spondylosis (M47.816)  Lumbar foraminal stenosis (M48.061)  Bulge of lumbar disc without myelopathy (M51.36)  Lumbar radiculopathy (M54.16)  DDD (degenerative disc disease), lumbosacral (M51.37)  Acute pain of right knee (M25.561)  Chronic pain of both shoulders (M25.511,G89.29,M25.512)  Tendinopathy of rotator cuff, unspecified laterality (M67.919)  Lumbar trigger point syndrome (M54.59)  Biomechanical lesion (M99.9)     Date of Service: 2023   Date of Onset: over 1 year ago    PATIENT SUMMARY   Blanca Duque is a 69year old y/o female who presents to therapy today with complaints of L LE pain into the foot. States her pain is always there but varies in intensity. States lying down is the best position for her but does not take all her pain way. States she had a R knee replacement 6 months ago and she still feels stiff in her R knee. Does fall but will deny it per the family. States her knee henry when she walks. Did break her arm during one fall  Lives alone. Does have stairs in the home. Has a care giver during the day. Does not drive,  Does cook daily. Her family lives a block away and checks on her often. History of current condition: had pain for more than a year, no incident that started the pain  . Has a pinching in the R knee s/p TKR 6 month ago. Pain Ratin/10 VAS without activity, 10/10 with activity    Current functional limitations include limited walking tolerance, limited standing tolerance,  . Nicole Mack describes prior level of function needs some assist, has a caregiver during the day. Pt goals include decrease pain, improve her walking tolerance. Past medical history was reviewed with Nicole Mack. Significant findings include .      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      Past Surgical History:   Procedure Laterality Date    BRAIN SURGERY  10/31/2016    Right frontal craniotomy for tumor resection    CHOLECYSTECTOMY  2012    COLONOSCOPY      at [facility] about 8 years ago    COLONOSCOPY N/A 2/15/2023    Procedure: COLONOSCOPY;  Surgeon: Betsy Chester MD;  Location: [facility] ENDOSCOPY    HYSTERECTOMY      KNEE SURGERY Right 10/2022    OTHER SURGICAL HISTORY      Bionic Eye    OTHER SURGICAL HISTORY      lymphoma biopsy    OTHER SURGICAL HISTORY      3 RT hand surgeries for broken fingers     TUBAL LIGATION         Are you being hurt, frightened, demeaned, or taken advantage of by anyone at your home or in your life? No     Have you recently had thoughts of hurting yourself? No    Have you tried to hurt yourself in the past?  No     The pt's grand daughter was presents during the entire evaluation to interpret. ASSESSMENT:   Gladis Zheng presents to therapy with a one year long hx of LBP and L LE pain that limits her ability to stand and walk. She displays some weakness in the L LE and is at high risk for falls. She would benefit from use of an assistive device but declines at this time. She did report some relief from RFIL/RFIS but pain quickly returned when she stood up. In addition, she displays decreased mobility of the R knee and L ankle and will benefit from mobility work for those joints. She will benefit from skilled PT to decrease her pain, improve her functional mobility and decrease her fall risk.     Precautions: Fall Risk, R TKA, fibromyalgia, dementia, RA   OBJECTIVE:   Observation/Posture: loss of lumbar lordosis, forward flexed posture  Gait: short step lengths with slightly ataxic gait pattern   ROM:     Trunk         Pain (+/-)   Flexion WFL                       Extension Limited 75% With pain   R Sidebend Limited 25%    L Sidebend Limited 25%    R Rotation NT    L Rotation NT    R Sideglide NT    L Sideglide NT      Repeated Motion Testing: RFIS/RFIL - decreased/better    STRENGTH:   5/5 MMT Scale   Left  Right Comments   Hip Flexion (L2) 4    5 Knee Extension (L3) 5 4    Knee Flexion 5 5    Ankle DF (L4) 4 5    EHL (L5) 4 5    Ankle PF (S1) 5 5    Hip Abduction NT NT    Hip Extension NT NT      Flexibility:      R L   Hamstrings short short   Piriformis WFL WFL   Hip Flexor short short     LE ROM:    R L   Knee Flex 110 130   Knee Ext -5 0     Palpation: TTP over the R knee  Neuro Screen: weakness with heel walking and toe walking L  Special Tests: (-) SLR/Slump bilaterally    Outcome Surverys  Oswestry Disability Index Score  No data recorded      :

## 2024-01-02 NOTE — PROGRESS NOTES
2024  Patient Name: Lizbeth Matute  YOB: 1945          MRN number:  [id number]  Referring Physician:  Alex Behar    Progress Summary    Dx:   Diagnosis:  Myalgia (M79.10)  Facet syndrome, lumbar (M47.816)  Mechanical low back pain (M54.59)  Lumbar spondylosis (M47.816)  Lumbar foraminal stenosis (M48.061)  Bulge of lumbar disc without myelopathy (M51.36)  Lumbar radiculopathy (M54.16)  DDD (degenerative disc disease), lumbosacral (M51.37)  Acute pain of right knee (M25.561)  Chronic pain of both shoulders (M25.511,G89.29,M25.512)  Tendinopathy of rotator cuff, unspecified laterality (M67.919)  Lumbar trigger point syndrome (M54.59)  Biomechanical lesion (M99.9)                  Authorized # of Visits:  10 visits on POC          Next MD visit: none   Fall Risk: standard         Precautions:  fall risk, demenia  Medication Changes since last visit?: No    Subjective: Reports she doesn't have as much pain as she had before. States she mostly has numbness in weight bearing positions.  Reports her leg is 75% better.  States she has been doing her HEP daily but was doing her exercises on the R LE instead of the L in standing.      Pain Ratin/10 VAS    Objective:     ROM:     Trunk         Pain (+/-)   Flexion WFL                       Extension Limited 25% With pain   R Sidebend WFL    L Sidebend WFL    R Rotation NT    L Rotation NT    R Sideglide NT    L Sideglide NT      Repeated Motion Testing: RFIS/RFIL - decreased/better    STRENGTH:   5/5 MMT Scale   Left  Right Comments   Hip Flexion (L2) 4+ 5    Knee Extension (L3) 5 4    Knee Flexion 5 5    Ankle DF (L4) 4+ 5    EHL (L5) 4+ 5    Ankle PF (S1) 5 5    Hip Abduction NT NT    Hip Extension NT NT      Flexibility:      R L   Hamstrings short short   Piriformis WFL WFL   Hip Flexor short short     LE ROM:    R L   Knee Flex 120 130   Knee Ext -5 0     Palpation: TTP over the R knee  Neuro Screen: weakness with heel walking and toe  walking L  Special Tests: (-) SLR/Slump bilaterally     12/1/2023  Visit #   2 12/15/2023  Visit #3 12/28/2023  Visit #4 1/2/2024  Visit #5   Manual Therapy   Manual neutral flossing    L LE ankle joint mobs     Manual neutral flossing bilateral LE's    L LE ankle joint mobs    R knee extension mobs in supine   Therapeutic Exercise DKTC with exhalation    Adductor squeeze with exhale in supine    Hip abd/add with yellow Tband  With ER at the arms    Lumbar flexion with ball in sitting    Lumbar flexion in sitting (no ball)    Adductor squeeze in sittng Adductor squeeze with exhale in supine    Hip abd/add with yellow Tband  With ER at the arms    IO/TA isometrics in supine hooklying           Self flossing in sitting    Adductor squeeze with exhale in supine    Hip abd/add with yellow Tband  With ER at the arms    IO/TA isometrics in supine hooklying Adductor squeeze with exhalation     LAQ with abdominal brace    Marching with abdominal brace    Quads sets R with heel prop   Therapeutic Activity Educated in centralization of symptoms and precautions for therapy    Education on need to decrease exercises if light headed    Education on shoe wear at home to decrease fall risk ([facility])      Neuromuscular Education   Standing L glut push    L stance with R hip flexion - unable to perform Standing L glut push    L stance with R hip flexion     Sit to stand with breathing   TNE Education       HEP DKTC with exhalation    Adductor squeeze with exhale in supine    Hip abd/add with yellow Tband  With ER at the arms Standing L glut push Self flossing in sitting Continue with current HEP         Assessment:   Lizbeth Walker has completed 5 visits of PT and has progressed well.  She no longer has pain down her L LE but continues to c/o numbness upon standing.  States 75% improvement overall and has been doing her HEP regularly.  She continues to need further IO/TA recruitment to further decrease her symptoms.   Recommend continued PT x 2 visits and then plan to DC if her symptoms remain minimal.     Goals:      The pt was educated on the plan of care, purpose and individual goals for therapy, precautions for therapy.  All questions were answered.     1.  The pt will be independent in their HEP.  MET 1/2/2024  2.  Centralization of symptoms to the lumbar spine.  PROGRESSING 1/2/2024  3.  The pt will be independent in a modified workout program.  PROGRESSING 1/2/2024      Frequency / Duration: Patient will be seen for 1-2 x/week or a total of 2 visits over a 90 day period.  Treatment will include: Manual Therapy; Therapeutic Exercises; Neuromuscular Re-education; Therapeutic Activity;  Patient education: Home exercise program instruction; TNE Education; Modalities as needed.    Education or treatment limitation: Communication  Rehab Potential good    Charges: TE1 (15), Man1 (10), NM1 (8)      Total Timed Treatment: 38 min  Total Treatment Time: 38 min     Patient was advised of these findings, precautions, and treatment options and has agreed to actively participate in planning and for this course of care.    Thank you for your referral. Please co-sign or sign and return this letter via fax as soon as possible to [phone]. If you have any questions, please contact me at Dept: [phone].    Sincerely,  DAMASO MRAQUEZ PT    Electronically signed by therapist: DAMASO MARQUEZ PT    Physician's certification required: Yes  I certify the need for these services furnished under this plan of treatment and while under my care.    X___________________________________________________ Date____________________    Certification From: 1/2/2024  To:4/1/2024

## 2024-01-03 NOTE — PROGRESS NOTES
[facility]  Progress Note    CHIEF COMPLAINT:    Chief Complaint   Patient presents with    Follow - Up     LOV: 11/20/2023 pt comes in to f/u on MRI of L-spine. Presents with bilateral low back aching pain. Denies weakness. Admits T/N in L lower leg. Rates pain 0/10 now. Takes diclofenac 50mg BID, pregabalin 100mg BID. Currently in PT.       History of Present Illness:  The patient is a 78 year old female with a significant past medical history of hypertension, hyperlipidemia, TIA, dementia, and lymphoma who presents with complaints of left leg pain associated with numbness and tingling into the dorsum of the right foot and plantar surface of the right foot.  She had an MRI of the lumbar spine performed which independently reviewed.  She currently rates her pain a 0 out of 10 but can be as high as a 4-5 out of 10 especially when she is not taking diclofenac.  She was off of diclofenac for a couple of weeks and her pain was as high as an 8 out of 10.  She associates this with paresthesias in the foot.  She has completed about 4-5 sessions of physical therapy.  She is also complaining of right shoulder pain when she is laying on the right side.  She has difficulty raising the right arm above her head due to pain.  She rates that about a 6 out of 10.    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        SURGICAL HISTORY:  Past  Surgical History:   Procedure Laterality Date    BRAIN SURGERY  10/31/2016    Right frontal craniotomy for tumor resection    CHOLECYSTECTOMY  2012    COLONOSCOPY      at [facility] about 8 years ago    COLONOSCOPY N/A 2/15/2023    Procedure: COLONOSCOPY;  Surgeon: Radha Sr MD;  Location: [facility] ENDOSCOPY    HYSTERECTOMY      KNEE SURGERY Right 10/2022    OTHER SURGICAL HISTORY      Bionic Eye    OTHER SURGICAL HISTORY      lymphoma biopsy    OTHER SURGICAL HISTORY      3 RT hand surgeries for broken fingers     TUBAL LIGATION         SOCIAL HISTORY:   Social History     Occupational History    Not on file   Tobacco Use    Smoking status: Never     Passive exposure: Never    Smokeless tobacco: Never   Vaping Use    Vaping Use: Never used   Substance and Sexual Activity    Alcohol use: Not Currently     Alcohol/week: 0.0 standard drinks of alcohol    Drug use: No    Sexual activity: Not on file       FAMILY HISTORY:   Family History   Problem Relation Age of Onset    Cancer Mother         uterine cancer    Arthritis Father         Rheumatoid    Other (Other) Maternal Grandmother     Other (Other) Maternal Grandfather     Other (Other) Paternal Grandmother     Other (Other) Paternal Grandfather     Cancer Cousin         breast cancer    Diabetes Neg     Glaucoma Neg     Macular degeneration Neg        CURRENT MEDICATIONS:   Current Outpatient Medications   Medication Sig Dispense Refill    alendronate 70 MG Oral Tab Take 1 tablet (70 mg total) by mouth every 7 days. 12 tablet 3    cinacalcet 30 MG Oral Tab Take 0.5 tablets (15 mg total) by mouth daily. 45 tablet 3    donepezil 10 MG Oral Tab Take 1 tablet (10 mg total) by mouth every evening. 90 tablet 3    famotidine 20 MG Oral Tab Take 1 tablet (20 mg total) by mouth 2 (two) times daily. 90 tablet 3    lisinopril 20 MG Oral Tab Take 1 tablet (20 mg total) by mouth daily. 90 tablet 3    pregabalin 100 MG Oral Cap Take 1 capsule (100 mg total) by mouth 2  (two) times daily. 60 capsule 11    sertraline 100 MG Oral Tab Take 1 tablet (100 mg total) by mouth nightly. 90 tablet 3    diclofenac 50 MG Oral Tab EC Take 1 tablet (50 mg total) by mouth 2 (two) times daily as needed. 180 tablet 0    celecoxib 200 MG Oral Cap Take 1 capsule (200 mg total) by mouth 2 (two) times daily. 30 capsule 1    ZINC OR Use as directed in the mouth or throat.         ALLERGIES:   Allergies   Allergen Reactions    Dust Mite Extract Runny nose    Latex RASH     itching    Latex ITCHING     If exposed for long time     Seasonal UNKNOWN       REVIEW OF SYSTEMS:   Review of Systems   Constitutional: Negative.    HENT: Negative.    Eyes: Negative.    Respiratory: Negative.    Cardiovascular: Negative.    Gastrointestinal: Negative.    Genitourinary: Negative.    Musculoskeletal: As per HPI  Skin: Negative.    Neurological: As per HPI  Endo/Heme/Allergies: Negative.    Psychiatric/Behavioral: Negative.      All other systems reviewed and are negative. Pertinent positives and negatives noted in the HPI.        PHYSICAL EXAM:   Ht 57\"   Wt 132 lb (59.9 kg)   BMI 28.56 kg/m²     Body mass index is 28.56 kg/m².      General: No immediate distress  Head: Normocephalic/ Atraumatic  Eyes: Extra-occular movements intact.   Ears: No auricular hematoma or deformities  Mouth: No lesions or ulcerations  Heart: peripheral pulses intact. Normal capillary refill.   Lungs: Non-labored respirations  Abdomen: No abdominal guarding  Extremities: No lower extremity edema bilaterally   Skin: No lesions noted.   Cognition: alert & oriented x 3, attentive, able to follow 2 step commands, comprehention intact, spontaneous speech intact  Motor:    Musculoskeletal:      Data  [facility] Lab Encounter on 12/12/2023   Component Date Value Ref Range Status    Glucose 12/12/2023 81  70 - 99 mg/dL Final    Sodium 12/12/2023 136  136 - 145 mmol/L Final    Potassium 12/12/2023 5.0  3.5 - 5.1 mmol/L Final    Chloride 12/12/2023 106  98  - 112 mmol/L Final    CO2 12/12/2023 27.0  21.0 - 32.0 mmol/L Final    Anion Gap 12/12/2023 3  0 - 18 mmol/L Final    BUN 12/12/2023 33 (H)  9 - 23 mg/dL Final    Creatinine 12/12/2023 1.04 (H)  0.55 - 1.02 mg/dL Final    BUN/CREA Ratio 12/12/2023 31.7 (H)  10.0 - 20.0 Final    Calcium, Total 12/12/2023 10.1  8.7 - 10.4 mg/dL Final    Calculated Osmolality 12/12/2023 288  275 - 295 mOsm/kg Final    eGFR-Cr 12/12/2023 55 (L)  >=60 mL/min/1.73m2 Final    ALT 12/12/2023 10  10 - 49 U/L Final    AST 12/12/2023 20  <=34 U/L Final    Alkaline Phosphatase 12/12/2023 109  55 - 142 U/L Final    Bilirubin, Total 12/12/2023 0.4  0.2 - 1.1 mg/dL Final    Total Protein 12/12/2023 7.3  5.7 - 8.2 g/dL Final    Albumin 12/12/2023 3.9  3.2 - 4.8 g/dL Final    Globulin  12/12/2023 3.4  2.8 - 4.4 g/dL Final    A/G Ratio 12/12/2023 1.1  1.0 - 2.0 Final    Patient Fasting for CMP? 12/12/2023 No   Final    HgbA1C 12/12/2023 5.2  <5.7 % Final    Estimated Average Glucose 12/12/2023 103  68 - 126 mg/dL Final    Cholesterol, Total 12/12/2023 206 (H)  <200 mg/dL Final    HDL Cholesterol 12/12/2023 68 (H)  40 - 59 mg/dL Final    Triglycerides 12/12/2023 137  30 - 149 mg/dL Final    LDL Cholesterol 12/12/2023 114 (H)  <100 mg/dL Final    VLDL 12/12/2023 24  0 - 30 mg/dL Final    Non HDL Chol 12/12/2023 138 (H)  <130 mg/dL Final    Patient Fasting for Lipid? 12/12/2023 No   Final    TSH 12/12/2023 1.905  0.550 - 4.780 mIU/mL Final    WBC 12/12/2023 4.5  4.0 - 11.0 x10(3) uL Final    RBC 12/12/2023 3.87  3.80 - 5.30 x10(6)uL Final    HGB 12/12/2023 11.4 (L)  12.0 - 16.0 g/dL Final    HCT 12/12/2023 36.2  35.0 - 48.0 % Final    MCV 12/12/2023 93.5  80.0 - 100.0 fL Final    MCH 12/12/2023 29.5  26.0 - 34.0 pg Final    MCHC 12/12/2023 31.5  31.0 - 37.0 g/dL Final    RDW-SD 12/12/2023 44.6  35.1 - 46.3 fL Final    RDW 12/12/2023 13.1  11.0 - 15.0 % Final    PLT 12/12/2023 130.0 (L)  150.0 - 450.0 10(3)uL Final    Neutrophil Absolute Prelim  12/12/2023 2.41  1.50 - 7.70 x10 (3) uL Final    Neutrophil Absolute 12/12/2023 2.41  1.50 - 7.70 x10(3) uL Final    Lymphocyte Absolute 12/12/2023 1.37  1.00 - 4.00 x10(3) uL Final    Monocyte Absolute 12/12/2023 0.53  0.10 - 1.00 x10(3) uL Final    Eosinophil Absolute 12/12/2023 0.14  0.00 - 0.70 x10(3) uL Final    Basophil Absolute 12/12/2023 0.03  0.00 - 0.20 x10(3) uL Final    Immature Granulocyte Absolute 12/12/2023 0.01  0.00 - 1.00 x10(3) uL Final    Neutrophil % 12/12/2023 53.7  % Final    Lymphocyte % 12/12/2023 30.5  % Final    Monocyte % 12/12/2023 11.8  % Final    Eosinophil % 12/12/2023 3.1  % Final    Basophil % 12/12/2023 0.7  % Final    Immature Granulocyte % 12/12/2023 0.2  % Final   ]      Radiology Imaging:  I reviewed with the patient her MRI of the lumbar spine from 12/28/2023  MRI SPINE LUMBAR (CPT=72148)  Narrative: PROCEDURE: MRI SPINE LUMBAR (CPT=72148)     COMPARISON: [facility], MRI SPINE LUMBAR (W+WO) (CPT=72158), 11/10/2016, 6:26 AM.     INDICATIONS: M25.561 Acute pain of right knee M25.512 Chronic pain of both shoulders G89.29 Chronic pain of both shoulders M25.511 Chronic pain of both shoulders M47.816 Divine*     TECHNIQUE: A variety of imaging planes and parameters were utilized for visualization of suspected pathology.     FINDINGS:   PARASPINAL AREA: Normal with no visible mass.    BONES: No fracture, pars defect, or osseous lesion.    CORD/CAUDA EQUINA: Normal caliber, contour, and signal intensity.    OTHER: Negative.     LUMBAR DISC LEVELS:  L1-L2: Mild spondylotic disc bulge.  Facet joints intact.  No significant stenosis.  L2-L3: Mild spondylotic disc bulge accentuated laterally.  No significant posterior disc contour abnormality or stenosis.  Facet joints intact.  L3-L4: Mild spondylotic disc bulge accentuated laterally.  Mild caudal left foraminal narrowing.  No significant right foraminal narrowing or central narrowing.  Facet joints intact.  L4-L5: Slight  decrease in disc height with a spondylotic disc bulging and a superimposed broad-based left paracentral disc protrusion.  Mild to moderate asymmetric central narrowing and left lateral narrowing.  Mild to moderate bilateral foramen entry   zone narrowing.  Moderate to advanced facet arthrosis.  L5-S1: Spondylotic disc bulge accentuated laterally.  Moderate right greater than left facet arthrosis.  Mild right greater than left lateral narrowing and foraminal narrowing.  No significant central narrowing.              Impression: CONCLUSION:   Multilevel mild-to-moderate degenerative disc disease and moderate to advanced L4-5 and L5-S1 facet arthrosis.  Most significant findings as follows:  1. L4-5:  A broad-based left paracentral disc protrusion superimposed on spondylosis.  Mild-to-moderate asymmetric central narrowing and left lateral narrowing.  Mild to moderate bilateral foramen entry zone narrowing.  2. L5-S1:  Mild right greater than left peripheral narrowing.           Dictated by (CST): Bay Andrade MD on 12/28/2023 at 0:49 AM       Finalized by (CST): Bay Andrade MD on 12/28/2023 at 0:58 AM              ASSESSMENT AND PLAN:  Lizbeth is a pleasant 78-year-old female who presents for follow-up with complaints of left-sided low back pain with radiation down the left leg into the dorsum of the foot.  She does have weakness during left great toe extension.  I have independently reviewed her MRI where she does have a broad-based left paracentral disc protrusion at L4-L5 with facet hypertrophy and ligamentum flavum hypertrophy.  I believe these are contributing to her lumbar radiculopathy.  I am recommending a left L5 and left S1 transforaminal epidural steroid injection under local anesthesia.  She will follow-up with me 2 weeks after the procedure.  As a pertains to her right shoulder pain, I believe this is due to rotator cuff tendinopathy and impingement syndrome.  I have recommended a right subacromial  injection which we performed today.  Please see separate procedure note.  She will also be following up with Dr. Spears regarding pain in her head as she has a history of a tumor resection there.  She will follow-up with me 2 weeks after her epidural injection       RTC in 2 weeks after epidural injection  Discharge Instructions were provided as documented in AVS summary.  The patient was in agreement with the assessment and plan.  All questions were answered.  There were no barriers to learning.         1. Myalgia    2. Facet syndrome, lumbar    3. Mechanical low back pain    4. Lumbar spondylosis    5. DDD (degenerative disc disease), lumbosacral    6. Bulge of lumbar disc without myelopathy    7. Lumbar foraminal stenosis    8. Lumbar radiculopathy    9. Chronic right shoulder pain    10. Tendinopathy of rotator cuff, unspecified laterality    11. Subacromial bursitis of right shoulder joint        Alex B. Behar MD  Physical Medicine and Rehabilitation/Sports Medicine  [facility]

## 2024-01-03 NOTE — PATIENT INSTRUCTIONS
1) My office will call you to schedule the RIGHT subacromial CSI once the procedure is approved by your insurance carrier.    2) My office will call you to schedule the LEFT L5 and LEFt S1 TFESI under local anesthesia once the procedure is approved by your insurance carrier.    3) Follow up with Dr. Shelton Spears to discuss your headaches at the spot of your previous tumor  4) Follow up with me 2 weeks after the procedure. This can be in the office or virtually.       Instrucciones posteriores a la inyección   No whit nada extenuante le los próximos dos días (si recibió juvenal inyección en la rodilla, no camine más de 2 ion de la ciudad, no asista a clases de aeróbicos, no corra, no levante objetos pesados, no permanezca de pie por mucho tiempo).  -Puede reanudar claudia actividades cotidianas después de la inyección.  -Es posible que experimente juvenal leve hinchazón después del procedimiento.  -Coloque hielo en la articulación que se inyectó al menos 5 a 6 veces al día (15 minutos) le dos días después (esto ayudará a prevenir el empeoramiento del dolor que a veces ocurre después de juvenal inyección).  -Solo tome tylenol si es necesario para el dolor le los primeros días.  -Esté atento a los signos de infección que incluyen enrojecimiento, calor, empeoramiento del dolor, fiebre o escalofríos. Si desarrolla alguno de estos signos, llame a la oficina de inmediato al [phone]  -Todos responden a payne manera diferente a las inyecciones, law puede esperar claudia efectos máximos unas semanas después de payne última inyección.  Alex B. Behar MD  Physical Medicine and Rehabilitation/Sports Medicine  [facility]

## 2024-01-03 NOTE — PROCEDURES
[facility]   Shoulder Injection Procedure Note    CHIEF COMPLAINT:    Chief Complaint   Patient presents with    Follow - Up     LOV: 11/20/2023 pt comes in to f/u on MRI of L-spine. Presents with bilateral low back aching pain. Denies weakness. Admits T/N in L lower leg. Rates pain 0/10 now. Takes diclofenac 50mg BID, pregabalin 100mg BID. Currently in PT.       PROCEDURE PERFORMED:  Right subacromial corticosteroid injection     INDICATIONS:  Right shoulder pain    PRIMARY PROCEDURALIST:  Alex Behar, MD    INFORMED CONSENT & TIME OUT:   As documented in the Time Out and Pre-Procedure Check Lists.  Verbal consent was obtained    Vitals: [unfilled]  Labs (document last wbc, plts, hgb, and PT/INR):   [facility] Lab Encounter on 12/12/2023   Component Date Value Ref Range Status    Glucose 12/12/2023 81  70 - 99 mg/dL Final    Sodium 12/12/2023 136  136 - 145 mmol/L Final    Potassium 12/12/2023 5.0  3.5 - 5.1 mmol/L Final    Chloride 12/12/2023 106  98 - 112 mmol/L Final    CO2 12/12/2023 27.0  21.0 - 32.0 mmol/L Final    Anion Gap 12/12/2023 3  0 - 18 mmol/L Final    BUN 12/12/2023 33 (H)  9 - 23 mg/dL Final    Creatinine 12/12/2023 1.04 (H)  0.55 - 1.02 mg/dL Final    BUN/CREA Ratio 12/12/2023 31.7 (H)  10.0 - 20.0 Final    Calcium, Total 12/12/2023 10.1  8.7 - 10.4 mg/dL Final    Calculated Osmolality 12/12/2023 288  275 - 295 mOsm/kg Final    eGFR-Cr 12/12/2023 55 (L)  >=60 mL/min/1.73m2 Final    ALT 12/12/2023 10  10 - 49 U/L Final    AST 12/12/2023 20  <=34 U/L Final    Alkaline Phosphatase 12/12/2023 109  55 - 142 U/L Final    Bilirubin, Total 12/12/2023 0.4  0.2 - 1.1 mg/dL Final    Total Protein 12/12/2023 7.3  5.7 - 8.2 g/dL Final    Albumin 12/12/2023 3.9  3.2 - 4.8 g/dL Final    Globulin  12/12/2023 3.4  2.8 - 4.4 g/dL Final    A/G Ratio 12/12/2023 1.1  1.0 - 2.0 Final    Patient Fasting for CMP? 12/12/2023 No   Final    HgbA1C 12/12/2023 5.2  <5.7 % Final     Estimated Average Glucose 12/12/2023 103  68 - 126 mg/dL Final    Cholesterol, Total 12/12/2023 206 (H)  <200 mg/dL Final    HDL Cholesterol 12/12/2023 68 (H)  40 - 59 mg/dL Final    Triglycerides 12/12/2023 137  30 - 149 mg/dL Final    LDL Cholesterol 12/12/2023 114 (H)  <100 mg/dL Final    VLDL 12/12/2023 24  0 - 30 mg/dL Final    Non HDL Chol 12/12/2023 138 (H)  <130 mg/dL Final    Patient Fasting for Lipid? 12/12/2023 No   Final    TSH 12/12/2023 1.905  0.550 - 4.780 mIU/mL Final    WBC 12/12/2023 4.5  4.0 - 11.0 x10(3) uL Final    RBC 12/12/2023 3.87  3.80 - 5.30 x10(6)uL Final    HGB 12/12/2023 11.4 (L)  12.0 - 16.0 g/dL Final    HCT 12/12/2023 36.2  35.0 - 48.0 % Final    MCV 12/12/2023 93.5  80.0 - 100.0 fL Final    MCH 12/12/2023 29.5  26.0 - 34.0 pg Final    MCHC 12/12/2023 31.5  31.0 - 37.0 g/dL Final    RDW-SD 12/12/2023 44.6  35.1 - 46.3 fL Final    RDW 12/12/2023 13.1  11.0 - 15.0 % Final    PLT 12/12/2023 130.0 (L)  150.0 - 450.0 10(3)uL Final    Neutrophil Absolute Prelim 12/12/2023 2.41  1.50 - 7.70 x10 (3) uL Final    Neutrophil Absolute 12/12/2023 2.41  1.50 - 7.70 x10(3) uL Final    Lymphocyte Absolute 12/12/2023 1.37  1.00 - 4.00 x10(3) uL Final    Monocyte Absolute 12/12/2023 0.53  0.10 - 1.00 x10(3) uL Final    Eosinophil Absolute 12/12/2023 0.14  0.00 - 0.70 x10(3) uL Final    Basophil Absolute 12/12/2023 0.03  0.00 - 0.20 x10(3) uL Final    Immature Granulocyte Absolute 12/12/2023 0.01  0.00 - 1.00 x10(3) uL Final    Neutrophil % 12/12/2023 53.7  % Final    Lymphocyte % 12/12/2023 30.5  % Final    Monocyte % 12/12/2023 11.8  % Final    Eosinophil % 12/12/2023 3.1  % Final    Basophil % 12/12/2023 0.7  % Final    Immature Granulocyte % 12/12/2023 0.2  % Final   ]    PROCEDURE:    The risks and benefits of intraarticular corticosteroid injection were again explained to the patient and verbal consent was obtained. The Right shoulder was prepped and draped in the usual sterile fashion. A 25 G  1.5-inch needle was introduced into the Right subacromial space in a posterolateral approach while injecting 1.5 cc of 1% lidocain. Aspiration was attempted and there was no fluid able to be aspirated. We switched the syringe to one containing 4 mL of 1% lidocaine and 1 mL of 40 mg/mL Kenalog and it was injected.  The needle was then removed and hemostasis was obtained.  The skin site was cleansed and a clean dressing was applied.  The patient tolerated the procedure well.   Patient verbalized understanding of assessment and plan.  Patient is in agreement with the plan.  All questions were answered.  No barriers to learning identified.       INSTRUCTIONS GIVEN TO PATIENT:    \"You will see an effect in the next 2-3 days.  Please contact me if you have fevers, worsening swelling, worsening pain, decreased range of motion, increased redness, chills, or anything that makes you concerned about how the joint we injected feels/looks.  If you do not reach me in a reasonable time, please report directly to the emergency room for further evaluation\"      Alex B. Behar MD, [facility]  Physical Medicine and Rehabilitation/Sports Medicine  [facility]

## 2024-01-04 NOTE — TELEPHONE ENCOUNTER
Per CMS Guidelines -no authorization is required for RIGHT subacromial CSI CPT 51723,     Status: Authorization is not required based on medical necessity however may be subject to review once claim is submitted-Covered Benefit     RIGHT subacromial CSI done in office    AND    Per CMS Guidelines -no authorization is required for LEFT L5 and LEFT S1 TFESI CPT 87452, 40936    Status: Authorization is not required based on medical necessity however may be subject to review once claim is submitted-Covered Benefit

## 2024-01-04 NOTE — TELEPHONE ENCOUNTER
Patient has been scheduled for LEFT L5 and LEFT S1 TFESI  on 2/7/2024 at the [facility] with Dr. Behar.   -Anesthesia type:  Local  -Patient was advised that if he/she does receive the covid vaccine it needs to be at least 2 weeks before or after the injection.  -Medications and allergies reviewed.  -Patient reminded to hold NSAIDs (Ibuprofen, ASA 81, Aleve, Naproxen, Mobic, Diclofenac, Etodolac, Celebrex etc.) for 3 days prior to Lumbar MBB/Facet if BMI is greater than 35. For Cervical injections only hold multivitamins, Vitamin E, Fish Oil, Phentermine/Lomaira for 7 days prior to injection and NSAIDS.   mg to be held for 7 days prior to injections.  -If patient is receiving MAC/IVCS, weight loss oral/injectable medications will need to be held for 7 days prior to injection.  -Patient informed to fast 12 hours prior to procedure with IVCS/MAC.   -If on blood thinner, clearance has been received and approved to hold this medication by provider.   -Patient informed of [facility]'s  policy:  he/she will need a  to and from procedure and must be on site for their entirety of their visit, if their ride is unable to the procedure will be cancelled.   -[facility] is located in the [facility] 1st floor,  may park in the yellow/purple parking lot.  Patient verbalized understanding and agrees with plan.  Scheduled in Epic: Yes  Scheduled in Surgical Case: Yes  Follow up appointment made: NOV: 2/23/2024 Behar, Alex, MD

## 2024-01-29 NOTE — PROGRESS NOTES
Chief Complaint   Patient presents with    Hair/Scalp Problem     C/o scalp pain      Fatigue     C/o fatigue, feeling tired throughout the day       HPI:   Lizbeth Matute is a 78 year old female who presents to clinic with complaints of fatigue, lack of motivation, excessive sleepiness.  She is already on sertraline for depression.  Does report occasional palpitations/increased anxiety when she lays down at night.    Reports scalp pain/mainly left-sided headaches.  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  Her last CT brain imaging was in 2016.  Also reports new onset of tinnitus affecting both sides.    REVIEW OF SYSTEMS:   Negative, except per HPI.     EXAM:   /78 (BP Location: Right arm, Patient Position: Sitting, Cuff Size: adult)   Pulse 60   Wt 128 lb (58.1 kg)   BMI 27.70 kg/m²   Body mass index is 27.7 kg/m².  GENERAL: well developed, well nourished, in no apparent distress  SKIN: no rashes, no suspicious lesions  HEENT: atraumatic, normocephalic, + no excessive cerumen or TM abnormality  EYES: PERRLA, EOMI,conjunctiva are clear  NECK: supple, no adenopathy  NEURO: Oriented times three, motor and sensory are grossly intact    ASSESSMENT AND PLAN:     1. Fatigue, unspecified type  Recent lab work reviewed.  Does not appear to be metabolic.  Likely secondary to depression.  Continue sertraline 100.  Seasonal depression likely making this worse.    2. Need for shingles vaccine  - Zoster Vac Recomb Adjuvanted (SHINGRIX) 50 MCG/0.5ML Intramuscular Recon Susp; Inject 0.5 mL into the muscle one time for 1 dose.  Dispense: 1 each; Refill: 1    3. Brain tumor (HCC)  - CT BRAIN OR HEAD (33857); Future    4. CNS lymphoma (HCC)  - CT BRAIN OR HEAD (73770); Future    5. New onset headache  - CT BRAIN OR HEAD (95957); Future    6. Tinnitus of right ear  No obvious medications or recent upper respiratory infection  - CT BRAIN OR HEAD (19654); Future    7. Primary  hypertension  Blood pressure well-controlled in the office today.     RTC if no improvement in symptoms. Red flags discussed to go to [facility].     Kimmy Talbot MD  1/29/2024  1:34 PM

## 2024-03-09 NOTE — ED INITIAL ASSESSMENT (HPI)
Patient fell yesterday, due to the rain. Patient c/o left hand pain, right sided flank/back pain, head pain and right knee pain.

## 2024-03-09 NOTE — ED PROVIDER NOTES
Patient Seen in: [facility] Emergency Department      History     Chief Complaint   Patient presents with    Fall     Stated Complaint: fall/triped, wrist pain, right knee pain    Subjective:   HPI    78-year-old female with arthritis, dementia, hypertension, thyroid disorder presents today after falling yesterday in a parking lot in the rain.  Patient states she slipped off the concrete medium in a parking lot and fell forward catching herself with her left hand, right knee and hitting her head on the ground.  Patient denies any loss of consciousness.    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 , PROSTHETIC EYE RT              Past Surgical History:   Procedure Laterality Date    BRAIN SURGERY  10/31/2016    Right frontal craniotomy for tumor resection    CHOLECYSTECTOMY  2012    COLONOSCOPY      at [facility], [place] about 8 years ago    COLONOSCOPY N/A 2/15/2023    Procedure: COLONOSCOPY;  Surgeon: Radha Sr MD;  Location: [facility] ENDOSCOPY    HYSTERECTOMY      KNEE SURGERY Right 10/2022    OTHER SURGICAL HISTORY      Bionic Eye    OTHER SURGICAL HISTORY      lymphoma biopsy    OTHER SURGICAL HISTORY      3 RT hand surgeries for broken fingers     TUBAL LIGATION                  Social History     Socioeconomic History    Marital status:    Tobacco Use    Smoking status: Never     Passive exposure: Never    Smokeless tobacco: Never   Vaping Use    Vaping  Use: Never used   Substance and Sexual Activity    Alcohol use: Not Currently     Alcohol/week: 0.0 standard drinks of alcohol    Drug use: No   Other Topics Concern    Caffeine Concern No     Comment: Coffee 1 cup daily    Sleep Concern No    Exercise Yes   Social History Narrative    The patient does not use an assistive device..      The patient does live in a home with stairs.              Review of Systems   Constitutional: Negative.    HENT: Negative.     Eyes: Negative.    Respiratory: Negative.     Cardiovascular: Negative.    Gastrointestinal: Negative.    Endocrine: Negative.    Genitourinary: Negative.    Musculoskeletal:  Positive for arthralgias and joint swelling.   Skin:  Positive for wound.   Allergic/Immunologic: Negative.    Neurological: Negative.    Hematological: Negative.    Psychiatric/Behavioral: Negative.         Positive for stated complaint: fall/triped, wrist pain, right knee pain  Other systems are as noted in HPI.  Constitutional and vital signs reviewed.      All other systems reviewed and negative except as noted above.    Physical Exam     ED Triage Vitals [03/09/24 1520]   BP (!) 169/75   Pulse 64   Resp 20   Temp 98.4 °F (36.9 °C)   Temp src    SpO2 97 %   O2 Device None (Room air)       Current:BP (!) 164/59   Pulse 58   Temp 98.4 °F (36.9 °C)   Resp 18   SpO2 98%         Physical Exam  Vitals and nursing note reviewed.   Constitutional:       Appearance: Normal appearance. She is normal weight.   HENT:      Head:      Comments: Ecchymosis appreciated to the left side of the forehead.     Right Ear: External ear normal.      Left Ear: External ear normal.      Nose: Nose normal.      Mouth/Throat:      Mouth: Mucous membranes are moist.      Pharynx: Oropharynx is clear.   Eyes:      Extraocular Movements: Extraocular movements intact.      Conjunctiva/sclera: Conjunctivae normal.      Comments: Left eye is a prostatic.  Right pupil reactive.   Cardiovascular:      Rate and  Rhythm: Normal rate and regular rhythm.      Pulses: Normal pulses.      Heart sounds: Normal heart sounds.   Pulmonary:      Effort: Pulmonary effort is normal.      Breath sounds: Normal breath sounds.   Abdominal:      General: Abdomen is flat. Bowel sounds are normal.      Palpations: Abdomen is soft.   Musculoskeletal:         General: Swelling, tenderness and signs of injury present.      Cervical back: Normal range of motion and neck supple.      Comments: Swelling and ecchymosis noted to the dorsal aspect of the left hand.  Evidence of historical knee surgery noted to the right knee.  Tenderness to palpation to the right knee.   Skin:     Capillary Refill: Capillary refill takes less than 2 seconds.      Findings: Bruising and erythema present.   Neurological:      Mental Status: She is alert and oriented to person, place, and time.      Comments: No acute neurological deficit appreciated.   Psychiatric:         Mood and Affect: Mood normal.             ED Course   Labs Reviewed - No data to display                 MDM      78-year-old female with arthritis, dementia, hypertension, thyroid disorder presents today after falling yesterday in a parking lot in the rain.  Patient states she slipped off the concrete medium in a parking lot and fell forward catching herself with her left hand, right knee and hitting her head on the ground.  Patient denies any loss of consciousness.  Vital signs: Please see EMR.  Physical exam: Please see exam.  Differential diagnosis: Intracranial process, skull fracture, rib fracture, hand fracture, knee fracture, fall, contusion.  Modified NIH: 0.  [place] head CT: Consider for CT.  CT BRAIN OR HEAD (16013)    Result Date: 3/9/2024  CONCLUSION:  1. No acute intracranial process. 2. Postoperative changes again noted from a remote right parietal craniotomy. 3. Stable chronic left ethmoidomaxillary sinusitis. 4. Lesser incidental findings as above.    Dictated by (CST): Cesar  MD Oliver on 3/09/2024 at 6:11 PM     Finalized by (CST): Oliver Guerrero MD on 3/09/2024 at 6:16 PM          XR KNEE (1 OR 2 VIEWS), RIGHT (CPT=73560)    Result Date: 3/9/2024  CONCLUSION:  1. Stable appearance of a right total knee arthroplasty.  No periprosthetic fracture, dislocation or radiographic evidence of prosthetic loosening. 2. Stable small right knee joint effusion.    Dictated by (CST): Oliver Guerrero MD on 3/09/2024 at 5:59 PM     Finalized by (CST): Oliver Guerrero MD on 3/09/2024 at 6:00 PM          XR RIBS WITH CHEST (3 VIEWS), RIGHT  (CPT=71101)    Result Date: 3/9/2024  CONCLUSION:  1. No right rib fractures or pneumothorax. 2. Stable cardiomegaly.    Dictated by (CST): Oliver Guerrero MD on 3/09/2024 at 5:35 PM     Finalized by (CST): Oliver Guerrero MD on 3/09/2024 at 5:36 PM          XR HAND (MIN 3 VIEWS), LEFT (CPT=73130)    Result Date: 3/9/2024  CONCLUSION:  1. No acute fracture or dislocation. 2. Stable chronic healed fractures involving the bases of the 4th and 5th metacarpals.  Stable chronic nonunited fracture of the ulnar styloid process. 3. Stable mild arthritic changes involving the left hand with chondrocalcinosis in the triangular fibrocartilage complex suggesting CPPD arthropathy.    Dictated by (CST): Oliver Guerrero MD on 3/09/2024 at 5:33 PM     Finalized by (CST): Oliver Guerrero MD on 3/09/2024 at 5:35 PM         Diagnose with head injury, contusion and fall.  Patient is to follow-up with primary care provider within 24 hours after head injury.  ED precautions given.  Note to Patient  The 21st Century Cures Act makes medical notes like these available to patients in the interest of transparency. However, be advised this is a medical document and is intended as grhj-rr-nrxj communication; it is written in medical language and may appear blunt, direct, or contain abbreviations or verbiage that are unfamiliar. Medical documents are intended to carry  relevant information, facts as evident, and the clinical opinion of the practitioner.     This report has been produced using speech recognition software, and may contain errors related to grammar, punctuation, spelling, words or phrases unrecognized or not translated appropriately to text; these errors may be referred to the dictating provider for further clarification and/or addendum as needed.                                     Medical Decision Making  78-year-old female with arthritis, dementia, hypertension, thyroid disorder presents today after falling yesterday in a parking lot in the rain.  Patient states she slipped off the concrete medium in a parking lot and fell forward catching herself with her left hand, right knee and hitting her head on the ground.  Patient denies any loss of consciousness.    Amount and/or Complexity of Data Reviewed  Radiology: ordered. Decision-making details documented in ED Course.        Disposition and Plan     Clinical Impression:  1. Injury of head, initial encounter    2. Contusion of left hand, initial encounter    3. Fall, initial encounter         Disposition:  Discharge  3/9/2024  7:28 pm    Follow-up:  Kimmy Talbot MD  [address] [place] [address]  [address]  [facility] [address]  [phone]    Follow up in 1 day(s)  ER follow up    We recommend that you schedule follow up care with a primary care provider within the next three months to obtain basic health screening including reassessment of your blood pressure.      Medications Prescribed:  Current Discharge Medication List

## 2024-04-11 NOTE — DISCHARGE INSTRUCTIONS
Thank you for seeking care at [facility] Emergency Department.  As discussed, you should take Ibuprofen (also called Advil or Motrin) or Naproxen (also called Aleve) for your pain. If you are taking Ibuprofen, you can take 600 mg every 6 hours, or 800 mg every 8 hours. If you are taking Naproxen, you can take 500 mg every 12 hours. Do not take more than this amount as it can cause kidney problems or bleeding in your stomach.     If your pain is not controlled you can also take Acetaminophen (also called Tylenol) 1 gram every 6 hours. Do not take more than 4 grams over the course of a day from all medications you take. You can take this medication in alternation with Ibuprofen so that every 3 hours you are taking either 600 mg of Ibuprofen or 1 gram of Acetaminophen.         Shravan por buscar atención en el Departamento de Emergencias de [facility].  Destinee se ha comentado, debe meredith ibuprofeno (también llamado Advil o Motrin) o naproxeno (también llamado Aleve) para el dolor. Si está tomando ibuprofeno, puede meredith 600 mg cada 6 horas u 800 mg cada 8 horas. Si está tomando naproxeno, puede meredith 500 mg cada 12 horas. No tome más de esta cantidad, ya que puede causar problemas renales o sangrado en el estómago. No tome estos medicamentos al mismo tiempo, ya que puede aumentar el riesgo de sufrir estos efectos secundarios.    Si payne dolor no está controlado, también puede meredith 1 gramo de acetaminofén (también llamado Tylenol) cada 6 horas. No tome más de 4 gramos en el transcurso de un día de todos los medicamentos que bibiana. Puede meredith jose david medicamento en alternancia con ibuprofeno para que cada 3 horas tome 600 mg de ibuprofeno o 1 gramo de paracetamol.     Destinee se le aconsejó anteriormente, whit un seguimiento con payne médico de atención primaria para un mayor manejo de payne dolor.

## 2024-04-11 NOTE — ED INITIAL ASSESSMENT (HPI)
Pt presents to the ER with c/o right shoulder pain x 5 days. Pain worse with movement. Pt denies trauma or chest pain.    No obvious deformity noted.

## 2024-04-11 NOTE — ED PROVIDER NOTES
[facility] Emergency Department Note  Patient: Lizbeth Matute Age: 78 year old Sex: female      MRN: [id number]  : 1945    Patient Seen in: [facility] Emergency Department    History     Chief Complaint   Patient presents with    Shoulder Pain     Stated Complaint: right side pain    History obtained from: Patient    78-year-old female with past medical history of hypertension, hyperlipidemia, lymphoma, rheumatoid arthritis, fibromyalgia presents today for evaluation of right-sided chest and shoulder pain.  She states that she has been having pain over the right side of her chest and axilla over the past 5 days.  States that pain is worse whenever she moves her right arm.  States that she also has pain with deep breathing and coughing.  She denies any shortness of breath or fevers.  States that she did have a mechanical fall 3 weeks ago and fell on her left side.  States that she has no pain at rest.  No numbness or tingling in the upper extremity.  No weakness.    Review of Systems:  Review of Systems  Positive for stated complaint: right side pain. Constitutional and vital signs reviewed. All other systems reviewed and negative except as noted above.    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 , PROSTHETIC EYE RT       Past Surgical History:   Procedure Laterality Date    Brain surgery  10/31/2016    Right frontal craniotomy for tumor resection    Cholecystectomy      Colonoscopy       at [facility] about 8 years ago    Colonoscopy N/A 2/15/2023    Procedure: COLONOSCOPY;  Surgeon: Radha Sr MD;  Location: [facility] ENDOSCOPY    Hysterectomy      Knee surgery Right 10/2022    Other surgical history      Bionic Eye    Other surgical history      lymphoma biopsy    Other surgical history      3 RT hand surgeries for broken fingers     Tubal ligation          Family History   Problem Relation Age of Onset    Cancer Mother         uterine cancer    Arthritis Father         Rheumatoid    Other (Other) Maternal Grandmother     Other (Other) Maternal Grandfather     Other (Other) Paternal Grandmother     Other (Other) Paternal Grandfather     Cancer Cousin         breast cancer    Diabetes Neg     Glaucoma Neg     Macular degeneration Neg        Specific Social Determinants of Health:   Social History     Socioeconomic History    Marital status:    Tobacco Use    Smoking status: Never     Passive exposure: Never    Smokeless tobacco: Never   Vaping Use    Vaping status: Never Used   Substance and Sexual Activity    Alcohol use: Not Currently     Alcohol/week: 0.0 standard drinks of alcohol    Drug use: No   Other Topics Concern    Caffeine Concern No     Comment: Coffee 1 cup daily    Sleep Concern No    Exercise Yes   Social History Narrative    The patient does not use an assistive device..      The patient does live in a home with stairs.     Social Determinants of Health     Financial Resource Strain: Medium Risk (5/28/2020)    [facility]    Overall Financial Resource Strain (CARDIA)     Difficulty of Paying Living Expenses: Somewhat hard   Food Insecurity: Food Insecurity Present (5/28/2020)    [facility] from [facility], [facility]    Hunger Vital Sign     Worried About Running Out of Food in the Last Year: Sometimes true     Ran Out of Food in the Last Year: Often true    Transportation Needs: Unmet Transportation Needs (8/25/2020)    Received from [facility]    PRAPARE - Transportation     Lack of Transportation (Medical): Yes     Lack of Transportation (Non-Medical): Yes   Physical Activity: Insufficiently Active (5/28/2020)    Received from [facility]    Exercise Vital Sign     Days of Exercise per Week: 1 day     Minutes of Exercise per Session: 30 min   Stress: Stress Concern Present (5/28/2020)    Received from [facility]    [employer] - Occupational Stress Questionnaire     Feeling of Stress : To some extent   Social Connections: Moderately Integrated (5/28/2020)    Received from [facility]    Social Connection and Isolation Panel [NHANES]     Frequency of Communication with Friends and Family: More than three times a week     Frequency of Social Gatherings with Friends and Family: Once a week     Attends [employer] Services: More than 4 times per year     Active Member of Clubs or Organizations: Yes     Attends Club or Organization Meetings: Never     Marital Status:            PSFH elements reviewed from today and agreed except as otherwise stated in HPI.    Physical Exam     ED Triage Vitals [04/11/24 0936]   /65   Pulse 64   Resp 18   Temp 97.4 °F (36.3 °C)   Temp src Oral   SpO2 98 %   O2 Device None (Room air)       Current:/56   Pulse 59   Temp 97.4 °F (36.3 °C) (Oral)   Resp 12   Wt 59 kg   SpO2 95%   BMI 26.26 kg/m²         Physical Exam  Constitutional:       General: She is not in acute distress.  HENT:      Head: Normocephalic and atraumatic.      Mouth/Throat:      Mouth: Mucous membranes are moist.   Eyes:      Extraocular Movements: Extraocular movements intact.   Cardiovascular:       Rate and Rhythm: Normal rate and regular rhythm.      Heart sounds: Normal heart sounds.   Pulmonary:      Effort: Pulmonary effort is normal. No respiratory distress.      Breath sounds: Normal breath sounds.   Abdominal:      Palpations: Abdomen is soft.      Tenderness: There is no abdominal tenderness.   Musculoskeletal:      Comments: No reproducible bony tenderness over the shoulder, scapular neck.  Reproducible pain with internal rotation of the right upper extremity with positive liftoff sign on shoulder exam.  Negative empty can testing,   Skin:     General: Skin is warm and dry.      Capillary Refill: Capillary refill takes less than 2 seconds.      Findings: No rash.   Neurological:      General: No focal deficit present.      Mental Status: She is alert and oriented to person, place, and time.   Psychiatric:         Mood and Affect: Mood normal.         Behavior: Behavior normal.         ED Course   Labs:   Labs Reviewed - No data to display  Radiology findings:  I personally reviewed the images.   XR RIBS WITH CHEST (3 VIEWS), RIGHT  (CPT=71101)    Result Date: 4/11/2024  CONCLUSION:   No acute displaced fracture.  No radiographic evidence of acute cardiopulmonary abnormality.    Dictated by (CST): Reza Stokes MD on 4/11/2024 at 12:06 PM     Finalized by (CST): Reza Stokes MD on 4/11/2024 at 12:09 PM          XR SHOULDER, COMPLETE (MIN 2 VIEWS), RIGHT (CPT=73030)    Result Date: 4/11/2024  CONCLUSION:  1. No acute appearing fracture or dislocation.  Lesser findings as described above.  See above.    Dictated by (CST): Alex Beard MD on 4/11/2024 at 11:08 AM     Finalized by (CST): Alex Beard MD on 4/11/2024 at 11:09 AM           EKG as interpreted by me: Ventricular rate 66, normal sinus rhythm, normal axis, no NJ interval prolongation, narrow QRS, QTc 396 ms, no ST segment elevations or depressions, no abnormal T wave inversions  Cardiac Monitor: Interpreted by me.   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  On exam, she has no pain at rest.  She has reproducible pain with internal rotation of the right shoulder.  Normal distal pulses and distal perfusion.    Differential diagnoses considered includes, but is not limited to: Chest wall strain, subscapularis muscle strain, tendinitis, rotator cuff injury, rib fracture, pneumothorax    Will obtain the following tests: X-ray right ribs with chest, x-ray right shoulder  Please see ED course for my independent review of these tests/imaging results.    Initial Medications/Therapeutics administered: Tylenol, Lidocaine patch    Chronic conditions affecting care: Hypertension, hyperlipidemia, CNS lymphoma currently in remission, rheumatoid arthritis, fibromyalgia       ED course: I independently reviewed the right rib x-rays that show no evidence of rib fracture or pneumothorax.  Agree with radiology reads above.  Discussed continued analgesic medications and close PCP follow-up for likely musculoskeletal etiology of patient's symptoms.  Return precautions were provided and all questions answered.  Patient expressed understanding and agreement with plan.      Disposition and Plan     Clinical Impression:  1. Muscle strain of chest wall, initial encounter        Disposition:  Discharge    Follow-up:  Kimmy Talbot MD  [address] [place] [address]  [address]  [facility] [place] [facility] [address]  [phone]    Schedule an appointment as soon as possible for a visit in 2 day(s)  As needed, If symptoms worsen      Medications Prescribed:  Discharge  Medication List as of 4/11/2024 12:36 PM        START taking these medications    Details   lidocaine 5 % External Patch Place 1 patch onto the skin daily for 7 days., Normal, Disp-7 patch, R-0               This note may have been created using voice dictation technology and may include inadvertent errors.      Valeria Brand MD  Emergency Medicine

## 2024-04-14 NOTE — ED QUICK NOTES
Patient out of department for additional testing.   Pt awake, alert, no distress with lac to bottom of left foot after stepping on unknown object last night. Grandma cleaned area, but bleeding persists

## 2024-05-07 NOTE — PROGRESS NOTES
Chief Complaint   Patient presents with    Chest Pain     C/o right sided chest pain      HPI:   Lizbeth Matute is a 78 year old female who presents to clinic for ER follow-up.  Presented for chest pain.  ACS ruled out.  Currently denies any shortness of breath chest tightness dizziness or palpitations with exertion.      Describing a mainly right-sided chest pain that worsens with certain movements.   She likes to garden and walk around her house wo a cane and has fallen on various occasions.    Her daughter also tells her to stop going to the basement to do laundry because she has a helper does for her but she is bored sitting around her house.  Describes joint pain in her hands.  Her daughter has given her hand braces that are helping.     Requesting Voltaren gel for her chronic bilateral knee pain.    REVIEW OF SYSTEMS:   Negative, except per HPI.     EXAM:   /76 (BP Location: Right arm, Patient Position: Sitting, Cuff Size: adult)   Pulse 70   Wt 129 lb (58.5 kg)   BMI 26.05 kg/m²   Body mass index is 26.05 kg/m².  GENERAL: well developed, well nourished, in no apparent distress  SKIN: no rashes, no suspicious lesions  HEENT: atraumatic, normocephalic  EYES: PERRLA, EOMI,conjunctiva are clear  NECK: supple, no adenopathy    ASSESSMENT AND PLAN:     1. Chest pain, unspecified type  Acs r/o in ER. This is msk pain. Tylenol prn. Resending pregabalin     2. DDD (degenerative disc disease), lumbosacral  - pregabalin 100 MG Oral Cap; Take 1 capsule (100 mg total) by mouth 2 (two) times daily.  Dispense: 60 capsule; Refill: 11    3. Lumbar spondylosis  - pregabalin 100 MG Oral Cap; Take 1 capsule (100 mg total) by mouth 2 (two) times daily.  Dispense: 60 capsule; Refill: 11    4. Chronic pain of both knees  - diclofenac 1 % External Gel; Apply 2 g topically 4 (four) times daily.  Dispense: 150 g; Refill: 2     RTC if no improvement in symptoms. Red flags discussed to go to ER.     Kimmy Talbot,  MD  5/7/2024  3:16 PM

## 2024-05-16 NOTE — PROGRESS NOTES
NURSING INTAKE COMMENTS:   Chief Complaint   Patient presents with    Follow - Up     Right knee pain, denies pain at the moment       HPI: This 78 year old female presents today with complaints of right knee surgery follow-up.  She is now 1 year postoperative from a total knee arthroplasty.  She reports minimal discomfort over the anterior knee.  Her preoperative symptoms have improved.  She is pleased with her status.  She is able to walk without any supports.  No fevers or chills.  No swelling.  No calf pain.    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 , PROSTHETIC EYE RT     Past Surgical History:   Procedure Laterality Date    Brain surgery  10/31/2016    Right frontal craniotomy for tumor resection    Cholecystectomy  2012    Colonoscopy      at [facility] about 8 years ago    Colonoscopy N/A 2/15/2023    Procedure: COLONOSCOPY;  Surgeon: Radha Sr MD;  Location: [facility] ENDOSCOPY    Hysterectomy      Knee surgery Right 10/2022    Other surgical history      Bionic Eye    Other surgical history      lymphoma biopsy    Other surgical history      3 RT hand surgeries for broken fingers     Tubal ligation       Current Outpatient Medications   Medication Sig Dispense Refill    pregabalin 100 MG Oral Cap Take 1 capsule (100 mg total) by mouth 2 (two) times daily. 60 capsule 11    diclofenac 1 % External Gel Apply 2 g topically 4 (four) times daily. 150 g 2    alendronate 70 MG Oral Tab Take 1 tablet (70 mg total) by  mouth every 7 days. 12 tablet 3    cinacalcet 30 MG Oral Tab Take 0.5 tablets (15 mg total) by mouth daily. 45 tablet 3    donepezil 10 MG Oral Tab Take 1 tablet (10 mg total) by mouth every evening. 90 tablet 3    famotidine 20 MG Oral Tab Take 1 tablet (20 mg total) by mouth 2 (two) times daily. 90 tablet 3    lisinopril 20 MG Oral Tab Take 1 tablet (20 mg total) by mouth daily. 90 tablet 3    sertraline 100 MG Oral Tab Take 1 tablet (100 mg total) by mouth nightly. 90 tablet 3    diclofenac 50 MG Oral Tab EC Take 1 tablet (50 mg total) by mouth 2 (two) times daily as needed. 180 tablet 0    ZINC OR Use as directed in the mouth or throat.      celecoxib 200 MG Oral Cap Take 1 capsule (200 mg total) by mouth 2 (two) times daily. (Patient not taking: Reported on 5/7/2024) 30 capsule 1     Allergies   Allergen Reactions    Dust Mite Extract Runny nose    Latex RASH     itching    Latex ITCHING     If exposed for long time     Seasonal UNKNOWN     Family History   Problem Relation Age of Onset    Cancer Mother         uterine cancer    Arthritis Father         Rheumatoid    Other (Other) Maternal Grandmother     Other (Other) Maternal Grandfather     Other (Other) Paternal Grandmother     Other (Other) Paternal Grandfather     Cancer Cousin         breast cancer    Diabetes Neg     Glaucoma Neg     Macular degeneration Neg        Social History     Occupational History    Not on file   Tobacco Use    Smoking status: Never     Passive exposure: Never    Smokeless tobacco: Never   Vaping Use    Vaping status: Never Used   Substance and Sexual Activity    Alcohol use: Not Currently     Alcohol/week: 0.0 standard drinks of alcohol    Drug use: No    Sexual activity: Not on file        Review of Systems:  GENERAL: denies fevers, chills, night sweats, fatigue, unintentional weight loss/gain  SKIN: denies skin lesions, open sores, rash  HEENT:denies recent vision change, new nasal congestion,hearing loss, tinnitus, sore  throat, headaches  RESPIRATORY: denies new shortness of breath, cough, asthma, wheezing  CARDIOVASCULAR: denies chest pain, leg cramps with exertion, palpitations, leg swelling  GI: denies abdominal pain, nausea, vomiting, diarrhea, constipation, hematochezia, worsening heartburn or stomach ulcers  : denies dysuria, hematuria, incontinence, increased frequency, urgency, difficulty urinating  MUSCULOSKELETAL: denies musculoskeletal complaints other than in HPI  NEURO: denies numbness, tingling, weakness, balance issues, dizziness, memory loss  PSYCHIATRIC: denies Hx of depression, anxiety, other psychiatric disorders  HEMATOLOGIC: denies blood clots, anemia, blood clotting disorders, blood transfusion  ENDOCRINE: denies autoimmune disease, thyroid issues, or diabetes  ALLERGY: denies asthma, seasonal allergies    Physical Examination:    There were no vitals taken for this visit.  Constitutional: appears well hydrated, alert and responsive, no acute distress noted  Extremities: Right knee incision well-healed.  No erythema.  Minimal palpable crepitus over the anterior knee.  Knee is stable to varus valgus stress in mid flexion.  No calf tenderness or swelling.  Range of motion is from 0 to 120 degrees.  Neurological: Unchanged    Imaging:   X-rays right knee show well-positioned well-fixed total knee arthroplasty.  No periprosthetic lucency or fracture.  The patella was not resurfaced.    Labs:  Lab Results   Component Value Date    WBC 4.5 12/12/2023    HGB 11.4 (L) 12/12/2023    .0 (L) 12/12/2023      Lab Results   Component Value Date    GLU 81 12/12/2023    BUN 33 (H) 12/12/2023    CREATSERUM 1.04 (H) 12/12/2023    GFR >60 04/13/2015    GFRNAA >60 11/27/2016    GFRAA >60 11/27/2016        Assessment and Plan:  Diagnoses and all orders for this visit:    Post-traumatic osteoarthritis of right knee    Other orders  -     XR KNEE (3 VIEWS), RIGHT (CPT=73562); Future        Assessment: Well-functioning right  total knee arthroplasty    Plan: I recommended continued quadricep strengthening and terminal extension.  Continue icing to the anterior knee if there is any discomfort.  Encouraged scar massage.  Follow-up in 2 years with x-rays.  Daughter was present for the visit today.    Follow Up: Return in about 2 years (around 5/16/2026).    ELLE BROWN MD

## 2024-05-20 NOTE — ED QUICK NOTES
Orders for admission, patient is aware of plan and ready to go upstairs. Any questions, please call ED RN Celine at extension [phone].     Patient Covid vaccination status: Fully vaccinated     COVID Test Ordered in ED: None    COVID Suspicion at Admission: N/A    Running Infusions:  None    Mental Status/LOC at time of transport: x4    Other pertinent information: New Zealander speaking only  CIWA score: N/A   NIH score:  N/A

## 2024-05-20 NOTE — ED INITIAL ASSESSMENT (HPI)
Per EMS, pt had unwitnessed fall at home. Pt was standing on a step stool and fell backwards, c/o left lower back pain. Denies hitting her head, neck pain, dizziness or LOC. Pt is not on any anticoagulants. Primarily Romansh speaking.

## 2024-05-20 NOTE — H&P
[facility]  part of [facility]  HISTORY AND PHYSICAL       ChristianneMani Stahl Patient Status:  Emergency    1945  78 year old CSN [id number]   Location 42 Attending Valeria Brand MD     PCP Kimmy Talbot MD         DATE OF ADMISSION: 2024     CHIEF COMPLAINT: Fall    HISTORY OF PRESENT ILLNESS  This is a 78 year oldfemale who presented after fall at home.  Patient reported that she was climbing on a stepladder when she fell.  Patient fell backwards and believes she hit the table with her back and then landed on the floor.  Patient was adamant she did not hit her head or lose consciousness.  After the fall she began having left upper back pain.  Patient stated pain was worse with breathing.  Pain was also worse with any movement.  Patient presented to the ED for further evaluation.  Patient stated due to the difficulty with taking deep breaths she did note some mild shortness of breath.  Denied cough or fevers.  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 , PROSTHETIC EYE RT        PAST SURGICAL HISTORY  Past Surgical History:   Procedure Laterality Date    Brain surgery  10/31/2016    Right frontal craniotomy for tumor resection    Cholecystectomy      Colonoscopy      at [facility] about 8 years ago    Colonoscopy N/A 2/15/2023    Procedure: COLONOSCOPY;  Surgeon: Radha Sr MD;   Location: [facility] ENDOSCOPY    Hysterectomy      Knee surgery Right 10/2022    Other surgical history      Bionic Eye    Other surgical history      lymphoma biopsy    Other surgical history      3 RT hand surgeries for broken fingers     Tubal ligation         ALLERGIES   Dust mite extract, Latex, Latex, and Seasonal    MEDICATIONS  Patient's Medications   New Prescriptions    No medications on file   Previous Medications    ALENDRONATE 70 MG ORAL TAB    Take 1 tablet (70 mg total) by mouth every 7 days.    CELECOXIB 200 MG ORAL CAP    Take 1 capsule (200 mg total) by mouth 2 (two) times daily.    CINACALCET 30 MG ORAL TAB    Take 0.5 tablets (15 mg total) by mouth daily.    DICLOFENAC 1 % EXTERNAL GEL    Apply 2 g topically 4 (four) times daily.    DICLOFENAC 50 MG ORAL TAB EC    Take 1 tablet (50 mg total) by mouth 2 (two) times daily as needed.    DONEPEZIL 10 MG ORAL TAB    Take 1 tablet (10 mg total) by mouth every evening.    FAMOTIDINE 20 MG ORAL TAB    Take 1 tablet (20 mg total) by mouth 2 (two) times daily.    LISINOPRIL 20 MG ORAL TAB    Take 1 tablet (20 mg total) by mouth daily.    PREGABALIN 100 MG ORAL CAP    Take 1 capsule (100 mg total) by mouth 2 (two) times daily.    SERTRALINE 100 MG ORAL TAB    Take 1 tablet (100 mg total) by mouth nightly.    ZINC OR    Use as directed in the mouth or throat.   Modified Medications    No medications on file   Discontinued Medications    No medications on file       SOCIAL HISTORY  Social History     Socioeconomic History    Marital status:    Tobacco Use    Smoking status: Never     Passive exposure: Never    Smokeless tobacco: Never   Vaping Use    Vaping status: Never Used   Substance and Sexual Activity    Alcohol use: Not Currently     Alcohol/week: 0.0 standard drinks of alcohol    Drug use: No   Other Topics Concern    Caffeine Concern No     Comment: Coffee 1 cup daily    Sleep Concern No    Exercise Yes       FAMILY HISTORY  Family History   Problem  Relation Age of Onset    Cancer Mother         uterine cancer    Arthritis Father         Rheumatoid    Other (Other) Maternal Grandmother     Other (Other) Maternal Grandfather     Other (Other) Paternal Grandmother     Other (Other) Paternal Grandfather     Cancer Cousin         breast cancer    Diabetes Neg     Glaucoma Neg     Macular degeneration Neg        PHYSICAL EXAM  Vital signs:  /65   Pulse 55   Temp 98.4 °F (36.9 °C) (Oral)   Resp 13   Ht 5' (1.524 m)   Wt 150 lb (68 kg)   SpO2 98%   BMI 29.29 kg/m²      GENERAL:  Awake and alert, in mild acute distress.  HEART:  Regular rhythm.  Regular rate   LUNGS:  Air entry was minimally decreased.  No crackles or wheezes   ABDOMEN: Soft and non-tender.    PSYCHIATRIC: Normal mood      IMAGING  CT ABDOMEN+PELVIS(CONTRAST ONLY)(CPT=74177)    Result Date: 5/20/2024  CONCLUSION:   No acute traumatic injury within the abdomen or pelvis.  Left tenth and 11th rib fractures.  Ill-defined area of ground-glass within the left lower lobe suggestive of pulmonary contusion.  Diverticulosis without diverticulitis.  Multiple other incidental findings as described in the body of the report.   Dictated by (CST): Clark Khan MD on 5/20/2024 at 4:03 PM     Finalized by (CST): Clark Khan MD on 5/20/2024 at 4:13 PM          XR RIBS WITH CHEST (3 VIEWS), LEFT  (CPT=71101)    Result Date: 5/20/2024  CONCLUSION:   Acute nondisplaced left posterolateral ninth and 10th rib fractures.  Acute angulated left lateral 11th rib fracture.  Acute nondisplaced left posterior 6th and 7th rib fractures.  Remote left lateral 6th rib fracture.  No pneumothorax    Dictated by (CST): Clark Khan MD on 5/20/2024 at 3:51 PM     Finalized by (CST): Clark Khan MD on 5/20/2024 at 3:53 PM           Data:  Recent Labs   Lab 05/20/24  1439   RBC 4.04   HGB 12.4   HCT 37.5   MCV 92.8   MCH 30.7   MCHC 33.1   RDW 12.8   NEPRELIM 2.79   WBC 5.1   .0*     Recent Labs   Lab 05/20/24  1439    GLU 99   BUN 23   CREATSERUM 1.02   CA 10.4      K 4.4      CO2 27.0       ASSESSMENT/PLAN      Closed fracture of multiple ribs of left side, initial encounter  Pulmonary contusion  -Secondary to fall from stepstool.  -Imaging consistent with acute nondisplaced left 6, 7, 9, 10 and 11 rib fractures.  -CT imaging also noted associated left lower lobe pulmonary contusion  -Starting pain control and lidocaine patch, close monitoring with IV narcotics  -Encouraging incentive spirometry  -PT/OT  -Continue to monitor    HTN  -Elevations noted  -Restart home regimen  - Monitor and adjust as needed    History of dementia  -Restart home donepezil      Plan of care discussed with patient and daughter at bedside.  Discussed with ED physician and RN. Decision made that pt needs hospitalization for further management/monitoring.      Mateo Rdz MD    This note was prepared using Dragon Medical voice recognition dictation software. As a result errors may occur. When identified these errors have been corrected. While every attempt is made to correct errors during dictation discrepancies may still exist

## 2024-05-20 NOTE — ED PROVIDER NOTES
[facility] Emergency Department Note  Patient: Lizbeth Matute Age: 78 year old Sex: female      MRN: [id number]  : 1945    Patient Seen in: [facility] Emergency Department    History     Chief Complaint   Patient presents with    Fall     Stated Complaint: fall    History obtained from: Patient    78-year-old female with past medical history of hypertension, hyperlipidemia, dementia, depression, fibromyalgia, arthritis presenting today for evaluation of left upper back pain.  Patient's daughters are at bedside and provides the history.  States that patient was climbing a stepladder when she fell from the second step.  States that she fell backwards hitting her back on the ground.  Did not hit her head or lose consciousness.  Denies any other injury.  States that she has worsening pain when bending, twisting, breathing.    Review of Systems:  Review of Systems  Positive for stated complaint: fall. Constitutional and vital signs reviewed. All other systems reviewed and negative except as noted above.    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 , PROSTHETIC EYE RT       Past Surgical History:   Procedure Laterality Date    Brain surgery  10/31/2016    Right frontal craniotomy for tumor resection    Cholecystectomy      Colonoscopy      at [facility] about 8 years ago    Colonoscopy N/A 2/15/2023    Procedure: COLONOSCOPY;  Surgeon: Radha Sr MD;  Location: [facility] ENDOSCOPY     Hysterectomy      Knee surgery Right 10/2022    Other surgical history      Bionic Eye    Other surgical history      lymphoma biopsy    Other surgical history      3 RT hand surgeries for broken fingers     Tubal ligation          Family History   Problem Relation Age of Onset    Cancer Mother         uterine cancer    Arthritis Father         Rheumatoid    Other (Other) Maternal Grandmother     Other (Other) Maternal Grandfather     Other (Other) Paternal Grandmother     Other (Other) Paternal Grandfather     Cancer Cousin         breast cancer    Diabetes Neg     Glaucoma Neg     Macular degeneration Neg        Specific Social Determinants of Health:   Social History     Socioeconomic History    Marital status:    Tobacco Use    Smoking status: Never     Passive exposure: Never    Smokeless tobacco: Never   Vaping Use    Vaping status: Never Used   Substance and Sexual Activity    Alcohol use: Not Currently     Alcohol/week: 0.0 standard drinks of alcohol    Drug use: No   Other Topics Concern    Caffeine Concern No     Comment: Coffee 1 cup daily    Sleep Concern No    Exercise Yes   Social History Narrative    The patient does not use an assistive device..      The patient does live in a home with stairs.     Social Determinants of Health     Financial Resource Strain: Medium Risk (5/28/2020)    [facility]    Overall Financial Resource Strain (CARDIA)     Difficulty of Paying Living Expenses: Somewhat hard   Food Insecurity: Food Insecurity Present (5/28/2020)    [facility]    Hunger Vital Sign     Worried About Running Out of Food in the Last Year: Sometimes true     Ran Out of Food in the Last Year: Often true   Transportation Needs: Unmet Transportation Needs (8/25/2020)    [facility]    DAVID  - Transportation     Lack of Transportation (Medical): Yes     Lack of Transportation (Non-Medical): Yes   Physical Activity: Insufficiently Active (5/28/2020)    [facility]    Exercise Vital Sign     Days of Exercise per Week: 1 day     Minutes of Exercise per Session: 30 min   Stress: Stress Concern Present (5/28/2020)    [facility]    [employer] - Occupational Stress Questionnaire     Feeling of Stress : To some extent   Social Connections: Moderately Integrated (5/28/2020)    [facility] from [facility]    Social Connection and Isolation Panel [NHANES]     Frequency of Communication with Friends and Family: More than three times a week     Frequency of Social Gatherings with Friends and Family: Once a week     Attends Restorationism Services: More than 4 times per year     Active Member of Clubs or Organizations: Yes     Attends Club or Organization Meetings: Never     Marital Status:            PSFH elements reviewed from today and agreed except as otherwise stated in HPI.    Physical Exam     ED Triage Vitals [05/20/24 1358]   /65   Pulse 56   Resp 16   Temp 98.4 °F (36.9 °C)   Temp src Oral   SpO2 98 %   O2 Device None (Room air)       Current:/75   Pulse 57   Temp 98.4 °F (36.9 °C) (Oral)   Resp 11   Ht 152.4 cm (5')   Wt 68 kg   SpO2 98%   BMI 29.29 kg/m²         Physical Exam  Constitutional:       General: She is not in acute distress.  HENT:      Head: Normocephalic and atraumatic.      Mouth/Throat:      Mouth: Mucous membranes are moist.   Eyes:      Extraocular Movements: Extraocular movements intact.   Cardiovascular:      Rate and Rhythm: Normal rate and regular rhythm.      Heart sounds: Normal heart sounds.   Pulmonary:      Effort: Pulmonary effort is  normal. No respiratory distress.      Breath sounds: Normal breath sounds.   Abdominal:      Palpations: Abdomen is soft.      Tenderness: There is no abdominal tenderness.   Musculoskeletal:      Cervical back: Normal range of motion and neck supple. No tenderness.      Comments: Left upper flank ecchymoses, no midline back tenderness   Skin:     General: Skin is warm and dry.      Capillary Refill: Capillary refill takes less than 2 seconds.      Findings: No rash.   Neurological:      General: No focal deficit present.      Mental Status: She is alert and oriented to person, place, and time.   Psychiatric:         Mood and Affect: Mood normal.         Behavior: Behavior normal.         ED Course   Labs:   Labs Reviewed   BASIC METABOLIC PANEL (8) - Abnormal; Notable for the following components:       Result Value    BUN/CREA Ratio 22.5 (*)     eGFR-Cr 57 (*)     All other components within normal limits   CBC W/ DIFFERENTIAL - Abnormal; Notable for the following components:    .0 (*)     All other components within normal limits   CBC WITH DIFFERENTIAL WITH PLATELET    Narrative:     The following orders were created for panel order CBC With Differential With Platelet.  Procedure                               Abnormality         Status                     ---------                               -----------         ------                     CBC W/ DIFFERENTIAL[[id number]]          Abnormal            Final result                 Please view results for these tests on the individual orders.     Radiology findings:  I personally reviewed the images.   CT ABDOMEN+PELVIS(CONTRAST ONLY)(CPT=74177)    Result Date: 5/20/2024  CONCLUSION:   No acute traumatic injury within the abdomen or pelvis.  Left tenth and 11th rib fractures.  Ill-defined area of ground-glass within the left lower lobe suggestive of pulmonary contusion.  Diverticulosis without diverticulitis.  Multiple other incidental findings as described  in the body of the report.   Dictated by (CST): Clark Khan MD on 5/20/2024 at 4:03 PM     Finalized by (CST): Clark Khan MD on 5/20/2024 at 4:13 PM          XR RIBS WITH CHEST (3 VIEWS), LEFT  (CPT=71101)    Result Date: 5/20/2024  CONCLUSION:   Acute nondisplaced left posterolateral ninth and 10th rib fractures.  Acute angulated left lateral 11th rib fracture.  Acute nondisplaced left posterior 6th and 7th rib fractures.  Remote left lateral 6th rib fracture.  No pneumothorax    Dictated by (CST): Clark Khan MD on 5/20/2024 at 3:51 PM     Finalized by (CST): Clark Khan MD on 5/20/2024 at 3:53 PM             Cardiac Monitor: Interpreted by me.   Pulse Readings from Last 1 Encounters:   05/20/24 57   , sinus,     External non-ED records reviewed independently by me: Echocardiogram from 8/17/2016 with ejection fraction 55 to 60% with grade 1 diastolic dysfunction.    MDM   78-year-old female with past medical history of hypertension, hyperlipidemia, dementia, depression, fibromyalgia, arthritis presenting for evaluation of left upper back pain after mechanical fall.  Upon arrival to emergency department, she is well-appearing and in no acute distress.  No focal neurologic deficits.  Has ecchymosis to the left upper flank with reproducible tenderness.  No midline neck or back tenderness.    Differential diagnoses considered includes, but is not limited to: Renal contusion, rib fracture, pulmonary contusion, soft tissue contusion    Will obtain the following tests: CBC, BMP, CT abdomen pelvis with IV contrast, x-ray left ribs  Please see ED course for my independent review of these tests/imaging results.    Initial Medications/Therapeutics administered: Morphine, lidocaine patch    Chronic conditions affecting care: Hypertension, hyperlipidemia, dementia, depression, fibromyalgia, arthritis    Workup and medications considered but not ordered: None    Social Determinants of Health that impacted care: None     ED  course: Independent reviewed the CT and pelvis images that show evidence of several closed rib fractures including posterior aspect of left ribs 6, 7, 9, 10, 11.  Also with underlying pulmonary contusion.  Agree with radiology read above.  Given multiple rib fractures and underlying pulmonary contusion, recommended inpatient hospitalization for pain control and respiratory monitoring with incentive spirometry.  Patient's daughters expressed understanding and agreement with this plan.  [facility] patient[facility] the patient [facility].        Disposition and Plan     Clinical Impression:  1. Closed fracture of multiple ribs of left side, initial encounter    2. Contusion of left lung, initial encounter        Disposition:  Admit    Follow-up:  No follow-up provider specified.    Medications Prescribed:  Current Discharge Medication List          Hospital Problems       Present on Admission  Date Reviewed: 5/16/2024            ICD-10-CM Noted POA    * (Principal) Closed fracture of multiple ribs of left side, initial encounter S22.42XA 5/20/2024 Unknown        This note may have been created using voice dictation technology and may include inadvertent errors.      Valeria Brand MD  Emergency Medicine

## 2024-05-21 NOTE — PHYSICAL THERAPY NOTE
PHYSICAL THERAPY EVALUATION - INPATIENT     Room Number: 554/554-A  Evaluation Date: 5/21/2024  Type of Evaluation: Initial   Physician Order: PT Eval and Treat    Presenting Problem: fall off ladder resulting in closed fracture of multiple ribs on L side  Co-Morbidities : TIA, dementia  Reason for Therapy: Mobility Dysfunction and Discharge Planning    PHYSICAL THERAPY ASSESSMENT   Patient is a 78 year old female admitted 5/20/2024 for fall off ladder resulting in closed fracture of multiple ribs on L side.  Prior to admission, patient's baseline is Mod I for ambulation and intermittent use of RW vs cane. Part time caregiver 4 hours/day, 5 days/week assists with laundry, bathing and cleaning. Patient's dtr lives nearby. Reports several frequent falls.  Patient is currently functioning below baseline with bed mobility, transfers, gait, stair negotiation, maintaining seated position, standing prolonged periods, and performing household tasks.  Patient is requiring contact guard assist as a result of the following impairments: decreased functional strength, decreased endurance/aerobic capacity, pain, impaired sitting and standing balance, and cognitive deficits ( ).  Physical Therapy will continue to follow for duration of hospitalization.    Patient will benefit from continued skilled PT Services at discharge to promote functional independence and safety with additional support and return home with home health PT.    PLAN  PT Treatment Plan: Body mechanics;Bed mobility;Endurance;Energy conservation;Patient education;Family education;Gait training;Range of motion;Strengthening;Stoop training;Stair training;Transfer training;Balance training  Rehab Potential : Good  Frequency (Obs): 5x/week    PHYSICAL THERAPY MEDICAL/SOCIAL HISTORY     Problem List  Principal Problem:    Closed fracture of multiple ribs of left side, initial encounter  Active Problems:    Contusion of left lung, initial encounter      HOME  SITUATION  Home Situation  Type of Home: House  Home Layout: Two level;Able to live on main level  Stairs to Enter : 2  Railing: Yes  Stairs to Bedroom: 12  Railing: Yes  Lives With: Alone;Caregiver part-time  Drives: No  Patient Owned Equipment: Rolling walker;Cane  Patient Regularly Uses: Glasses     SUBJECTIVE  \"Pain here\" pointing to L flank    PHYSICAL THERAPY EXAMINATION   OBJECTIVE  Precautions: Bed/chair alarm  Fall Risk: High fall risk    WEIGHT BEARING RESTRICTION  Weight Bearing Restriction: None                PAIN ASSESSMENT  Rating: Unable to rate  Location: L flank  Management Techniques: Activity promotion;Body mechanics;Repositioning    COGNITION  Overall Cognitive Status:  WFL - within functional limits  Following Commands:  follows multistep commands with increased time    RANGE OF MOTION AND STRENGTH ASSESSMENT  Upper extremity ROM and strength are within functional limits   Lower extremity ROM is within functional limits   Lower extremity strength is within functional limits     BALANCE  Static Sitting: Good  Dynamic Sitting: Fair +  Static Standing: Fair -  Dynamic Standing: Poor +    ACTIVITY TOLERANCE  Pulse: 57  Heart Rate Source: Monitor     BP: 118/44  BP Location: Right arm  BP Method: Automatic  Patient Position: Sitting    O2 WALK  Oxygen Therapy  SPO2% on Room Air at Rest: 98  SPO2% Ambulation on Room Air: 96    AM-PAC '6-Clicks' INPATIENT SHORT FORM - BASIC MOBILITY  How much difficulty does the patient currently have...  Patient Difficulty: Turning over in bed (including adjusting bedclothes, sheets and blankets)?: A Little   Patient Difficulty: Sitting down on and standing up from a chair with arms (e.g., wheelchair, bedside commode, etc.): A Little   Patient Difficulty: Moving from lying on back to sitting on the side of the bed?: A Little   How much help from another person does the patient currently need...   Help from Another: Moving to and from a bed to a chair (including a  wheelchair)?: A Little   Help from Another: Need to walk in hospital room?: A Little   Help from Another: Climbing 3-5 steps with a railing?: A Little     AM-PAC Score:  Raw Score: 18   Approx Degree of Impairment: 46.58%   Standardized Score (AM-PAC Scale): 43.63   CMS Modifier (G-Code): CK    FUNCTIONAL ABILITY STATUS  Functional Mobility/Gait Assessment  Gait Assistance: Contact guard assist  Distance (ft): 150ft  Assistive Device: Rolling walker  Pattern: Shuffle (decreased yareli speed, guarded posture. Cues for upright posture and safe management of RW w turns)  Sit to Stand: contact guard assist. Cues for appropriate UE positioning with transitional movements. Instruction on pacing upon standing to allow body time to acclimate to change in position.     Patient received in bathroom with dtr present in room. RN approved activity. Coordinated session with OT. Patient is primarily Kinyarwanda speaking- dtr assisted with translating this session. Therapist educated pt on POC and physiological benefits of mobilization. Cues for pacing and energy conservation techniques and discussed splinting techniques and use of abdominal binder (therapist ordered one from unit distribution) to limit L flank pain. Patient agreeable to participate. Primary complaint is L flank pain that she does not quantify.     Exercise/Education Provided:  Body mechanics  Energy conservation  Functional activity tolerated  Gait training  Posture  ROM  Strengthening  Transfer training    The patient's Approx Degree of Impairment: 46.58% has been calculated based on documentation in the [facility] '6 clicks' Inpatient Basic Mobility Short Form.  Research supports that patients with this level of impairment may benefit from home with HHPT and increased supervision/support.  Final disposition will be made by interdisciplinary medical team.    Patient End of Session: Up in chair;Needs met;Call light within reach;RN aware of session/findings;Alarm  set;Family present    CURRENT GOALS  Goals to be met by: 6/4/24  Patient Goal Patient's self-stated goal is: return to PLOF   Goal #1 Patient is able to demonstrate supine - sit EOB @ level: independent     Goal #1   Current Status    Goal #2 Patient is able to demonstrate transfers Sit to/from Stand at assistance level: modified independent with walker - rolling     Goal #2  Current Status    Goal #3 Patient is able to ambulate 300 feet with assist device: walker - rolling at assistance level: modified independent   Goal #3   Current Status    Goal #4 Patient will negotiate 2 stairs/one curb w/ assistive device and supervision   Goal #4   Current Status    Goal #5 Patient to demonstrate independence with home activity/exercise instructions provided to patient in preparation for discharge.   Goal #5   Current Status      Patient Evaluation Complexity Level:  History Moderate - 1 or 2 personal factors and/or co-morbidities   Examination of body systems Moderate - addressing a total of 3 or more elements   Clinical Presentation  Moderate - Evolving   Clinical Decision Making  Moderate Complexity     Gait Training: 10 minutes  Therapeutic Activity:  13 minutes

## 2024-05-21 NOTE — CM/SW NOTE
05/21/24 1400   CM/SW Referral Data   Referral Source Physician   Reason for Referral Discharge planning   Informant Daughter      Reason for  Pt. is Limited English Proficient   Medical Hx   Does patient have an established PCP? Yes   Patient Info   Patient Communication Issues Language barrier   Patient's Home Environment House   Number of Levels in Home 2   Patient lives with Alone   Patient Status Prior to Admission   Independent with ADLs and Mobility Yes   Services in place prior to admission DME/Supplies at home;[facility]   Type of DME/Supplies Standard Walker;Straight Cane;Shower Chair   senior living Home Care Provider Department on Aging   senior living Care hours per day 4   senior living Care days per week 5   Discharge Needs   Anticipated D/C needs Home health care;Caregiver services   Choice of Post-Acute Provider   Informed patient of right to choose their preferred provider Yes     SW received MDO for discharge planning. RADHA called POA- daughter Maddy for information above. SW introduced self and role to daughter. Daughter provided above information. Daughter informed that patient has Caregiver through state 5 days a week, 4 hours. Daughter is looking to increase the hours but doesn't know the agency name. RADHA called [facility] to see if they can assist with agency information/ or send referral. Pt has hx with [facility] and [facility]. Anticipated therapy need: Home with Home Healthcare. Daughter wishes to use [facility] if possible. SW will sent referral via aidin, f2f placed. Sw will inform liaison of daughter's request.     339pm- [facility] accepted via aidin. SW reserved [facility] per  daughter's request. [facility] liaison made aware     Plan: Pending medical clearance, DC to Home with HH, *list/choice    SW/CM to remain available for support and/or discharge planning.     Devorah Hoyos, MSW, LSW   x [phone]

## 2024-05-21 NOTE — CONSULTS
[facility]  part of [facility]    Report of Consultation    Lizbeth Matute Patient Status:  Inpatient    1945 MRN [id number]   Location [facility] 5SW/SE Attending Luis Matthews MD   Hosp Day # 1 PCP Kimmy Talbot MD     Date of Admission:  2024    Reason for Consultation:   Rib fractures    History of Present Illness:   Patient is a 78-year-old female who presents after mechanical fall.  She states that she fell backward from step stool striking her back against a table landing on floor.  Denies loss of consciousness.  Admits to left-sided chest wall discomfort.  Imaging with evidence of multiple left-sided rib fractures.  Denies significant dyspnea at rest.  Denies history of known lung disease.  Denies fevers chills.  No significant hypoxia.    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 , PROSTHETIC EYE RT       Past Surgical History  Past Surgical History:   Procedure Laterality Date    Brain surgery  10/31/2016    Right frontal craniotomy for tumor resection    Cholecystectomy      Colonoscopy      at [facility] about 8 years ago    Colonoscopy N/A 2/15/2023    Procedure: COLONOSCOPY;  Surgeon: Radha Sr MD;  Location: [facility] ENDOSCOPY    Hysterectomy      Knee surgery Right 10/2022    Other surgical history      Bionic Eye    Other surgical history      lymphoma biopsy    Other surgical history      3 RT hand surgeries for broken fingers      Tubal ligation         Family History  Family History   Problem Relation Age of Onset    Cancer Mother         uterine cancer    Arthritis Father         Rheumatoid    Other (Other) Maternal Grandmother     Other (Other) Maternal Grandfather     Other (Other) Paternal Grandmother     Other (Other) Paternal Grandfather     Cancer Cousin         breast cancer    Diabetes Neg     Glaucoma Neg     Macular degeneration Neg        Social History  Social History     Socioeconomic History    Marital status:    Tobacco Use    Smoking status: Never     Passive exposure: Never    Smokeless tobacco: Never   Vaping Use    Vaping status: Never Used   Substance and Sexual Activity    Alcohol use: Not Currently     Alcohol/week: 0.0 standard drinks of alcohol    Drug use: No   Other Topics Concern    Caffeine Concern No     Comment: Coffee 1 cup daily    Sleep Concern No    Exercise Yes           Current Medications:  Current Facility-Administered Medications   Medication Dose Route Frequency    HYDROcodone-acetaminophen (Norco) 5-325 MG per tab 1 tablet  1 tablet Oral Q8H    lidocaine-menthol 4-1 % patch 1 patch  1 patch Transdermal Daily    donepezil (Aricept) tab 10 mg  10 mg Oral QPM    famotidine (Pepcid) tab 20 mg  20 mg Oral Daily    lidocaine-menthol 4-1 % patch 1 patch  1 patch Transdermal Daily PRN    lisinopril (Prinivil; Zestril) tab 20 mg  20 mg Oral Daily    pregabalin (Lyrica) cap 100 mg  100 mg Oral BID    sertraline (Zoloft) tab 100 mg  100 mg Oral Nightly    heparin (Porcine) 5000 UNIT/ML injection 5,000 Units  5,000 Units Subcutaneous Q8H LIZ    acetaminophen (Tylenol Extra Strength) tab 500 mg  500 mg Oral Q4H PRN    acetaminophen (Tylenol) tab 650 mg  650 mg Oral Q4H PRN    Or    HYDROcodone-acetaminophen (Norco) 5-325 MG per tab 1 tablet  1 tablet Oral Q4H PRN    Or    HYDROcodone-acetaminophen (Norco) 5-325 MG per tab 2 tablet  2 tablet Oral Q4H PRN    morphINE PF 2 MG/ML injection 1 mg  1 mg  Intravenous Q2H PRN    Or    morphINE PF 2 MG/ML injection 2 mg  2 mg Intravenous Q2H PRN    Or    morphINE PF 4 MG/ML injection 4 mg  4 mg Intravenous Q2H PRN    polyethylene glycol (PEG 3350) (Miralax) 17 g oral packet 17 g  17 g Oral Daily PRN    sennosides (Senokot) tab 17.2 mg  17.2 mg Oral Nightly PRN    bisacodyl (Dulcolax) 10 MG rectal suppository 10 mg  10 mg Rectal Daily PRN    fleet enema (Fleet) 7-19 GM/118ML rectal enema 133 mL  1 enema Rectal Once PRN    ondansetron (Zofran) 4 MG/2ML injection 4 mg  4 mg Intravenous Q6H PRN     Medications Prior to Admission   Medication Sig    NIFEdipine ER 60 MG Oral Tablet 24 Hr Take 1 tablet (60 mg total) by mouth daily.    CALCIUM-VITAMIN D OR Take 1 tablet by mouth daily.    diclofenac 75 MG Oral Tab EC Take 1 tablet (75 mg total) by mouth 2 (two) times daily.    Multiple Vitamins-Minerals (MULTIPLE VITAMINS/WOMENS) Oral Tab Take 1 tablet by mouth daily.    Cholecalciferol (VITAMIN D3) 25 MCG (1000 UT) Oral Cap Take 1 tablet by mouth daily.    Turmeric (QC TUMERIC COMPLEX) 500 MG Oral Cap Take 1 tablet by mouth 2 (two) times daily.    Fexofenadine HCl (ALLERGY 24-HR OR) Take 1 tablet by mouth daily.    pregabalin 100 MG Oral Cap Take 1 capsule (100 mg total) by mouth 2 (two) times daily.    diclofenac 1 % External Gel Apply 2 g topically 4 (four) times daily. (Patient taking differently: Apply 2 g topically 4 (four) times daily. To right knee)    alendronate 70 MG Oral Tab Take 1 tablet (70 mg total) by mouth every 7 days. (Patient taking differently: Take 1 tablet (70 mg total) by mouth every 7 days. On fridays)    cinacalcet 30 MG Oral Tab Take 0.5 tablets (15 mg total) by mouth daily.    donepezil 10 MG Oral Tab Take 1 tablet (10 mg total) by mouth every evening.    famotidine 20 MG Oral Tab Take 1 tablet (20 mg total) by mouth 2 (two) times daily.    lisinopril 20 MG Oral Tab Take 1 tablet (20 mg total) by mouth daily.    sertraline 100 MG Oral Tab Take 1  tablet (100 mg total) by mouth nightly.    ZINC OR Take 1 tablet by mouth daily.    diclofenac 0.1 % Ophthalmic Solution Place 1 drop into the left eye daily as needed.    [] lidocaine 5 % External Patch Place 1 patch onto the skin daily for 7 days.       Allergies  Allergies   Allergen Reactions    Dust Mite Extract Runny nose    Latex RASH     itching    Latex ITCHING     If exposed for long time     Seasonal UNKNOWN       Review of Systems:   Constitutional: denies fevers, chills, weakness, fatigue, recent illness  HEENT: denies headache, sore throat, vision loss  Cardio: denies chest pain, chest pressure, palpitations  Respiratory: denies dyspnea, cough, wheezing, hemoptysis   GI: denies nausea, vomiting, abdominal pain  : denies dysuria, hematuria  Musculoskeletal: Left-sided chest wall pain  Integumentary: denies rash, itching  Neurological: denies syncope, weakness, dizziness,   Psychiatric: denies depression, anxiety  Hematologic: denies bruising        Physical Exam:   Blood pressure 118/44, pulse 64, temperature 98.1 °F (36.7 °C), temperature source Oral, resp. rate 16, height 5' (1.524 m), weight 130 lb (59 kg), SpO2 97%, not currently breastfeeding.    Constitutional: no acute distress  Eyes: PERRL  ENT: nares patent  Neck: neck supple, no JVD  Cardio: RRR, S1 S2  Respiratory: clear to auscultation bilaterally, no wheezing, rales, rhonchi, crackles  GI: abdomen soft, non tender, active bowel souds, no organomegaly  Extremities: no clubbing, cyanosis, edema  Neurologic: no gross motor deficits  Skin: warm, dry    Results:   Laboratory Data  Lab Results   Component Value Date    WBC 4.9 2024    HGB 11.4 (L) 2024    HCT 36.1 2024    .0 (L) 2024    CREATSERUM 0.84 2024    BUN 18 2024     2024    K 4.1 2024     2024    CO2 28.0 2024    GLU 84 2024    CA 9.3 2024    ALB 3.9 2023    ALKPHO 109 2023     TP 7.3 12/12/2023    AST 20 12/12/2023    ALT 10 12/12/2023    PTT 25.4 10/01/2022    INR 0.97 10/01/2022    PTP 12.8 10/01/2022    TSH 1.905 12/12/2023     01/14/2023    ESRML 18 10/25/2016    MG 1.8 05/21/2024    PHOS 2.0 (L) 05/25/2023         Imaging  CT ABDOMEN+PELVIS(CONTRAST ONLY)(CPT=74177)    Result Date: 5/20/2024  CONCLUSION:   No acute traumatic injury within the abdomen or pelvis.  Left tenth and 11th rib fractures.  Ill-defined area of ground-glass within the left lower lobe suggestive of pulmonary contusion.  Diverticulosis without diverticulitis.  Multiple other incidental findings as described in the body of the report.   Dictated by (CST): Clark Khan MD on 5/20/2024 at 4:03 PM     Finalized by (CST): Clark Khan MD on 5/20/2024 at 4:13 PM          XR RIBS WITH CHEST (3 VIEWS), LEFT  (CPT=71101)    Result Date: 5/20/2024  CONCLUSION:   Acute nondisplaced left posterolateral ninth and 10th rib fractures.  Acute angulated left lateral 11th rib fracture.  Acute nondisplaced left posterior 6th and 7th rib fractures.  Remote left lateral 6th rib fracture.  No pneumothorax    Dictated by (CST): Clark Khan MD on 5/20/2024 at 3:51 PM     Finalized by (CST): Clark Khan MD on 5/20/2024 at 3:53 PM           Assessment   1.  Left-sided rib fractures  2.  Status post fall  3.  Likely pulmonary contusion  4.  Hypertension  5.  Dementia    Plan   -Patient presents after mechanical fall.  Chest ribs with evidence of left-sided sixth through 11th rib fractures seen.  CT abdomen pelvis with left lower lobe findings suggestive of pulmonary contusion.  Imaging with no evidence of significant pleural effusion or pneumothorax seen.  -No significant hypoxia.  Monitor respiratory status closely.  -Incentive spirometry  -Pain control  -PT/OT  -Activity as tolerated  -Discussed with family  -Reviewed vitals, labs imaging    Johnny Soto DO  Pulmonary Critical Care Medicine  [facility]  5/21/2024  4:22  PM

## 2024-05-21 NOTE — CONSULTS
Spiritual Care Visit Note    Patient Name: Lizbeth Matute Date of Spiritual Care Visit: 24   : 1945 Primary Dx: Closed fracture of multiple ribs of left side, initial encounter       Referred By: Referral From: Nurse;Patient    Spiritual Care Taxonomy:    Intended Effects: Aligning care plan with patient's values    Methods: Collaborate with care team member;Offer support    Interventions: Active listening;Ask guided questions;Assist someone with Advance Directives    Visit Type/Summary:     - PoA: New PoAH Created: Visited patient in response to PoA for Healthcare consult/request. Writer used language line to help patient complete PoAH. Created a new PoAH for Healthcare document that, per the patient, accurately reflects their wishes. Patient named daughter, Maddy Stein ([phone]) as primary agent and Prosper Mendez [phone]) as second. Gave the patient the original PoAH document along with copies. Confirmed that the PoAH primary agent name and contact information have been entered into the Epic, Advance Directives section. A copy of the new PoAH document has been placed on the patient's paper chart. Writer asked patient if she would like to be added to communion. Patient agreed. Writer added patient to communion list. No other need at this time.    Spiritual Care support can be requested via an Epic consult. For urgent/immediate needs, please contact the On Call  at: [place]: ext [phone]    GREGORY Evans   [phone]

## 2024-05-21 NOTE — OCCUPATIONAL THERAPY NOTE
OCCUPATIONAL THERAPY EVALUATION - INPATIENT     Room Number: 554/554-A  Evaluation Date: 5/21/2024  Type of Evaluation: Initial  Presenting Problem: falls; rib fractures    Physician Order: IP Consult to Occupational Therapy  Reason for Therapy: ADL/IADL Dysfunction and Discharge Planning    OCCUPATIONAL THERAPY ASSESSMENT   Patient is a 78 year old female admitted 5/20/2024 for fall; multiple L sided rib fractures.  Prior to admission, patient's baseline is home alone with part time caregiver (PATIENT WOULD BENEFIT FROM INCREASED CAREGIVER SUPPORT AND SUPERVISION) .  Patient is currently functioning NEAR baseline with self care, functional mobility and transfers.  Patient is requiring up to CGA as a result of the following impairments: balance, mobility, safety, activity tolerance. Occupational Therapy will continue to follow for duration of hospitalization.    Patient will benefit from continued skilled OT Services at discharge to promote functional independence and safety with additional support and return home with home health OT    PLAN  OT Treatment Plan: Balance activities;Energy conservation/work simplification techniques;ADL training;Functional transfer training;Endurance training;Patient/Family training;Patient/Family education;Compensatory technique education  OT Device Recommendations: TBD    OCCUPATIONAL THERAPY MEDICAL/SOCIAL HISTORY   Problem List  Principal Problem:    Closed fracture of multiple ribs of left side, initial encounter  Active Problems:    Contusion of left lung, initial encounter    HOME SITUATION  Type of Home: House  Home Layout: Two level; Able to live on main level  Lives With: Alone; Caregiver part-time  Toilet and Equipment: Standard height toilet  Shower/Tub and Equipment: Tub-shower combo; Shower chair  Drives: No  Patient Regularly Uses: Glasses    SUBJECTIVE  Thank you    OCCUPATIONAL THERAPY EXAMINATION    OBJECTIVE  Precautions: Bed/chair alarm  Fall Risk: High fall  risk    PAIN ASSESSMENT  Ratin  Location: ribs; L side  Management Techniques: Activity promotion; Repositioning    ACTIVITY TOLERANCE  Pulse: 64        BP: 118/44             O2 SATURATIONS  Oxygen Therapy  SPO2% on Room Air at Rest: 97  SPO2% Ambulation on Room Air: 96    Communication: WNL- Fijian Speaking    Behavioral/Emotional/Social: WNL    RANGE OF MOTION   Upper extremity ROM is within functional limits     STRENGTH ASSESSMENT  Upper extremity strength is within functional limits     COORDINATION  Gross Motor: WFL   Fine Motor: WFL     ACTIVITIES OF DAILY LIVING ASSESSMENT  AM-PAC ‘6-Clicks’ Inpatient Daily Activity Short Form  How much help from another person does the patient currently need…  -   Putting on and taking off regular lower body clothing?: A Little  -   Bathing (including washing, rinsing, drying)?: A Little  -   Toileting, which includes using toilet, bedpan or urinal? : A Little  -   Putting on and taking off regular upper body clothing?: A Little  -   Taking care of personal grooming such as brushing teeth?: A Little  -   Eating meals?: A Little    AM-PAC Score:  Score: 18  Approx Degree of Impairment: 46.65%  Standardized Score (AM-PAC Scale): 38.66  CMS Modifier (G-Code): CK    FUNCTIONAL TRANSFER ASSESSMENT  Sit to Stand: Chair  Chair: Stand-by Assist  Toilet Transfer: Stand-by Assist    BED MOBILITY     BALANCE ASSESSMENT  Static Sitting: Stand-by Assist  Static Standing: Contact Guard Assist    FUNCTIONAL ADL ASSESSMENT  Eating: Independent  Grooming Seated: Stand-by Assist  Bathing Seated: Stand-by Assist  UB Dressing Seated: Stand-by Assist  LB Dressing Seated: Contact Guard Assist  Toileting Seated: Stand-by Assist       Skilled Therapy Provided: {Patient up and ambulatory to bathroom; completing toileting tasks with SBA, toilet t/f with SBA, ambulatory wit RW with cues to maintain contact while up and engaging in functional mobility;after resting, pt was able to engage in  additional functional >household distance with CGA/SBA. Patient left up in chair with all needs in reach.     Discussed with daughter rec for life alert system and increased support at home    EDUCATION PROVIDED  Patient: Role of Occupational Therapy; Plan of Care; Discharge Recommendations  Patient's Response to Education: Verbalized Understanding    The patient's Approx Degree of Impairment: 46.65% has been calculated based on documentation in the [facility] '6 clicks' Inpatient Daily Activity Short Form.  Research supports that patients with this level of impairment may benefit from HH and increased support.  Final disposition will be made by interdisciplinary medical team.     Patient End of Session: Up in chair;Needs met;Call light within reach;RN aware of session/findings;All patient questions and concerns addressed;Alarm set;Family present    OT Goals  Patients self stated goal is: to return home     Patient will complete functional transfer with supervision  Comment:     Patient will complete toileting with supervision  Comment:     Patient will tolerate standing for 3-5 minutes in prep for adls with supervision   Comment:     Comment:          Goals  on:    Frequency: 3x week     Patient Evaluation Complexity Level:   Occupational Profile/Medical History LOW - Brief history including review of medical or therapy records    Specific performance deficits impacting engagement in ADL/IADL LOW  1 - 3 performance deficits    Client Assessment/Performance Deficits LOW - No comorbidities nor modifications of tasks    Clinical Decision Making LOW - Analysis of occupational profile, problem-focused assessments, limited treatment options    Overall Complexity LOW     OT Session Time: 23 minutes  Self-Care Home Management: 8 minutes  Therapeutic Activity: 15 minutes    Jerod Wray, Occupational Therapist, OTR/L ext [phone]         Can add/delete any of these

## 2024-05-21 NOTE — PLAN OF CARE
Problem: Patient Centered Care  Goal: Patient preferences are identified and integrated in the patient's plan of care  Description: Interventions:  - What would you like us to know as we care for you? From home alone. Has a caregiver Monday through Friday for 4 hours.   - Provide timely, complete, and accurate information to patient/family  - Incorporate patient and family knowledge, values, beliefs, and cultural backgrounds into the planning and delivery of care  - Encourage patient/family to participate in care and decision-making at the level they choose  - Honor patient and family perspectives and choices  Outcome: Progressing     Problem: Patient/Family Goals  Goal: Patient/Family Long Term Goal  Description: Patient's Long Term Goal: Discharge from the hospital    Interventions:  - Monitor vital signs  - Monitor appropriate labs  - Pain management  - Administer medications per order  - Follow MD orders  - Diagnostics per order  - Update / inform patient and family on plan of care  - Discharge planning  - See additional Care Plan goals for specific interventions  Outcome: Progressing  Goal: Patient/Family Short Term Goal  Description: Patient's Short Term Goal: Improve rib pain     Interventions:   - Monitor vital signs  - Monitor appropriate labs  - Pain management  - Administer medications per order  - Follow MD orders  - Diagnostics per order  - Update / inform patient and family on plan of care  - See additional Care Plan goals for specific interventions  Outcome: Progressing     Problem: PAIN - ADULT  Goal: Verbalizes/displays adequate comfort level or patient's stated pain goal  Description: INTERVENTIONS:  - Encourage pt to monitor pain and request assistance  - Assess pain using appropriate pain scale  - Administer analgesics based on type and severity of pain and evaluate response  - Implement non-pharmacological measures as appropriate and evaluate response  - Consider cultural and social influences  on pain and pain management  - Manage/alleviate anxiety  - Utilize distraction and/or relaxation techniques  - Monitor for opioid side effects  - Notify MD/LIP if interventions unsuccessful or patient reports new pain  - Anticipate increased pain with activity and pre-medicate as appropriate  Outcome: Progressing     Problem: SAFETY ADULT - FALL  Goal: Free from fall injury  Description: INTERVENTIONS:  - Assess pt frequently for physical needs  - Identify cognitive and physical deficits and behaviors that affect risk of falls.  - [place] fall precautions as indicated by assessment.  - Educate pt/family on patient safety including physical limitations  - Instruct pt to call for assistance with activity based on assessment  - Modify environment to reduce risk of injury  - Provide assistive devices as appropriate  - Consider OT/PT consult to assist with strengthening/mobility  - Encourage toileting schedule  Outcome: Progressing     Problem: DISCHARGE PLANNING  Goal: Discharge to home or other facility with appropriate resources  Description: INTERVENTIONS:  - Identify barriers to discharge w/pt and caregiver  - Include patient/family/discharge partner in discharge planning  - Arrange for needed discharge resources and transportation as appropriate  - Identify discharge learning needs (meds, wound care, etc)  - Arrange for interpreters to assist at discharge as needed  - Consider post-discharge preferences of patient/family/discharge partner  - Complete POLST form as appropriate  - Assess patient's ability to be responsible for managing their own health  - Refer to Case Management Department for coordinating discharge planning if the patient needs post-hospital services based on physician/LIP order or complex needs related to functional status, cognitive ability or social support system  Outcome: Progressing     Problem: Altered Communication/Language Barrier  Goal: Patient/Family is able to understand and  participate in their care  Description: Interventions:  - Assess communication ability and preferred communication style  - Implement communication aides and strategies  - Use visual cues when possible  - Listen attentively, be patient, do not interrupt  - Minimize distractions  - Allow time for understanding and response  - Establish method for patient to ask for assistance (call light)  - Provide an  as needed  - Communicate barriers and strategies to overcome with those who interact with patient  Outcome: Progressing     Problem: SKIN/TISSUE INTEGRITY - ADULT  Goal: Skin integrity remains intact  Description: INTERVENTIONS  - Assess and document risk factors for pressure ulcer development  - Assess and document skin integrity  - Monitor for areas of redness and/or skin breakdown  - Initiate interventions, skin care algorithm/standards of care as needed  Outcome: Progressing     Problem: MUSCULOSKELETAL - ADULT  Goal: Return mobility to safest level of function  Description: INTERVENTIONS:  - Assess patient stability and activity tolerance for standing, transferring and ambulating w/ or w/o assistive devices  - Assist with transfers and ambulation using safe patient handling equipment as needed  - Ensure adequate protection for wounds/incisions during mobilization  - Obtain PT/OT consults as needed  - Advance activity as appropriate  - Communicate ordered activity level and limitations with patient/family  Outcome: Progressing     Problem: Impaired Functional Mobility  Goal: Achieve highest/safest level of mobility/gait  Description: Interventions:  - Assess patient's functional ability and stability  - Promote increasing activity/tolerance for mobility and gait  - Educate and engage patient/family in tolerated activity level and precautions  - Recommend use of  RW for transfers and ambulation  - Recommend patient transfer to bedside chair toward strongest side  - When transferring patient, block weaker  knee for safety  - Recommend use of chair position in bed 3 times per day  Outcome: Progressing     Problem: Impaired Activities of Daily Living  Goal: Achieve highest/safest level of independence in self care  Description: Interventions:  - Assess ability and encourage patient to participate in ADLs to maximize function  - Promote sitting position while performing ADLs such as feeding, grooming, and bathing  - Educate and encourage patient/family in tolerated functional activity level and precautions during self-care  Outcome: Progressing      Monitoring vital signs- stable at this time. Pain medication provided as needed. No acute changes noted at this moment. Safety and fall precautions maintained- bed alarm on, bed locked in lowest position, call light within reach. Frequent rounding by nursing staff.

## 2024-05-21 NOTE — PROGRESS NOTES
Progress Note     Lizbeth Matute Patient Status:  Inpatient    1945 MRN [id number]   Location [facility] 5SW/SE Attending Luis Matthews MD   Hosp Day # 1 PCP Kimmy Talbot MD     Chief Complaint: fall    Subjective:   S: Patient here with left-sided rib fractures and pulmonary contusion  States her pain is 5 out of 10 at rest  Has tolerable pain with movement  Discussed scheduling pain medications and increasing activities  Her biggest concern is if there is a chance to increase the amount of time she has caregivers at her home    Review of Systems:   10 point ROS completed and was negative, except for pertinent positive and negatives stated in subjective.    Objective:   Vital signs:  Temp:  [97.6 °F (36.4 °C)-98.2 °F (36.8 °C)] 98.1 °F (36.7 °C)  Pulse:  [53-64] 64  Resp:  [11-16] 16  BP: (109-151)/(44-75) 118/44  SpO2:  [94 %-98 %] 97 %    Wt Readings from Last 6 Encounters:   24 130 lb (59 kg)   24 129 lb (58.5 kg)   24 130 lb (59 kg)   24 140 lb (63.5 kg)   24 128 lb (58.1 kg)   24 132 lb (59.9 kg)         Physical Exam:    General: No acute distress. Alert ,         Respiratory: Clear to auscultation bilaterally. No wheezes. No rhonchi.  Cardiovascular: S1, S2. Regular rate and rhythm. No murmurs, rubs or gallops.   Abdomen: Soft, nontender, nondistended.  Positive bowel sounds. No rebound or guarding.  Neurologic: No focal neurological deficits.   Musculoskeletal: Moves all extremities.  Extremities: No edema.    Results:   Diagnostic Data:      Labs:    Labs Last 24 Hours:   BMP     CBC    Other     Na 139 Cl 109 BUN 18 Glu 84   Hb 11.4   PTT - Procal -   K 4.1 CO2 28.0 Cr 0.84   WBC 4.9 >< .0  INR - CRP -   Renal Lytes Endo    Hct 36.1   Trop - D dim -   eGFR - Ca 9.3 POC Gluc  -    LFT   pBNP - Lactic -   eGFR AA - PO4 - A1c -   AST - APk - Prot -  LDL -     Mg 1.8 TSH -   ALT - T danelle - Alb -        COVID-19 Lab  Results    COVID-19  Lab Results   Component Value Date    COVID19 Not Detected 10/14/2022       Pro-Calcitonin  No results for input(s): \"PCT\" in the last 168 hours.    Cardiac  No results for input(s): \"TROP\", \"PBNP\" in the last 168 hours.    Creatinine Kinase  No results for input(s): \"CK\" in the last 168 hours.    Inflammatory Markers  No results for input(s): \"CRP\", \"KATHARINE\", \"LDH\", \"DDIMER\" in the last 168 hours.    Imaging: Imaging data reviewed in Epic.    Medications:    HYDROcodone-acetaminophen  1 tablet Oral Q8H    lidocaine-menthol  1 patch Transdermal Daily    donepezil  10 mg Oral QPM    famotidine  20 mg Oral Daily    lisinopril  20 mg Oral Daily    pregabalin  100 mg Oral BID    sertraline  100 mg Oral Nightly    heparin  5,000 Units Subcutaneous Q8H [facility]       Assessment & Plan:   ASSESSMENT / PLAN:       Closed fracture of multiple ribs of left side, initial encounter  Pulmonary contusion  -Secondary to fall from stepstool.  -Imaging consistent with acute nondisplaced left 6, 7, 9, 10 and 11 rib fractures.  -CT imaging also noted associated left lower lobe pulmonary contusion  -Starting pain control and lidocaine patch, close monitoring with IV narcotics  -Encouraging incentive spirometry  -PT/OT  -Continue to monitor  -schedule norco     HTN  -Elevations noted  -Restart home regimen  - Monitor and adjust as needed     History of dementia  -Restart home donepezil         Quality:  DVT Prophylaxis: hep sq  CODE status: full  Pulido:    Central line: n/a  Dispo: further recs pending clinical course      Will the patient be referred to [facility] on discharge?: yes  Estimated date of discharge: TBD  Discharge is dependent on: clinical improvement  At this point Ms. Walker is expected to be discharge to: home vs. rehab    Plan of care discussed with patient, her daughter at bedside and nursing    Outpatient records or previous hospital records reviewed.   Patient and/or patient's family given opportunity to ask  questions and note understanding and agreeing with therapeutic plan as outlined     Coordinated care with providers and counseling re: treatment plan and workup    MDM: High complexity     NATALIO BOND MD    Supplementary Documentation:         **Certification      PHYSICIAN Certification of Need for Inpatient Hospitalization - Initial Certification    Patient will require inpatient services that will reasonably be expected to span two midnight's based on the clinical documentation in H+P.   Based on patients current state of illness, I anticipate that, after discharge, patient will require TBD.

## 2024-05-21 NOTE — PLAN OF CARE
Patient and daughter/POA updated on plan of care. PRN Norco given as needed for pain. Participated in therapy. Up in chair as tolerated. Started on scheduled Norco q8h. Plan to dc back home once medically cleared. Call light within reach.     Problem: Patient Centered Care  Goal: Patient preferences are identified and integrated in the patient's plan of care  Description: Interventions:  - What would you like us to know as we care for you? From home alone. Has a caregiver Monday through Friday for 4 hours.   - Provide timely, complete, and accurate information to patient/family  - Incorporate patient and family knowledge, values, beliefs, and cultural backgrounds into the planning and delivery of care  - Encourage patient/family to participate in care and decision-making at the level they choose  - Honor patient and family perspectives and choices  Outcome: Progressing     Problem: Patient/Family Goals  Goal: Patient/Family Long Term Goal  Description: Patient's Long Term Goal: Discharge from the hospital    Interventions:  - Monitor vital signs  - Monitor appropriate labs  - Pain management  - Administer medications per order  - Follow MD orders  - Diagnostics per order  - Update / inform patient and family on plan of care  - Discharge planning  - See additional Care Plan goals for specific interventions  Outcome: Progressing  Goal: Patient/Family Short Term Goal  Description: Patient's Short Term Goal: Improve rib pain     Interventions:   - Monitor vital signs  - Monitor appropriate labs  - Pain management  - Administer medications per order  - Follow MD orders  - Diagnostics per order  - Update / inform patient and family on plan of care  - See additional Care Plan goals for specific interventions  Outcome: Progressing     Problem: PAIN - ADULT  Goal: Verbalizes/displays adequate comfort level or patient's stated pain goal  Description: INTERVENTIONS:  - Encourage pt to monitor pain and request assistance  -  Assess pain using appropriate pain scale  - Administer analgesics based on type and severity of pain and evaluate response  - Implement non-pharmacological measures as appropriate and evaluate response  - Consider cultural and social influences on pain and pain management  - Manage/alleviate anxiety  - Utilize distraction and/or relaxation techniques  - Monitor for opioid side effects  - Notify MD/LIP if interventions unsuccessful or patient reports new pain  - Anticipate increased pain with activity and pre-medicate as appropriate  Outcome: Progressing     Problem: SAFETY ADULT - FALL  Goal: Free from fall injury  Description: INTERVENTIONS:  - Assess pt frequently for physical needs  - Identify cognitive and physical deficits and behaviors that affect risk of falls.  - [place] fall precautions as indicated by assessment.  - Educate pt/family on patient safety including physical limitations  - Instruct pt to call for assistance with activity based on assessment  - Modify environment to reduce risk of injury  - Provide assistive devices as appropriate  - Consider OT/PT consult to assist with strengthening/mobility  - Encourage toileting schedule  Outcome: Progressing     Problem: DISCHARGE PLANNING  Goal: Discharge to home or other facility with appropriate resources  Description: INTERVENTIONS:  - Identify barriers to discharge w/pt and caregiver  - Include patient/family/discharge partner in discharge planning  - Arrange for needed discharge resources and transportation as appropriate  - Identify discharge learning needs (meds, wound care, etc)  - Arrange for interpreters to assist at discharge as needed  - Consider post-discharge preferences of patient/family/discharge partner  - Complete POLST form as appropriate  - Assess patient's ability to be responsible for managing their own health  - Refer to Case Management Department for coordinating discharge planning if the patient needs post-hospital services based  on physician/LIP order or complex needs related to functional status, cognitive ability or social support system  Outcome: Progressing     Problem: Altered Communication/Language Barrier  Goal: Patient/Family is able to understand and participate in their care  Description: Interventions:  - Assess communication ability and preferred communication style  - Implement communication aides and strategies  - Use visual cues when possible  - Listen attentively, be patient, do not interrupt  - Minimize distractions  - Allow time for understanding and response  - Establish method for patient to ask for assistance (call light)  - Provide an  as needed  - Communicate barriers and strategies to overcome with those who interact with patient  Outcome: Progressing     Problem: SKIN/TISSUE INTEGRITY - ADULT  Goal: Skin integrity remains intact  Description: INTERVENTIONS  - Assess and document risk factors for pressure ulcer development  - Assess and document skin integrity  - Monitor for areas of redness and/or skin breakdown  - Initiate interventions, skin care algorithm/standards of care as needed  Outcome: Progressing     Problem: MUSCULOSKELETAL - ADULT  Goal: Return mobility to safest level of function  Description: INTERVENTIONS:  - Assess patient stability and activity tolerance for standing, transferring and ambulating w/ or w/o assistive devices  - Assist with transfers and ambulation using safe patient handling equipment as needed  - Ensure adequate protection for wounds/incisions during mobilization  - Obtain PT/OT consults as needed  - Advance activity as appropriate  - Communicate ordered activity level and limitations with patient/family  Outcome: Progressing     Problem: Impaired Functional Mobility  Goal: Achieve highest/safest level of mobility/gait  Description: Interventions:  - Assess patient's functional ability and stability  - Promote increasing activity/tolerance for mobility and gait  - Educate  and engage patient/family in tolerated activity level and precautions  - Recommend use of  RW for transfers and ambulation  Outcome: Progressing     Problem: Impaired Activities of Daily Living  Goal: Achieve highest/safest level of independence in self care  Description: Interventions:  - Assess ability and encourage patient to participate in ADLs to maximize function  - Promote sitting position while performing ADLs such as feeding, grooming, and bathing  - Educate and encourage patient/family in tolerated functional activity level and precautions during self-care  - Provide support under elbow of weak side to prevent shoulder subluxation  - Encourage patient to incorporate impaired side during daily activities to promote function  Outcome: Progressing

## 2024-05-22 NOTE — PLAN OF CARE
Patient is Aox4, vitals stable on room air and meds given as ordered. Pain improved toady. Discharge orders in for patient, went over AVS with patient and daughter at bedside and patient left with daughter to home. IV removed. Patient left with all belongings to home.   Problem: Patient Centered Care  Goal: Patient preferences are identified and integrated in the patient's plan of care  Description: Interventions:  - What would you like us to know as we care for you? From home alone. Has a caregiver Monday through Friday for 4 hours.   - Provide timely, complete, and accurate information to patient/family  - Incorporate patient and family knowledge, values, beliefs, and cultural backgrounds into the planning and delivery of care  - Encourage patient/family to participate in care and decision-making at the level they choose  - Honor patient and family perspectives and choices  Outcome: Progressing     Problem: Patient/Family Goals  Goal: Patient/Family Long Term Goal  Description: Patient's Long Term Goal: Discharge from the hospital    Interventions:  - Monitor vital signs  - Monitor appropriate labs  - Pain management  - Administer medications per order  - Follow MD orders  - Diagnostics per order  - Update / inform patient and family on plan of care  - Discharge planning  - See additional Care Plan goals for specific interventions  Outcome: Progressing  Goal: Patient/Family Short Term Goal  Description: Patient's Short Term Goal: Improve rib pain     Interventions:   - Monitor vital signs  - Monitor appropriate labs  - Pain management  - Administer medications per order  - Follow MD orders  - Diagnostics per order  - Update / inform patient and family on plan of care  - See additional Care Plan goals for specific interventions  Outcome: Progressing     Problem: PAIN - ADULT  Goal: Verbalizes/displays adequate comfort level or patient's stated pain goal  Description: INTERVENTIONS:  - Encourage pt to monitor pain  and request assistance  - Assess pain using appropriate pain scale  - Administer analgesics based on type and severity of pain and evaluate response  - Implement non-pharmacological measures as appropriate and evaluate response  - Consider cultural and social influences on pain and pain management  - Manage/alleviate anxiety  - Utilize distraction and/or relaxation techniques  - Monitor for opioid side effects  - Notify MD/LIP if interventions unsuccessful or patient reports new pain  - Anticipate increased pain with activity and pre-medicate as appropriate  Outcome: Progressing     Problem: SAFETY ADULT - FALL  Goal: Free from fall injury  Description: INTERVENTIONS:  - Assess pt frequently for physical needs  - Identify cognitive and physical deficits and behaviors that affect risk of falls.  - [place] fall precautions as indicated by assessment.  - Educate pt/family on patient safety including physical limitations  - Instruct pt to call for assistance with activity based on assessment  - Modify environment to reduce risk of injury  - Provide assistive devices as appropriate  - Consider OT/PT consult to assist with strengthening/mobility  - Encourage toileting schedule  Outcome: Progressing     Problem: DISCHARGE PLANNING  Goal: Discharge to home or other facility with appropriate resources  Description: INTERVENTIONS:  - Identify barriers to discharge w/pt and caregiver  - Include patient/family/discharge partner in discharge planning  - Arrange for needed discharge resources and transportation as appropriate  - Identify discharge learning needs (meds, wound care, etc)  - Arrange for interpreters to assist at discharge as needed  - Consider post-discharge preferences of patient/family/discharge partner  - Complete POLST form as appropriate  - Assess patient's ability to be responsible for managing their own health  - Refer to Case Management Department for coordinating discharge planning if the patient needs  post-hospital services based on physician/LIP order or complex needs related to functional status, cognitive ability or social support system  Outcome: Progressing     Problem: Altered Communication/Language Barrier  Goal: Patient/Family is able to understand and participate in their care  Description: Interventions:  - Assess communication ability and preferred communication style  - Implement communication aides and strategies  - Use visual cues when possible  - Listen attentively, be patient, do not interrupt  - Minimize distractions  - Allow time for understanding and response  - Establish method for patient to ask for assistance (call light)  - Provide an  as needed  - Communicate barriers and strategies to overcome with those who interact with patient  Outcome: Progressing     Problem: SKIN/TISSUE INTEGRITY - ADULT  Goal: Skin integrity remains intact  Description: INTERVENTIONS  - Assess and document risk factors for pressure ulcer development  - Assess and document skin integrity  - Monitor for areas of redness and/or skin breakdown  - Initiate interventions, skin care algorithm/standards of care as needed  Outcome: Progressing     Problem: MUSCULOSKELETAL - ADULT  Goal: Return mobility to safest level of function  Description: INTERVENTIONS:  - Assess patient stability and activity tolerance for standing, transferring and ambulating w/ or w/o assistive devices  - Assist with transfers and ambulation using safe patient handling equipment as needed  - Ensure adequate protection for wounds/incisions during mobilization  - Obtain PT/OT consults as needed  - Advance activity as appropriate  - Communicate ordered activity level and limitations with patient/family  Outcome: Progressing     Problem: Impaired Functional Mobility  Goal: Achieve highest/safest level of mobility/gait  Description: Interventions:  - Assess patient's functional ability and stability  - Promote increasing activity/tolerance for  mobility and gait  - Educate and engage patient/family in tolerated activity level and precautions  Outcome: Progressing     Problem: Impaired Activities of Daily Living  Goal: Achieve highest/safest level of independence in self care  Description: Interventions:  - Assess ability and encourage patient to participate in ADLs to maximize function  - Promote sitting position while performing ADLs such as feeding, grooming, and bathing  - Educate and encourage patient/family in tolerated functional activity level and precautions during self-care  Outcome: Progressing

## 2024-05-22 NOTE — CM/SW NOTE
05/22/24 1300   Discharge disposition   Expected discharge disposition Home or Self   Post Acute Care Provider Residential   Home services after discharge Other   Discharge transportation Private car     Pt received MDO for discharge. Pt will be going home with [facility] and has caregiver through the state.     SW/CM to remain available for support and/or discharge planning.     Devorah Hoyos MSW, LSW   x [phone]

## 2024-05-22 NOTE — PROGRESS NOTES
[facility]  part of [facility]     Progress Note    Lizbeth Matute Patient Status:  Inpatient    1945 MRN [id number]   Location [facility] 5SW/SE Attending Ambar Sears MD   Hosp Day # 2 PCP Kimmy Talbot MD       Subjective:   Patient seen and examined.  Admits to some left-sided rib cage discomfort    Objective:   Blood pressure 134/70, pulse 61, temperature 98.5 °F (36.9 °C), temperature source Oral, resp. rate 18, height 5' (1.524 m), weight 130 lb (59 kg), SpO2 95%, not currently breastfeeding.  Intake/Output:   Last 3 shifts: I/O last 3 completed shifts:  In: 1174 [P.O.:1144; I.V.:30]  Out: 2500 [Urine:2500]   This shift: I/O this shift:  In: 240 [P.O.:240]  Out: 400 [Urine:400]     Vent Settings:      Hemodynamic parameters (last 24 hours):      Scheduled Meds:   Current Facility-Administered Medications   Medication Dose Route Frequency    magnesium oxide (Mag-Ox) tab 400 mg  400 mg Oral Once    HYDROcodone-acetaminophen (Norco) 5-325 MG per tab 1 tablet  1 tablet Oral Q8H    lidocaine-menthol 4-1 % patch 1 patch  1 patch Transdermal Daily    donepezil (Aricept) tab 10 mg  10 mg Oral QPM    famotidine (Pepcid) tab 20 mg  20 mg Oral Daily    lidocaine-menthol 4-1 % patch 1 patch  1 patch Transdermal Daily PRN    lisinopril (Prinivil; Zestril) tab 20 mg  20 mg Oral Daily    pregabalin (Lyrica) cap 100 mg  100 mg Oral BID    sertraline (Zoloft) tab 100 mg  100 mg Oral Nightly    heparin (Porcine) 5000 UNIT/ML injection 5,000 Units  5,000 Units Subcutaneous Q8H LIZ    acetaminophen (Tylenol Extra Strength) tab 500 mg  500 mg Oral Q4H PRN    acetaminophen (Tylenol) tab 650 mg  650 mg Oral Q4H PRN    Or    HYDROcodone-acetaminophen (Norco) 5-325 MG per tab 1 tablet  1 tablet Oral Q4H PRN    Or    HYDROcodone-acetaminophen (Norco) 5-325 MG per tab 2 tablet  2 tablet Oral Q4H PRN    morphINE PF 2 MG/ML injection 1 mg  1 mg Intravenous Q2H PRN    Or    morphINE  PF 2 MG/ML injection 2 mg  2 mg Intravenous Q2H PRN    Or    morphINE PF 4 MG/ML injection 4 mg  4 mg Intravenous Q2H PRN    polyethylene glycol (PEG 3350) (Miralax) 17 g oral packet 17 g  17 g Oral Daily PRN    sennosides (Senokot) tab 17.2 mg  17.2 mg Oral Nightly PRN    bisacodyl (Dulcolax) 10 MG rectal suppository 10 mg  10 mg Rectal Daily PRN    fleet enema (Fleet) 7-19 GM/118ML rectal enema 133 mL  1 enema Rectal Once PRN    ondansetron (Zofran) 4 MG/2ML injection 4 mg  4 mg Intravenous Q6H PRN       Continuous Infusions:     Physical Exam  Constitutional: no acute distress  Eyes: PERRL  ENT: nares pateint  Neck: supple, no JVD  Cardio: RRR, S1 S2  Respiratory: clear to auscultation bilaterally, no wheezing, rales, rhonchi, crackles  GI: abdomen soft, non tender, active bowel sounds, no organomegaly  Extremities: no clubbing, cyanosis, edema  Neurologic: no gross motor deficits  Skin: warm, dry      Results:     Lab Results   Component Value Date    WBC 4.3 05/22/2024    HGB 11.2 05/22/2024    HCT 34.7 05/22/2024    .0 05/22/2024    CREATSERUM 0.81 05/22/2024    BUN 17 05/22/2024     05/22/2024    K 4.3 05/22/2024     05/22/2024    CO2 26.0 05/22/2024    GLU 90 05/22/2024    CA 9.8 05/22/2024    MG 1.7 05/22/2024       XR CHEST AP PORTABLE  (CPT=71045)    Result Date: 5/22/2024  CONCLUSION:  1. Mild interstitial opacities are demonstrated and may reflect atelectasis and/or early pulmonary interstitial edema.  2. Borderline cardiomegaly without significant pulmonary vascular congestion.    Dictated by (CST): Keegan Avina MD on 5/22/2024 at 9:04 AM     Finalized by (CST): Keegan Avina MD on 5/22/2024 at 9:06 AM          CT ABDOMEN+PELVIS(CONTRAST ONLY)(CPT=74177)    Result Date: 5/20/2024  CONCLUSION:   No acute traumatic injury within the abdomen or pelvis.  Left tenth and 11th rib fractures.  Ill-defined area of ground-glass within the left lower lobe suggestive of pulmonary contusion.   Diverticulosis without diverticulitis.  Multiple other incidental findings as described in the body of the report.   Dictated by (CST): Clark Khan MD on 5/20/2024 at 4:03 PM     Finalized by (CST): Clark Khan MD on 5/20/2024 at 4:13 PM          XR RIBS WITH CHEST (3 VIEWS), LEFT  (CPT=71101)    Result Date: 5/20/2024  CONCLUSION:   Acute nondisplaced left posterolateral ninth and 10th rib fractures.  Acute angulated left lateral 11th rib fracture.  Acute nondisplaced left posterior 6th and 7th rib fractures.  Remote left lateral 6th rib fracture.  No pneumothorax    Dictated by (CST): Clark Khan MD on 5/20/2024 at 3:51 PM     Finalized by (CST): Clark Khan MD on 5/20/2024 at 3:53 PM                 Assessment   1.  Left-sided rib fractures  2.  Status post fall  3.  Likely pulmonary contusion  4.  Hypertension  5.  Dementia     Plan   -Patient presents after mechanical fall.  Chest ribs with evidence of left-sided sixth through 11th rib fractures seen.  CT abdomen pelvis with left lower lobe findings suggestive of pulmonary contusion.  Imaging with no evidence of significant pleural effusion or pneumothorax seen.  -Chest x-ray on 5/22/2024 with likely atelectatis and mild edema seen.  -No significant hypoxia.  Monitor respiratory status closely.  -Incentive spirometry  -Pain control  -PT/OT  -Activity as tolerated  -DVT prophylaxis: Kathleen Soto,   Pulmonary Critical Care Medicine  [facility]

## 2024-05-22 NOTE — PLAN OF CARE
Problem: Patient Centered Care  Goal: Patient preferences are identified and integrated in the patient's plan of care  Description: Interventions:  - What would you like us to know as we care for you? From home alone. Has a caregiver Monday through Friday for 4 hours.   - Provide timely, complete, and accurate information to patient/family  - Incorporate patient and family knowledge, values, beliefs, and cultural backgrounds into the planning and delivery of care  - Encourage patient/family to participate in care and decision-making at the level they choose  - Honor patient and family perspectives and choices  Outcome: Progressing     Problem: Patient/Family Goals  Goal: Patient/Family Long Term Goal  Description: Patient's Long Term Goal: Discharge from the hospital    Interventions:  - Monitor vital signs  - Monitor appropriate labs  - Pain management  - Administer medications per order  - Follow MD orders  - Diagnostics per order  - Update / inform patient and family on plan of care  - Discharge planning  - See additional Care Plan goals for specific interventions  Outcome: Progressing  Goal: Patient/Family Short Term Goal  Description: Patient's Short Term Goal: Improve rib pain     Interventions:   - Monitor vital signs  - Monitor appropriate labs  - Pain management  - Administer medications per order  - Follow MD orders  - Diagnostics per order  - Update / inform patient and family on plan of care  - See additional Care Plan goals for specific interventions  Outcome: Progressing     Problem: PAIN - ADULT  Goal: Verbalizes/displays adequate comfort level or patient's stated pain goal  Description: INTERVENTIONS:  - Encourage pt to monitor pain and request assistance  - Assess pain using appropriate pain scale  - Administer analgesics based on type and severity of pain and evaluate response  - Implement non-pharmacological measures as appropriate and evaluate response  - Consider cultural and social influences  on pain and pain management  - Manage/alleviate anxiety  - Utilize distraction and/or relaxation techniques  - Monitor for opioid side effects  - Notify MD/LIP if interventions unsuccessful or patient reports new pain  - Anticipate increased pain with activity and pre-medicate as appropriate  Outcome: Progressing     Problem: SAFETY ADULT - FALL  Goal: Free from fall injury  Description: INTERVENTIONS:  - Assess pt frequently for physical needs  - Identify cognitive and physical deficits and behaviors that affect risk of falls.  - [place] fall precautions as indicated by assessment.  - Educate pt/family on patient safety including physical limitations  - Instruct pt to call for assistance with activity based on assessment  - Modify environment to reduce risk of injury  - Provide assistive devices as appropriate  - Consider OT/PT consult to assist with strengthening/mobility  - Encourage toileting schedule  Outcome: Progressing     Problem: DISCHARGE PLANNING  Goal: Discharge to home or other facility with appropriate resources  Description: INTERVENTIONS:  - Identify barriers to discharge w/pt and caregiver  - Include patient/family/discharge partner in discharge planning  - Arrange for needed discharge resources and transportation as appropriate  - Identify discharge learning needs (meds, wound care, etc)  - Arrange for interpreters to assist at discharge as needed  - Consider post-discharge preferences of patient/family/discharge partner  - Complete POLST form as appropriate  - Assess patient's ability to be responsible for managing their own health  - Refer to Case Management Department for coordinating discharge planning if the patient needs post-hospital services based on physician/LIP order or complex needs related to functional status, cognitive ability or social support system  Outcome: Progressing     Problem: Altered Communication/Language Barrier  Goal: Patient/Family is able to understand and  participate in their care  Description: Interventions:  - Assess communication ability and preferred communication style  - Implement communication aides and strategies  - Use visual cues when possible  - Listen attentively, be patient, do not interrupt  - Minimize distractions  - Allow time for understanding and response  - Establish method for patient to ask for assistance (call light)  - Provide an  as needed  - Communicate barriers and strategies to overcome with those who interact with patient  Outcome: Progressing     Problem: SKIN/TISSUE INTEGRITY - ADULT  Goal: Skin integrity remains intact  Description: INTERVENTIONS  - Assess and document risk factors for pressure ulcer development  - Assess and document skin integrity  - Monitor for areas of redness and/or skin breakdown  - Initiate interventions, skin care algorithm/standards of care as needed  Outcome: Progressing     Problem: MUSCULOSKELETAL - ADULT  Goal: Return mobility to safest level of function  Description: INTERVENTIONS:  - Assess patient stability and activity tolerance for standing, transferring and ambulating w/ or w/o assistive devices  - Assist with transfers and ambulation using safe patient handling equipment as needed  - Ensure adequate protection for wounds/incisions during mobilization  - Obtain PT/OT consults as needed  - Advance activity as appropriate  - Communicate ordered activity level and limitations with patient/family  Outcome: Progressing     Problem: Impaired Functional Mobility  Goal: Achieve highest/safest level of mobility/gait  Description: Interventions:  - Assess patient's functional ability and stability  - Promote increasing activity/tolerance for mobility and gait  - Educate and engage patient/family in tolerated activity level and precautions  - Recommend use of  RW for transfers and ambulation  - Recommend patient transfer to bedside chair toward strongest side  - When transferring patient, block weaker  knee for safety  - Use towel roll for neutral alignment of cervical spine  Outcome: Progressing     Problem: Impaired Activities of Daily Living  Goal: Achieve highest/safest level of independence in self care  Description: Interventions:  - Assess ability and encourage patient to participate in ADLs to maximize function  - Promote sitting position while performing ADLs such as feeding, grooming, and bathing  - Educate and encourage patient/family in tolerated functional activity level and precautions during self-care  Outcome: Progressing      Monitoring vital signs- stable at this time. Pain medication provided- see MAR. No acute changes noted at this moment. Safety and fall precautions maintained- bed alarm on, bed locked in lowest position, call light within reach. Frequent rounding by nursing staff.

## 2024-05-22 NOTE — DISCHARGE INSTRUCTIONS
Sometimes managing your health at home requires assistance.  The [facility] team has recognized your preference to use [facility].  They can be reached by phone at [phone].  The fax number for your reference is [phone].  A representative from the home health agency will contact you or your family to schedule your first visit.       New number for [facility] [phone]. Please call the [phone] number [phone] if this number gives you any trouble. This is a a newly changed main number for [facility].

## 2024-05-22 NOTE — DISCHARGE SUMMARY
[facility]  part of [facility]    Discharge Summary    Lizbeth Matute Patient Status:  Inpatient    1945 MRN [id number]   Location [facility] 5SW/SE Attending Ambar Sears MD   Hosp Day # 2 PCP Kimmy Talbot MD     Date of Admission: 2024      Date of Discharge: 24      Admitting Diagnosis: Closed fracture of multiple ribs of left side, initial encounter [S22.42XA]  Contusion of left lung, initial encounter [S27.321A]    Hospital Discharge Diagnoses:  Left rib fracture    Lace+ Score: 70  59-90 High Risk  29-58 Medium Risk  0-28   Low Risk.    TCM Follow-Up Recommendation:  LACE > 58: High Risk of readmission after discharge from the hospital.          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)    Right hip pain    Facet syndrome, lumbar    DDD (degenerative disc disease), lumbosacral    Primary hypertension    Hyperlipidemia    Mechanical low back pain    Chronic pain of both shoulders    Tendinopathy of rotator cuff    Biomechanical lesion    Myalgia    Anemia    Goiter    Hyperparathyroidism (HCC)    Moderate episode of recurrent major depressive disorder (HCC)    Neutropenic colitis (HCC)    Senile dementia, uncomplicated (HCC)    Thrombocytopenia (HCC)    Hearing aid consultation    Visual impairment    Age-related osteoporosis without current pathological fracture    Difficulty in walking, not elsewhere classified    Rheumatoid arthritis without rheumatoid factor, unspecified site (HCC)    Acute otitis externa of  right ear    Primary osteoarthritis of right knee    Heartburn    Dry eyes    Eye globe prosthesis    Itch of eye    Squamous blepharitis of upper and lower eyelids of both eyes    Meibomian gland dysfunction (MGD) of both eyes    Age-related nuclear cataract of right eye    Closed fracture of multiple ribs of left side, initial encounter    Contusion of left lung, initial encounter         Physical Exam:     Gen: No acute distress  Pulm: Lungs clear, normal respiratory effort  CV: Heart with regular rate and rhythm  Abd: Abdomen soft, nontender, nondistended, bowel sounds present  Neuro: No acute focal deficits  MSK: moves extremities  Skin: Warm and dry  Psych: Normal affect  Ext: no c/c/e      History of Present Illness: Per admit MD    This is a 78 year oldfemale who presented after fall at home.  Patient reported that she was climbing on a stepladder when she fell.  Patient fell backwards and believes she hit the table with her back and then landed on the floor.  Patient was adamant she did not hit her head or lose consciousness.  After the fall she began having left upper back pain.  Patient stated pain was worse with breathing.  Pain was also worse with any movement.  Patient presented to the ED for further evaluation.  Patient stated due to the difficulty with taking deep breaths she did note some mild shortness of breath.  Denied cough or fevers.  Patient denied current chest pain, abdominal pain, nausea vomiting     Hospital Course:     Closed fracture of multiple ribs of left side, initial encounter  Pulmonary contusion  -Secondary to fall from stepstool.  -Imaging consistent with acute nondisplaced left 6, 7, 9, 10 and 11 rib fractures.  -CT imaging also noted associated left lower lobe pulmonary contusion  -Starting pain control and lidocaine patch, tolerating oral meds for pain  -Encouraging incentive spirometry  -PT/OT     HTN  -Elevations noted  -Restart home regimen  - Monitor and adjust as needed      History of dementia  -Restart home donepezil    Discharge Condition: Stable    Discharge Medications:      Discharge Medications        START taking these medications        Instructions Prescription details   HYDROcodone-acetaminophen 5-325 MG Tabs  Commonly known as: Norco      Take 1 tablet by mouth every 4 (four) hours as needed.   Quantity: 30 tablet  Refills: 0     Polyethylene Glycol 3350 17 g Pack  Commonly known as: MIRALAX      Take 17 g by mouth daily as needed (If no bowel movement in last 24 hours).   Quantity: 10 each  Refills: 0            CONTINUE taking these medications        Instructions Prescription details   alendronate 70 MG Tabs  Commonly known as: Fosamax      Take 1 tablet (70 mg total) by mouth every 7 days.   Quantity: 12 tablet  Refills: 3     ALLERGY 24-HR OR      Take 1 tablet by mouth daily.   Refills: 0     CALCIUM-VITAMIN D OR      Take 1 tablet by mouth daily.   Refills: 0     cinacalcet 30 MG Tabs  Commonly known as: Sensipar      Take 0.5 tablets (15 mg total) by mouth daily.   Quantity: 45 tablet  Refills: 3     diclofenac 0.1 % Soln  Commonly known as: Voltaren      Place 1 drop into the left eye daily as needed.   Refills: 0     diclofenac 1 % Gel  Commonly known as: Voltaren      Apply 2 g topically 4 (four) times daily.   Quantity: 150 g  Refills: 2     diclofenac 75 MG Tbec  Commonly known as: Voltaren      Take 1 tablet (75 mg total) by mouth 2 (two) times daily.   Refills: 0     donepezil 10 MG Tabs  Commonly known as: Aricept      Take 1 tablet (10 mg total) by mouth every evening.   Quantity: 90 tablet  Refills: 3     famotidine 20 MG Tabs  Commonly known as: Pepcid      Take 1 tablet (20 mg total) by mouth 2 (two) times daily.   Quantity: 90 tablet  Refills: 3     lidocaine 5 % Ptch  Commonly known as: Lidoderm      Place 1 patch onto the skin daily for 7 days.   Stop taking on: May 29, 2024  Quantity: 7 patch  Refills: 0     lisinopril 20 MG Tabs  Commonly known as:  Prinivil; Zestril      Take 1 tablet (20 mg total) by mouth daily.   Quantity: 90 tablet  Refills: 3     Multiple Vitamins/Womens Tabs      Take 1 tablet by mouth daily.   Refills: 0     NIFEdipine ER 60 MG Tb24  Commonly known as: ADALAT CC      Take 1 tablet (60 mg total) by mouth daily.   Refills: 0     pregabalin 100 MG Caps  Commonly known as: Lyrica      Take 1 capsule (100 mg total) by mouth 2 (two) times daily.   Quantity: 60 capsule  Refills: 11     QC Tumeric Complex 500 MG Caps  Generic drug: Turmeric      Take 1 tablet by mouth 2 (two) times daily.   Refills: 0     sertraline 100 MG Tabs  Commonly known as: Zoloft      Take 1 tablet (100 mg total) by mouth nightly.   Quantity: 90 tablet  Refills: 3     Vitamin D3 25 MCG (1000 UT) Caps      Take 1 tablet by mouth daily.   Refills: 0     ZINC OR      Take 1 tablet by mouth daily.   Refills: 0               Where to Get Your Medications        These medications were sent to [id number] [address] AT [facility] [address], [phone], [phone] [address], [facility] [address]      Phone: [phone]   HYDROcodone-acetaminophen 5-325 MG Tabs  lidocaine 5 % Ptch  Polyethylene Glycol 3350 17 g Pack             Ambar Sears MD  5/22/2024  1:01 PM    Greater than 30 minutes spent on preparation and coordination of this discharge

## 2024-05-23 NOTE — TELEPHONE ENCOUNTER
Peri from [facility] called stating that she would like to confirm that  is the patients PCP. She states they opened the patients case for home care today (05/23/2024). She states patient will have physical therapy and occupational therapy and nursing. She would like to confirm that  will sign the patients orders. Patient was seen in ER after falling off a step later at home she has fracture to her 9th,10 and 11th ribs. Their goal is to work on getting the patient stronger. She also states that the patient was informed she needs to schedule a hospital follow-up post fall.

## 2024-05-23 NOTE — PROGRESS NOTES
NC placed call to patient for TCM, it is Maddy's (pt's daughter and POA) [facility] to [phone] with a condition update.    Discharge Dx:   Closed fracture of multiple ribs of left side, initial encounter  Contusion of left lung, initial encounter

## 2024-05-24 NOTE — PROGRESS NOTES
Initial Post Discharge Follow Up   Discharge Date: 5/22/24  Contact Date: 5/23/2024    Consent Verification:  Assessment Completed With: Other: patient's daughter Maddy  Permission received per patient?  written  HIPAA Verified?  Yes    Discharge Dx:   Left rib fracture     General:   How have you been since your discharge from the hospital?   Maddy states she feels the patient is doing ok. Maddy states the patient continues to have L sided rib pain and difficulty sleeping because of this. Maddy does state however the patient did mention to her the pain is not as bad as it was before. She reports she is taking the Norco as needed.  Maddy states the patient is falling 2-3 times a month and this is her biggest concern. She reports last eye exam was in January. Maddy confirms the Home Health RN did come yesterday. She reports physical therapy is coming today.  Maddy denies patient having chest pain, shortness of breath, dizziness, lightheadedness, or AMS. She reports she is using the IS daily. NCM suggested to Maddy to have patient apply ice to injured area for 15 minutes every 1 to 2 hours as needed to help with the pain. NCM advised to continue to use the IS 10 times per hour while awake as directed to prevent pulmonary complications and advised fall precautions.    RN reviewed Fall Prevention Guidelines:   Obtain annual eye exams   Get regular exercise as this helps improve strength and balance   Wear appropriate shoes (non-slip)  Secure or remove loose rugs within environment  Add non slip bath safety stickers to shower and tuf floors   Add railings to all stairs and ensure adequate lighting  Add grab bars in bathrooms  Place a bench in areas where you dress yourself     Do you have any pain since discharge?  Yes  Where: left sided rib pain    Rating on pain scale 1-10, 10 being the worst pain you have ever experienced, what is current pain: Maddy states it is difficult to rate this as  she is not currently with the patient   Alleviating factors: resting and taking the Norco.   Aggravating factors:  nothing   Is the pain manageable at home? Yes  How well was your pain managed while in the hospital?   On a scale of 1-5   1- Very Poor and 5- Very well   Very Well  When you were leaving the hospital were your discharge instructions reviewed with you? Yes  How well were your discharge instructions explained to you?   On a scale of 1-5   1- Very Poor and 5- Very well   Very Well  Do you have any questions about your discharge instructions?  No  Before leaving the hospital was your diagnoses explained to you? Yes  Do you have any questions about your diagnoses? No  Are you able to perform normal daily activities of living as you have prior to your hospital stay (dressing, bathing, ambulating to the bathroom, etc)? yes  (NCM) Was patient given a different diet per AVS? no      Medications:   Current Outpatient Medications   Medication Sig Dispense Refill    lidocaine 5 % External Patch Place 1 patch onto the skin daily for 7 days. 7 patch 0    HYDROcodone-acetaminophen 5-325 MG Oral Tab Take 1 tablet by mouth every 4 (four) hours as needed. 30 tablet 0    polyethylene glycol, PEG 3350, 17 g Oral Powd Pack Take 17 g by mouth daily as needed (If no bowel movement in last 24 hours). 10 each 0    NIFEdipine ER 60 MG Oral Tablet 24 Hr Take 1 tablet (60 mg total) by mouth daily.      CALCIUM-VITAMIN D OR Take 1 tablet by mouth daily.      diclofenac 75 MG Oral Tab EC Take 1 tablet (75 mg total) by mouth 2 (two) times daily.      Multiple Vitamins-Minerals (MULTIPLE VITAMINS/WOMENS) Oral Tab Take 1 tablet by mouth daily.      Cholecalciferol (VITAMIN D3) 25 MCG (1000 UT) Oral Cap Take 1 tablet by mouth daily.      diclofenac 0.1 % Ophthalmic Solution Place 1 drop into the left eye daily as needed.      Turmeric (QC TUMERIC COMPLEX) 500 MG Oral Cap Take 1 tablet by mouth 2 (two) times daily.      Fexofenadine HCl  (ALLERGY 24-HR OR) Take 1 tablet by mouth daily.      pregabalin 100 MG Oral Cap Take 1 capsule (100 mg total) by mouth 2 (two) times daily. 60 capsule 11    diclofenac 1 % External Gel Apply 2 g topically 4 (four) times daily. (Patient taking differently: Apply 2 g topically 4 (four) times daily. To right knee) 150 g 2    alendronate 70 MG Oral Tab Take 1 tablet (70 mg total) by mouth every 7 days. (Patient taking differently: Take 1 tablet (70 mg total) by mouth every 7 days. On fridays) 12 tablet 3    cinacalcet 30 MG Oral Tab Take 0.5 tablets (15 mg total) by mouth daily. 45 tablet 3    donepezil 10 MG Oral Tab Take 1 tablet (10 mg total) by mouth every evening. 90 tablet 3    famotidine 20 MG Oral Tab Take 1 tablet (20 mg total) by mouth 2 (two) times daily. 90 tablet 3    lisinopril 20 MG Oral Tab Take 1 tablet (20 mg total) by mouth daily. 90 tablet 3    sertraline 100 MG Oral Tab Take 1 tablet (100 mg total) by mouth nightly. 90 tablet 3    ZINC OR Take 1 tablet by mouth daily.       Were there any changes to your current medication(s) noted on the AVS? Yes  NCM reviewed the following changes with the patient's daughter:   SERAFIN harper:  HYDROcodone-acetaminophen (Norco)  Polyethylene Glycol 3350 (MIRALAX)  If so, were these medication changes discussed with you prior to leaving the hospital? Yes  If a new medication was prescribed:    Was the new medication's purpose & side effects reviewed? Yes  Do you have any questions about your new medication? No  Did you  your discharge medications when you left the hospital? Yes  Let's go over your medications together to make sure we are not missing anything. Medications Reviewed  Are there any reasons that keep you from taking your medication as prescribed? No  Are you having any concerns with constipation? No      Discharge medications reviewed/discussed/and reconciled against outpatient medications with patient.  Any changes or updates to medications  sent to PCP.  Patient Acknowledged     Referrals/orders at D/C:  Referrals/orders placed at D/C? yes  What services:   Home health   (If HH was ordered) Has HH been set up?  Yes    If Yes: With Whom:   [facility]. They can be reached by phone at [phone]. The fax number for your  reference is [phone]. A representative from the home health agency will contact you or your family to schedule  your first visit.  New number for [facility] [phone]. Please call the [phone] number above if this number gives you any trouble. This is a a  newly changed main number for [facility].      DME ordered at D/C? Yes  What? IS   Have you received your (DME)? yes    Discharge orders, AVS reviewed and discussed with patient. Any changes or updates to orders sent to PCP.  Patient Acknowledged      SDOH:   Transportation Needs: No Transportation Needs (5/20/2024)    Transportation Needs     Lack of Transportation: No     Car Seat: Not on file     Financial Resource Strain: Low Risk  (5/24/2024)    Financial Resource Strain     Difficulty of Paying Living Expenses: Not very hard     Med Affordability: Not on file     Follow up appointments:      TCC  Was TCC ordered: No    PCP (If no TCC appointment)  Does patient already have a PCP appointment scheduled? No  NCM Scheduled PCP office TCM appointment with patient   If no appointment scheduled: Explain    Future Appointments   Date Time Provider Department Center   5/30/2024  2:00 PM Kimmy Talbot MD [facility]         Specialist    Does the patient have any other follow up appointment(s) needing to be scheduled? No    Is there any reason as to why you cannot make your appointment(s)?  No     Needs post D/C:   Now that you are home, are there any needs or concerns you need addressed before your next visit with your PCP?  (DME, meds, questions, etc.): No    Interventions by NCM:   NCM suggested to Maddy to have patient apply ice to injured area for 15 minutes every 1  to 2 hours as needed to help with the pain. NCM advised to continue to use the IS 10 times per hour while awake as directed to prevent pulmonary complications and advised fall precautions. TCM/HFU appointment scheduled for 05/30/2024. All d/c instructions reviewed with pt.  Reviewed when to call MD vs when to go to ER/call 911.  Educated pt on the importance of taking all meds as prescribed as well as close f/u with PCP/specialists.  Pt verbalized understanding and will contact office with any further questions or concerns.       Overall Rating:   How would you rate the care you received while in the hospital? excellent    CCM referral placed:    No

## 2024-05-24 NOTE — TELEPHONE ENCOUNTER
RACHAEL Duque    Spoke to Vicky, Occupational Therapist from [facility] (name and  of patient verified). She is calling to report that the Occupational Therapy Assessment has been completed. Will treat patient.

## 2024-05-24 NOTE — TELEPHONE ENCOUNTER
Oxycodone sent to pharmacy.    1. Closed fracture of multiple ribs, unspecified laterality, initial encounter  [place]   - oxyCODONE 5 MG Oral Tab; Take 1 tablet (5 mg total) by mouth 2 (two) times daily as needed for Pain (For severe pain only, can take Tylenol for breakthrough pain).  Dispense: 30 tablet; Refill: 0

## 2024-05-24 NOTE — TELEPHONE ENCOUNTER
Dr. Talbot, occupational health provider called to report patient is in 8/10 pain, Norco 5-325 mg every 4 hours is not effective in managing pain for left rib fracture. Please advise on pain medication to pharmacy or other recommendations. Thank you.    Spoke to Vicky, Occupational Therapist from [facility] (name and  of patient verified). She is calling to report since she arrived at 10:30 am, patient reports pain is 8/10. She was discharged 24 with a left rib fracture.  Last took Norco at 8 am    Pain Medication Prescribed at Discharge is not managing pain:  Medication Quantity Refills Start End   HYDROcodone-acetaminophen 5-325 MG Oral Tab 30 tablet 0 2024 --   Sig:   Take 1 tablet by mouth every 4 (four) hours as needed.     Route:   Oral

## 2024-05-24 NOTE — TELEPHONE ENCOUNTER
Spoke to daughter Maddy (per MARTIN) and informed her of additional prescription for oxycodone - only for sever pain, up to twice daily. Daughter verbalized understanding.    RN encouraged daughter to bring patient in to immediate care or Emergency room over the Holiday weekend if the new medication is not helping, or if pain is worsening.   Daughter verbalized agreement with this plan.

## 2024-05-30 NOTE — PROGRESS NOTES
Subjective:   Lizbeth Matute is a 78 year old female who presents for hospital follow up.   She was discharged from [facility] to Home Health Care Services  Admission Date: 5/20/24   Discharge Date: 5/22/24  Hospital Discharge Diagnosis: Rib fracture    Interactive contact within 2 business days post discharge first initiated on Date: 5/23/2024    During the visit, the following was completed:  Obtained and reviewed discharge summary, continuity of care documents, and Hospitalist notes  Reviewed Labs (CBC, CMP)    HPI:     Norco was helping but the home health therapist was concerned about significant pain so we sent oxycodone with oxycodone makes her sleepy and confused so this was discontinued.    HPI copied from hospitalist discharge summary:    This is a 78 year old female who presented after fall at home.  Patient reported that she was climbing on a stepladder when she fell.  Patient fell backwards and believes she hit the table with her back and then landed on the floor.  Patient was adamant she did not hit her head or lose consciousness.  After the fall she began having left upper back pain.  Patient stated pain was worse with breathing.  Pain was also worse with any movement.  Patient presented to the ED for further evaluation.  Patient stated due to the difficulty with taking deep breaths she did note some mild shortness of breath.  Denied cough or fevers.  Patient denied current chest pain, abdominal pain, nausea vomiting      Hospital Course:      Closed fracture of multiple ribs of left side, initial encounter  Pulmonary contusion  -Secondary to fall from stepstool.  -Imaging consistent with acute nondisplaced left 6, 7, 9, 10 and 11 rib fractures.  -CT imaging also noted associated left lower lobe pulmonary contusion  -Starting pain control and lidocaine patch, tolerating oral meds for pain  -Encouraging incentive spirometry  -PT/OT     HTN  -Elevations noted  -Restart home regimen  - Monitor  and adjust as needed     History of dementia  -Restart home donepezil      History/Other:   Current Medications:  Medication Reconciliation:  I am aware of an inpatient discharge within the last 30 days.  The discharge medication list has been reconciled with the patient's current medication list and reviewed by me.  See medication list for additions of new medication, and changes to current doses of medications and discontinued medications.  Outpatient Medications Marked as Taking for the 5/30/24 encounter (Office Visit) with Kimmy Talbot MD   Medication Sig    HYDROcodone-acetaminophen 5-325 MG Oral Tab Take 1 tablet by mouth every 4 (four) hours as needed.    polyethylene glycol, PEG 3350, 17 g Oral Powd Pack Take 17 g by mouth daily as needed (If no bowel movement in last 24 hours).    NIFEdipine ER 60 MG Oral Tablet 24 Hr Take 1 tablet (60 mg total) by mouth daily.    CALCIUM-VITAMIN D OR Take 1 tablet by mouth daily.    diclofenac 75 MG Oral Tab EC Take 1 tablet (75 mg total) by mouth 2 (two) times daily.    Multiple Vitamins-Minerals (MULTIPLE VITAMINS/WOMENS) Oral Tab Take 1 tablet by mouth daily.    Cholecalciferol (VITAMIN D3) 25 MCG (1000 UT) Oral Cap Take 1 tablet by mouth daily.    diclofenac 0.1 % Ophthalmic Solution Place 1 drop into the left eye daily as needed.    Turmeric (QC TUMERIC COMPLEX) 500 MG Oral Cap Take 1 tablet by mouth 2 (two) times daily.    Fexofenadine HCl (ALLERGY 24-HR OR) Take 1 tablet by mouth daily.    pregabalin 100 MG Oral Cap Take 1 capsule (100 mg total) by mouth 2 (two) times daily.    diclofenac 1 % External Gel Apply 2 g topically 4 (four) times daily. (Patient taking differently: Apply 2 g topically 4 (four) times daily. To right knee)    alendronate 70 MG Oral Tab Take 1 tablet (70 mg total) by mouth every 7 days. (Patient taking differently: Take 1 tablet (70 mg total) by mouth every 7 days. On fridays)    cinacalcet 30 MG Oral Tab Take 0.5 tablets (15 mg total)  by mouth daily.    donepezil 10 MG Oral Tab Take 1 tablet (10 mg total) by mouth every evening.    famotidine 20 MG Oral Tab Take 1 tablet (20 mg total) by mouth 2 (two) times daily.    lisinopril 20 MG Oral Tab Take 1 tablet (20 mg total) by mouth daily.    sertraline 100 MG Oral Tab Take 1 tablet (100 mg total) by mouth nightly.    ZINC OR Take 1 tablet by mouth daily.       Review of Systems:  Negative, except per HPI.       Objective:   No LMP recorded. Patient is postmenopausal.  Estimated body mass index is 25.19 kg/m² as calculated from the following:    Height as of 5/20/24: 5' (1.524 m).    Weight as of this encounter: 129 lb (58.5 kg).   /61   Pulse 65   Wt 129 lb (58.5 kg)   BMI 25.19 kg/m²    GENERAL: well developed, well nourished, in no apparent distress  SKIN: no rashes, no suspicious lesions  HEENT: atraumatic, normocephalic, ears and throat are clear  EYES: PERRLA, EOMI, conjunctiva are clear  NECK: supple, no adenopathy, no bruits  CHEST: no chest tenderness  LUNGS: clear to auscultation  CARDIO: RRR without murmur    Assessment & Plan:   1. Hospital discharge follow-up (Primary)  -Hospital discharge summary reviewed and appreciated.    2. Closed fracture of multiple ribs of left side, initial encounter  -     HYDROcodone-Acetaminophen; Take 1 tablet by mouth every 4 (four) hours as needed.  Dispense: 60 tablet; Refill: 0        No follow-ups on file.

## 2024-06-04 NOTE — TELEPHONE ENCOUNTER
Home health certification/recertification received, appropriate areas were signed and dated.    Clinical staff to fax the certification and plan of care to the home health provider today.

## 2024-06-14 NOTE — TELEPHONE ENCOUNTER
Please review.  Medication was discontinued on 5/20/24 during hospital stay.     Patient is asking for refill. Please advise.   Requested Prescriptions   Pending Prescriptions Disp Refills    diclofenac 75 MG Oral Tab EC  0     Sig: Take 1 tablet (75 mg total) by mouth 2 (two) times daily.       Non-Narcotic Pain Medication Protocol Passed - 6/12/2024  3:44 PM        Passed - In person appointment or virtual visit in the past 6 mos or appointment in next 3 mos     Recent Outpatient Visits              2 weeks ago Hospital discharge follow-up    [facility], Kimmy Gordon MD    Office Visit    4 weeks ago Post-traumatic osteoarthritis of right knee    [facility], Darwin Kay MD    Office Visit    1 month ago [facility] pain, unspecified type    [facility]    [facility]    4 months ago [facility]igue, unspecified type    [facility]    [facility]    [facility]    [facility] [place] [facility], Alex, MD    Office Visit                           Recent Outpatient Visits              2 weeks ago Hospital discharge follow-up    [facility], Kimmy Gordon MD    Office Visit    4 weeks ago [facility]    [facility]    1 month ago Chest pain, unspecified type    [facility]    [facility]    4 months ago Fatigue, unspecified type    [facility]    [facility]    [facility]    [facility] [place] [facility], Alex, MD    Office Visit

## 2024-06-14 NOTE — TELEPHONE ENCOUNTER
Refill passed per [facility] protocol.    Requested Prescriptions   Pending Prescriptions Disp Refills    FAMOTIDINE 20 MG Oral Tab [Pharmacy Med Name: FAMOTIDINE 20MG TABLETS] 90 tablet 3     Sig: TAKE 1 TABLET(20 MG) BY MOUTH TWICE DAILY       Gastrointestional Medication Protocol Passed - 6/11/2024 11:57 AM        Passed - In person appointment or virtual visit in the past 12 mos or appointment in next 3 mos     Recent Outpatient Visits              2 weeks ago Hospital discharge follow-up    [facility], Kimmy Gordon MD    Office Visit    4 weeks ago Post-traumatic osteoarthritis of right knee    [facility]    Office Visit    1 month ago Chest pain, unspecified type    [facility], Kimmy Gordon MD    Office Visit    4 months ago [facility]igue, unspecified type    [facility]    [facility]    [facility]    [facility] [place] Behar, Alex, MD    Office Visit                               Recent Outpatient Visits              2 weeks ago Hospital discharge follow-up    [facility], Kimmy Gordon MD    Office Visit    4 weeks ago Post-traumatic osteoarthritis of right knee    [facility], Darwin Kay MD    Office Visit    1 month ago Chest pain, unspecified type    [facility]    [facility]    4 months ago [facility]igue, unspecified type    [facility]    [facility]    5 months [facility]    [facility] [place] Behar, Alex, MD    Office Visit

## 2024-06-17 NOTE — TELEPHONE ENCOUNTER
Please review. Protocol Failed; No Protocol      Recent fills: 5/22/2024 quantity 30,  5/30/2024 quantity 60  Last Rx written: 5/30/2024  Last office visit: 5/30/2024        Requested Prescriptions   Pending Prescriptions Disp Refills    HYDROcodone-acetaminophen 5-325 MG Oral Tab 60 tablet 0     Sig: Take 1 tablet by mouth every 4 (four) hours as needed.       Controlled Substance Medication Failed - 6/14/2024  8:29 PM        Failed - This medication is a controlled substance - forward to provider to refill                 Recent Outpatient Visits              2 weeks ago Hospital discharge follow-up    [facility], Kimmy Gordon MD    Office Visit    1 month ago Post-traumatic osteoarthritis of right knee    [facility], Darwin Kay MD    Office Visit    1 month ago Chest pain, unspecified type    [facility]    Office Visit    4 months ago Fatigue, unspecified type    [facility]    [facility] Visit    5 months ago Myalgia    [facility] [place] [facility]har, Alex, MD    Office Visit

## 2024-06-27 NOTE — TELEPHONE ENCOUNTER
Peri from [facility] calling to state patient will be discharged from agency services on 07/17/24, will send order for electronic signature patient is doing well, will have met goals.

## 2024-07-20 NOTE — TELEPHONE ENCOUNTER
Duplicate request, previously addressed.      Too soon     Disp Refills Start End    diclofenac 75 MG Oral Tab EC 60 tablet 1 6/15/2024 --    Sig - Route: Take 1 tablet (75 mg total) by mouth 2 (two) times daily. - Oral    Sent to pharmacy as: Diclofenac Sodium 75 MG Oral Tablet Delayed Release (Voltaren)    E-Prescribing Status: Receipt confirmed by pharmacy (6/15/2024  7:00 AM CDT)      Pharmacy    [facility] [address] [address] [place] [address], [phone], [phone]

## 2024-08-01 NOTE — TELEPHONE ENCOUNTER
With  [place] ID#[id number]    Patient asking for refill for her pain medication, but doesn't know the name of medication only that it is a round small coffee colored.  Patient states she needs medication for her shoulder pain. Patient states her daughter knows the name, but is at work and she won't answer the phone.      Patient sates she will try to find the medication container and call back.

## 2024-08-03 NOTE — PROGRESS NOTES
Chief Complaint   Patient presents with    Medication Follow-Up     HPI:   Lizbeth Matute is a 79 year old female who presents to clinic with complaints of right shoulder pain.    Had a steroid injection to the right shoulder with physical medicine and rehab which helped but the pain started up again.  Typically takes diclofenac for severe pain.  Needs a referral to see an  for new right eye prosthesis.    The current one she is using this about 15 years old.    REVIEW OF SYSTEMS:   Negative, except per HPI.     EXAM:   /69 (BP Location: Right arm, Patient Position: Sitting, Cuff Size: adult)   Pulse 73   Wt 129 lb (58.5 kg)   BMI 25.19 kg/m²   Body mass index is 25.19 kg/m².  GENERAL: well developed, well nourished, in no apparent distress  SKIN: no rashes, no suspicious lesions  HEENT: atraumatic, normocephalic  EYES: PERRLA, EOMI,conjunctiva are clear  NECK: supple, no adenopathy  LUNGS: clear to auscultation  CARDIO: RRR without murmur    ASSESSMENT AND PLAN:     1. Acute pain of right shoulder  - diclofenac 75 MG Oral Tab EC; Take 1 tablet (75 mg total) by mouth 2 (two) times daily as needed (con comida).  Dispense: 120 tablet; Refill: 1    2. Need for shingles vaccine  - Zoster Vac Recomb Adjuvanted (SHINGRIX) 50 MCG/0.5ML Intramuscular Recon Susp; Inject 0.5 mL into the muscle one time for 1 dose.  Dispense: 1 each; Refill: 0    3. Eye globe prosthesis  - Specialty Other Referral - In Network     RTC if no improvement in symptoms. Red flags discussed to go to ER.     Kimmy Talbot MD  8/3/2024  11:40 AM

## 2024-11-06 NOTE — TELEPHONE ENCOUNTER
Action Requested: Summary for Provider     []  Critical Lab, Recommendations Needed  [] Need Additional Advice  [x]   FYI    []   Need Orders  [] Need Medications Sent to Pharmacy  []  Other     SUMMARY:   Spoke with patient  and her care giver via  Angel ID # [id number], Date of Birth verified.  She is looking for appt due to high and low BP and at night she sweat all over, she felt bad about this.     Also spoke with Pat (care taker) she stated patient BP today 123/78, 133/76  and 112/43.   Last noc at 9:42 pm 150/56, 9:47 pm 131/45, at 738 pm 112/43  11/4  161/52.  She stated patient has a little headache on left side with rate 8/10, buzzing sound on her ear, patient had bad night last noc.   She doesn't want to take tylenol for now.   She sleep a little then woke up sweaty, bed sheet all wet like she took a shower.  She has low energy.   Care giver doesn't know the name of BP names, patient daughter is in charge of patient meds.  Also patient has neck pain last noc.    She takes pain med in the morning.   Patient  denies chest pain, shortness of breath, numbness/ tingling, no other symptom.     She was advised if symptom persist or gets worse to go to ER/ IC or call 911, she agreed and stated understanding.   Per patient request appt made with Andrey POLLOCK for eval & treat, patient daughter will take her to the office.         Reason for call: high BP  Onset: 3-4 days      Reason for Disposition   Patient wants to be seen    Protocols used: Blood Pressure - High-A-OH    Future Appointments   Date Time Provider Department Center   11/7/2024  4:40 PM Andrey Kline APRN [facility]   11/14/2024  9:00 AM Behar, Alex, MD [facility]

## 2024-11-07 NOTE — PROGRESS NOTES
Subjective:   Lizbeth Matute is a 79 year old female who presents for Blood Pressure (Lately its been high, past 3 days )   Patient is a 79-year-old female with past medical history consistent for arthritis, dementia, depression, hypertension, hyperlipidemia, fibromyalgia, TIA, brain tumor, CNS lymphoma, osteoarthritis, hyperparathyroidism, and reflux.  Patient presents office today new to this provider for blood pressure check.  Review of telephone encounter reveals    \"Spoke with patient  and her care giver via  Angel ID # [id number], Date of Birth verified.  She is looking for appt due to high and low BP and at night she sweat all over, she felt bad about this.      Also spoke with Pat (care taker) she stated patient BP today 123/78, 133/76  and 112/43.   Last noc at 9:42 pm 150/56, 9:47 pm 131/45, at 738 pm 112/43  11/4  161/52.  She stated patient has a little headache on left side with rate 8/10, buzzing sound on her ear, patient had bad night last noc.   She doesn't want to take tylenol for now.   She sleep a little then woke up sweaty, bed sheet all wet like she took a shower.  She has low energy.   Care giver doesn't know the name of BP names, patient daughter is in charge of patient meds.  Also patient has neck pain last noc.    She takes pain med in the morning.   Patient  denies chest pain, shortness of breath, numbness/ tingling, no other symptom.      She was advised if symptom persist or gets worse to go to ER/ IC or call 911, she agreed and stated understanding.   Per patient request appt made with Andrey POLLOCK for eval & treat, patient daughter will take her to the office.\"    Patient states she is having issues with Blood pressure. She is getting high readings at certain times. She has been more anxious about this lately and continually checks it. When she continues to check it later in the day it can get higher. She denies CP, SOB, HA, and dizziness. She takes both  BP meds in am. Also recently had a cold. She is not on treatment for lymphoma. She follows with onc out of [place] and reports an appt next month. Her BP is normal in office today and exam normal. Recommended taking BP meds at different times in day one night and one day. Will check baseline labs for diaphoresis.     Are you on chemo?  What you take for blood pressure and when?  Lymphomas can definitely cause night sweats.          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 , PROSTHETIC EYE RT      Past Surgical History:   Procedure Laterality Date    Brain surgery  10/31/2016    Right frontal craniotomy for tumor resection    Cholecystectomy  2012    Colonoscopy      at [facility] about 8 years ago    Colonoscopy N/A 2/15/2023    Procedure: COLONOSCOPY;  Surgeon: Radha Sr MD;  Location: [facility] ENDOSCOPY    Hysterectomy      Knee surgery Right 10/2022    Other surgical history      Bionic Eye    Other surgical history      lymphoma biopsy    Other surgical history      3 RT hand surgeries for broken fingers     Tubal ligation          History/Other:    Chief Complaint Reviewed and Verified  Nursing Notes Reviewed and   Verified  Tobacco Reviewed  Allergies Reviewed  Medications Reviewed    Problem List Reviewed  Medical History Reviewed  Surgical History   Reviewed  OB Status Reviewed  Family History Reviewed  Social History   Reviewed         Tobacco:  She has never smoked tobacco.    Current Outpatient  Medications   Medication Sig Dispense Refill    diclofenac 75 MG Oral Tab EC Take 1 tablet (75 mg total) by mouth 2 (two) times daily as needed (con comida). 120 tablet 1    NIFEdipine ER 60 MG Oral Tablet 24 Hr Take 1 tablet (60 mg total) by mouth daily.      CALCIUM-VITAMIN D OR Take 1 tablet by mouth daily.      Multiple Vitamins-Minerals (MULTIPLE VITAMINS/WOMENS) Oral Tab Take 1 tablet by mouth daily.      Cholecalciferol (VITAMIN D3) 25 MCG (1000 UT) Oral Cap Take 1 tablet by mouth daily.      diclofenac 0.1 % Ophthalmic Solution Place 1 drop into the left eye daily as needed.      Turmeric (QC TUMERIC COMPLEX) 500 MG Oral Cap Take 1 tablet by mouth 2 (two) times daily.      Fexofenadine HCl (ALLERGY 24-HR OR) Take 1 tablet by mouth daily.      pregabalin 100 MG Oral Cap Take 1 capsule (100 mg total) by mouth 2 (two) times daily. 60 capsule 11    alendronate 70 MG Oral Tab Take 1 tablet (70 mg total) by mouth every 7 days. 12 tablet 3    cinacalcet 30 MG Oral Tab Take 0.5 tablets (15 mg total) by mouth daily. 45 tablet 3    donepezil 10 MG Oral Tab Take 1 tablet (10 mg total) by mouth every evening. 90 tablet 3    lisinopril 20 MG Oral Tab Take 1 tablet (20 mg total) by mouth daily. 90 tablet 3    sertraline 100 MG Oral Tab Take 1 tablet (100 mg total) by mouth nightly. 90 tablet 3    ZINC OR Take 1 tablet by mouth daily.      HYDROcodone-acetaminophen 5-325 MG Oral Tab Take 1 tablet by mouth every 4 (four) hours as needed. (Patient not taking: Reported on 11/7/2024) 60 tablet 0    famotidine 20 MG Oral Tab Take 1 tablet (20 mg total) by mouth 2 (two) times daily. (Patient not taking: Reported on 11/7/2024) 90 tablet 3    oxyCODONE 5 MG Oral Tab Take 1 tablet (5 mg total) by mouth 2 (two) times daily as needed for Pain (For severe pain only, can take Tylenol for breakthrough pain). (Patient not taking: Reported on 11/7/2024) 30 tablet 0    polyethylene glycol, PEG 3350, 17 g Oral Powd Pack Take 17 g by mouth  daily as needed (If no bowel movement in last 24 hours). (Patient not taking: Reported on 11/7/2024) 10 each 0    diclofenac 1 % External Gel Apply 2 g topically 4 (four) times daily. (Patient taking differently: Apply 2 g topically 4 (four) times daily. To right knee) 150 g 2         Review of Systems:  Review of Systems   Constitutional:  Positive for diaphoresis. Negative for activity change, appetite change, chills and fever.   HENT: Negative.  Negative for congestion, postnasal drip, rhinorrhea, sore throat, tinnitus and voice change.    Eyes: Negative.    Respiratory: Negative.  Negative for apnea, cough, chest tightness and shortness of breath.    Cardiovascular:  Negative for chest pain and leg swelling.   Gastrointestinal: Negative.  Negative for abdominal pain, anal bleeding, blood in stool, constipation, diarrhea, nausea and vomiting.   Genitourinary: Negative.  Negative for difficulty urinating, flank pain and menstrual problem.   Musculoskeletal: Negative.  Negative for joint swelling.   Skin: Negative.    Neurological: Negative.  Negative for dizziness and headaches.   Psychiatric/Behavioral: Negative.  Negative for agitation.          Objective:   /76 (BP Location: Right arm, Patient Position: Sitting, Cuff Size: adult)   Pulse 61   Ht 5' (1.524 m)   Wt 126 lb 12.8 oz (57.5 kg)   SpO2 98%   BMI 24.76 kg/m²  Estimated body mass index is 24.76 kg/m² as calculated from the following:    Height as of this encounter: 5' (1.524 m).    Weight as of this encounter: 126 lb 12.8 oz (57.5 kg).  Physical Exam  Vitals and nursing note reviewed.   Constitutional:       Appearance: Normal appearance. She is normal weight.   HENT:      Head: Normocephalic and atraumatic.      Right Ear: Tympanic membrane, ear canal and external ear normal. There is no impacted cerumen.      Left Ear: Tympanic membrane, ear canal and external ear normal. There is no impacted cerumen.      Nose: Congestion and rhinorrhea  present.      Mouth/Throat:      Mouth: Mucous membranes are moist.      Pharynx: Oropharynx is clear. No oropharyngeal exudate or posterior oropharyngeal erythema.   Eyes:      Comments: Left eye prosthesis   Cardiovascular:      Rate and Rhythm: Normal rate and regular rhythm.      Pulses: Normal pulses.      Heart sounds: Normal heart sounds. No murmur heard.  Pulmonary:      Effort: Pulmonary effort is normal.      Breath sounds: Normal breath sounds.   Abdominal:      General: Abdomen is flat.      Palpations: Abdomen is soft.   Musculoskeletal:         General: Normal range of motion.   Skin:     General: Skin is warm and dry.      Capillary Refill: Capillary refill takes less than 2 seconds.   Neurological:      Mental Status: She is alert and oriented to person, place, and time.           Assessment & Plan:   1. Blood pressure check (Primary)  -     Comp Metabolic Panel (14); Future; Expected date: 11/07/2024  2. Night sweats  -     CBC With Differential With Platelet; Future; Expected date: 11/07/2024  -     Comp Metabolic Panel (14); Future; Expected date: 11/07/2024  -     TSH W Reflex To Free T4; Future; Expected date: 11/07/2024    1. Blood pressure check  BP WNL in office today  Advised to check BP at home and on proper technique  Advised on BP med in am and second at night  Notify office if consistently over 140/90  - Comp Metabolic Panel (14) [E]; Future    2. Night sweats  Likelt secondary to lymphoma  Check baseline labs and TSH  Lymphoma per onc  - CBC With Differential With Platelet; Future  - Comp Metabolic Panel (14) [E]; Future  - TSH W Reflex To Free T4; Future    PHILL Vásquez        Return for Annual physical.    PHILL Vásquez, 11/6/2024, 9:31 PM

## 2024-11-14 NOTE — PATIENT INSTRUCTIONS
1) My office will call you to schedule the RIGHT subacromial CSI once the procedure is approved by your insurance carrier.    2) Tylenol 500-1000 mg every 6-8 hours as needed for pain.  No more than 3000 mg daily.  3) Please begin physical therapy as soon as possible.   4) Follow-up with me in 6 to 8 weeks after you  begin physical therapy. If symptoms do not improve, then I would recommend an MRI of the RIGHT shoulder      Instrucciones posteriores a la inyección   No whit nada extenuante le los próximos dos días (si recibió juvenal inyección en la rodilla, no camine más de 2 ion de la ciudad, no asista a clases de aeróbicos, no corra, no levante objetos pesados, no permanezca de pie por mucho tiempo).  -Puede reanudar claudia actividades cotidianas después de la inyección.  -Es posible que experimente juvenal leve hinchazón después del procedimiento.  -Coloque hielo en la articulación que se inyectó al menos 5 a 6 veces al día (15 minutos) le dos días después (esto ayudará a prevenir el empeoramiento del dolor que a veces ocurre después de juvenal inyección).  -Solo tome tylenol si es necesario para el dolor le los primeros días.  -Esté atento a los signos de infección que incluyen enrojecimiento, calor, empeoramiento del dolor, fiebre o escalofríos. Si desarrolla alguno de estos signos, llame a la oficina de inmediato al [phone]  -Todos responden a payne manera diferente a las inyecciones, law puede esperar claudia efectos máximos unas semanas después de payne última inyección.  Alex B. Behar MD  Physical Medicine and Rehabilitation/Sports Medicine  [facility]

## 2024-11-14 NOTE — PATIENT INSTRUCTIONS
Patient was instructed in and issued a HEP for: (R) Shoulder repeated internal rotation x 10 reps 4-6x/day.

## 2024-11-14 NOTE — PROGRESS NOTES
UPPER EXTREMITY EVALUATION:   Referring Physician:  Behar, A. MD  Diagnosis:  Myalgia (M79.10)  Subacromial bursitis of right shoulder joint (M75.51)  Tendinopathy of rotator cuff, unspecified laterality (M67.919)  Chronic right shoulder pain (M25.511,G89.29)  Chronic pain of both shoulders (M25.511,G89.29,M25.512)     Date of Onset: 3 months ago Date of Service: 11/14/2024     PATIENT SUMMARY   Lizbeth Matute is a 79 year old y/o female who presents to therapy today with complaints of intermittent (R) anterior and posterior shoulder pain/ache rated 0-6/10.  She also report ache/stiffness in the upper back.  She denies any numbness or tingling at this time.  Lizbeth does normal home chores/duties and likes to watch TV sitting on a sofa.     History of current condition: Lizbeth report that about 2-3 months ago she started feeling pain/ache in the (R) shoulder for no apparent reason.  She state that it is worse now than when it started.  She was given a cortisone shot today (11/14/2024) and is now referred to outpatient physical therapy for evaluation and treatment.    Previous history:  (R) TKR over a year ago.    Current functional limitations reported include the inability to reach up and sideways, carry, lift, dress up, clean, and sleep on her sides without producing or increasing symptoms in the (R) shoulder.       ASSESSMENT:   Lizbeth is presenting with a (R) shoulder derangement with a directional preference toward internal rotation.  Her (R) shoulder symptoms reduced to almost none and flexion ROM increased to nil loss after this directional preference exercise.  Explained to Lizbeth the importance of doing her directional preference exercise consistently to have a good carryover of her improved symptoms.    Lizbeth Matute will be re-assessed next session and will be progressed or modified according to her presentation.  She understood and agreed with the plan of care.  All questions and  concerns were answered and addressed at this time.        Lizbeth would benefit from skilled Physical Therapy to address the above impairments.    Precautions: None     OBJECTIVE:   Observation/Posture: Slouched, forward head, kyphotic, protruded shoulders.      Cervical Screen:  CROM: Extension: Moderate loss; P, NW mid neck      (L) Rotation and Sidebending: Moderate loss; P, NW (L) neck      (R) Sidebending: Minimal loss; NE      ROM/MMT: (Pre-test symptom: 0/10 (R) Shoulder)  Shoulder    Flexion:  Minimal loss; P, NW (PDM) on the way down  Extension:  Moderate loss; P, NW (ERP)     Abduction:  Minimal loss; P, NW (PDM) on the way up   IR:  Major loss; P, NW (ERP)   ER:  Nil loss; NE      Repeated motion testing/Treatment: (R) Shoulder repeated internal rotation 2 x 10 reps; P, NW (better); increase in flexion and abduction to nil loss; NE.     Patient education and instructions: Patient education provided on derangement principle, directional preference exercise, establishing a test motion to assess improvement, POC, goal setting with patient, and HEP,    Patient was instructed in and issued a HEP for: (R) Shoulder repeated internal rotation x 10 reps 4-6x/day.     Charges: PT Eval (Low) x1, TherEx x 1      Total Timed Treatment: 15 mins     Total Treatment Time: 45 mins     PLAN OF CARE:    Goals: (12 visits)  1. Patient to consistently perform their HEP and it's progression to maintain their improved condition.  2. Patient to have reduced and abolished (R) Shoulder symptoms to be able to reach up and sideways, carry, lift, dress up, clean, and sleep on her sides without producing or increasing symptoms in the (R) Shoulder.    3. Patient to have WNL of (R) Shoulder AROM in all directions with 4/5 MMT in all motions to promote all home, work, recreational,and functional activities without discomfort or deviation.     Patient was advised of these findings, precautions, and treatment options and has agreed to  actively participate in planning/goal setting for this course of care.    Frequency / Duration: Patient will be seen for 2-1 x/week or a total of 12 visits over a 90 day period. Treatment will include: Directional preference exercises; Therapeutic Exercises; Neuromuscular Re-education; Patient education; Home exercise program instruction.    Education or treatment limitation: Communication  Rehab Potential: good    In agreement with functional assessment testing score and clinical rationale, this evaluation involved Low Complexity decision making due to 1-2 personal factors/comorbidities, 3 body structures involved/activity limitations, and evolving symptoms including changing pain levels.    Quickdash Score (100-0): Initial: 38.64    Patient was advised of these findings, precautions, and treatment options and has agreed to actively participate in planning and for this course of care.    Thank you for your referral. Please co-sign or sign and return this letter via fax as soon as possible to [phone]. If you have any questions, please contact me at Dept: [phone]    Sincerely,  Electronically signed by therapist: HUMBERTO MENA PT Cert MDT    Physician's certification required: Yes  I certify the need for these services furnished under this plan of treatment and while under my care.    X___________________________________________________ Date____________________    Certification From: 11/14/2024  To:2/12/2025

## 2024-11-14 NOTE — PROGRESS NOTES
[facility]  Progress Note    CHIEF COMPLAINT:    Chief Complaint   Patient presents with    Follow - Up     Lov 1/3/24 Right shoulder pain that radiates down to arm.Pain 4/10. No T/N. Tylenol at night daily with relief. Patient states her pain is worse at bed time .        History of Present Illness:  The patient is a 79 year old  female with a significant past medical history of hypertension, hyperlipidemia, TIA, dementia, and lymphoma who presents with complaints of right shoulder pain which has been aggravated over the last few weeks without an inciting event.  She rates her pain a 4 out of 10.  Symptoms are aggravated with reaching above her head or across her body.  She also has difficulty laying on the right side.  She continues to have limitations in range of motion.  She is status post right subacromial injection back in January 2020 for which she found to be very helpful.    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 , PROSTHETIC EYE RT       SURGICAL HISTORY:  Past Surgical History:   Procedure Laterality Date    Brain surgery  10/31/2016    Right frontal craniotomy for tumor resection    Cholecystectomy  2012    Colonoscopy      at [facility] about 8 years ago    Colonoscopy N/A 2/15/2023    Procedure: COLONOSCOPY;  Surgeon: Radha Sr MD;  Location: [facility] ENDOSCOPY    Hysterectomy      Knee surgery Right 10/2022    Other surgical history       Bionic Eye    Other surgical history      lymphoma biopsy    Other surgical history      3 RT hand surgeries for broken fingers     Tubal ligation         SOCIAL HISTORY:   Social History     Occupational History    Not on file   Tobacco Use    Smoking status: Never     Passive exposure: Never    Smokeless tobacco: Never   Vaping Use    Vaping status: Never Used   Substance and Sexual Activity    Alcohol use: Not Currently     Alcohol/week: 0.0 standard drinks of alcohol    Drug use: No    Sexual activity: Not on file       FAMILY HISTORY:   Family History   Problem Relation Age of Onset    Cancer Mother         uterine cancer    Arthritis Father         Rheumatoid    Other (Other) Maternal Grandmother     Other (Other) Maternal Grandfather     Other (Other) Paternal Grandmother     Other (Other) Paternal Grandfather     Cancer Cousin         breast cancer    Diabetes Neg     Glaucoma Neg     Macular degeneration Neg        CURRENT MEDICATIONS:   Current Outpatient Medications   Medication Sig Dispense Refill    diclofenac 75 MG Oral Tab EC Take 1 tablet (75 mg total) by mouth 2 (two) times daily as needed (con comida). 120 tablet 1    famotidine 20 MG Oral Tab Take 1 tablet (20 mg total) by mouth 2 (two) times daily. 90 tablet 3    NIFEdipine ER 60 MG Oral Tablet 24 Hr Take 1 tablet (60 mg total) by mouth daily.      CALCIUM-VITAMIN D OR Take 1 tablet by mouth daily.      Multiple Vitamins-Minerals (MULTIPLE VITAMINS/WOMENS) Oral Tab Take 1 tablet by mouth daily.      Cholecalciferol (VITAMIN D3) 25 MCG (1000 UT) Oral Cap Take 1 tablet by mouth daily.      diclofenac 0.1 % Ophthalmic Solution Place 1 drop into the left eye daily as needed.      Turmeric (QC TUMERIC COMPLEX) 500 MG Oral Cap Take 1 tablet by mouth 2 (two) times daily.      Fexofenadine HCl (ALLERGY 24-HR OR) Take 1 tablet by mouth daily.      diclofenac 1 % External Gel Apply 2 g topically 4 (four) times daily. (Patient taking differently: Apply 2  g topically 4 (four) times daily. To right knee) 150 g 2    alendronate 70 MG Oral Tab Take 1 tablet (70 mg total) by mouth every 7 days. 12 tablet 3    cinacalcet 30 MG Oral Tab Take 0.5 tablets (15 mg total) by mouth daily. 45 tablet 3    donepezil 10 MG Oral Tab Take 1 tablet (10 mg total) by mouth every evening. 90 tablet 3    lisinopril 20 MG Oral Tab Take 1 tablet (20 mg total) by mouth daily. 90 tablet 3    sertraline 100 MG Oral Tab Take 1 tablet (100 mg total) by mouth nightly. 90 tablet 3    ZINC OR Take 1 tablet by mouth daily.      pregabalin 100 MG Oral Cap Take 1 capsule (100 mg total) by mouth 2 (two) times daily. 60 capsule 11       ALLERGIES:   Allergies[1]    REVIEW OF SYSTEMS:   Review of Systems   Constitutional: Negative.    HENT: Negative.    Eyes: Negative.    Respiratory: Negative.    Cardiovascular: Negative.    Gastrointestinal: Negative.    Genitourinary: Negative.    Musculoskeletal: As per HPI  Skin: Negative.    Neurological: As per HPI  Endo/Heme/Allergies: Negative.    Psychiatric/Behavioral: Negative.      All other systems reviewed and are negative. Pertinent positives and negatives noted in the HPI.        PHYSICAL EXAM:   Wt 126 lb (57.2 kg)   BMI 24.61 kg/m²     Body mass index is 24.61 kg/m².      General: No immediate distress  Head: Normocephalic/ Atraumatic  Eyes: Extra-occular movements intact.   Ears: No auricular hematoma or deformities  Mouth: No lesions or ulcerations  Heart: peripheral pulses intact. Normal capillary refill.   Lungs: Non-labored respirations  Abdomen: No abdominal guarding  Extremities: No lower extremity edema bilaterally   Skin: No lesions noted.   Cognition: alert & oriented x 3, attentive, able to follow 2 step commands, comprehention intact, spontaneous speech intact  Motor:    Musculoskeletal:        Data  Admission on 05/20/2024, Discharged on 05/22/2024   Component Date Value Ref Range Status    Glucose 05/20/2024 99  70 - 99 mg/dL Final     Sodium 05/20/2024 140  136 - 145 mmol/L Final    Potassium 05/20/2024 4.4  3.5 - 5.1 mmol/L Final    Chloride 05/20/2024 108  98 - 112 mmol/L Final    CO2 05/20/2024 27.0  21.0 - 32.0 mmol/L Final    Anion Gap 05/20/2024 5  0 - 18 mmol/L Final    BUN 05/20/2024 23  9 - 23 mg/dL Final    Creatinine 05/20/2024 1.02  0.55 - 1.02 mg/dL Final    BUN/CREA Ratio 05/20/2024 22.5 (H)  10.0 - 20.0 Final    Calcium, Total 05/20/2024 10.4  8.7 - 10.4 mg/dL Final    Calculated Osmolality 05/20/2024 294  275 - 295 mOsm/kg Final    eGFR-Cr 05/20/2024 57 (L)  >=60 mL/min/1.73m2 Final    WBC 05/20/2024 5.1  4.0 - 11.0 x10(3) uL Final    RBC 05/20/2024 4.04  3.80 - 5.30 x10(6)uL Final    HGB 05/20/2024 12.4  12.0 - 16.0 g/dL Final    HCT 05/20/2024 37.5  35.0 - 48.0 % Final    MCV 05/20/2024 92.8  80.0 - 100.0 fL Final    MCH 05/20/2024 30.7  26.0 - 34.0 pg Final    MCHC 05/20/2024 33.1  31.0 - 37.0 g/dL Final    RDW-SD 05/20/2024 43.9  35.1 - 46.3 fL Final    RDW 05/20/2024 12.8  11.0 - 15.0 % Final    PLT 05/20/2024 131.0 (L)  150.0 - 450.0 10(3)uL Final    Immature Platelet Fraction 05/20/2024 2.6  0.0 - 7.0 % Final    Neutrophil Absolute Prelim 05/20/2024 2.79  1.50 - 7.70 x10 (3) uL Final    Neutrophil Absolute 05/20/2024 2.79  1.50 - 7.70 x10(3) uL Final    Lymphocyte Absolute 05/20/2024 1.55  1.00 - 4.00 x10(3) uL Final    Monocyte Absolute 05/20/2024 0.53  0.10 - 1.00 x10(3) uL Final    Eosinophil Absolute 05/20/2024 0.15  0.00 - 0.70 x10(3) uL Final    Basophil Absolute 05/20/2024 0.02  0.00 - 0.20 x10(3) uL Final    Immature Granulocyte Absolute 05/20/2024 0.04  0.00 - 1.00 x10(3) uL Final    Neutrophil % 05/20/2024 54.9  % Final    Lymphocyte % 05/20/2024 30.5  % Final    Monocyte % 05/20/2024 10.4  % Final    Eosinophil % 05/20/2024 3.0  % Final    Basophil % 05/20/2024 0.4  % Final    Immature Granulocyte % 05/20/2024 0.8  % Final    Glucose 05/21/2024 84  70 - 99 mg/dL Final    Sodium 05/21/2024 139  136 - 145 mmol/L  Final    Potassium 05/21/2024 4.1  3.5 - 5.1 mmol/L Final    Chloride 05/21/2024 109  98 - 112 mmol/L Final    CO2 05/21/2024 28.0  21.0 - 32.0 mmol/L Final    Anion Gap 05/21/2024 2  0 - 18 mmol/L Final    BUN 05/21/2024 18  9 - 23 mg/dL Final    Creatinine 05/21/2024 0.84  0.55 - 1.02 mg/dL Final    BUN/CREA Ratio 05/21/2024 21.4 (H)  10.0 - 20.0 Final    Calcium, Total 05/21/2024 9.3  8.7 - 10.4 mg/dL Final    Calculated Osmolality 05/21/2024 289  275 - 295 mOsm/kg Final    eGFR-Cr 05/21/2024 72  >=60 mL/min/1.73m2 Final    Magnesium 05/21/2024 1.8  1.6 - 2.6 mg/dL Final    WBC 05/21/2024 4.9  4.0 - 11.0 x10(3) uL Final    RBC 05/21/2024 3.87  3.80 - 5.30 x10(6)uL Final    HGB 05/21/2024 11.4 (L)  12.0 - 16.0 g/dL Final    HCT 05/21/2024 36.1  35.0 - 48.0 % Final    MCV 05/21/2024 93.3  80.0 - 100.0 fL Final    MCH 05/21/2024 29.5  26.0 - 34.0 pg Final    MCHC 05/21/2024 31.6  31.0 - 37.0 g/dL Final    RDW-SD 05/21/2024 44.4  35.1 - 46.3 fL Final    RDW 05/21/2024 12.9  11.0 - 15.0 % Final    PLT 05/21/2024 122.0 (L)  150.0 - 450.0 10(3)uL Final    Neutrophil Absolute Prelim 05/21/2024 2.97  1.50 - 7.70 x10 (3) uL Final    Neutrophil Absolute 05/21/2024 2.97  1.50 - 7.70 x10(3) uL Final    Lymphocyte Absolute 05/21/2024 1.15  1.00 - 4.00 x10(3) uL Final    Monocyte Absolute 05/21/2024 0.53  0.10 - 1.00 x10(3) uL Final    Eosinophil Absolute 05/21/2024 0.22  0.00 - 0.70 x10(3) uL Final    Basophil Absolute 05/21/2024 0.03  0.00 - 0.20 x10(3) uL Final    Immature Granulocyte Absolute 05/21/2024 0.02  0.00 - 1.00 x10(3) uL Final    Neutrophil % 05/21/2024 60.3  % Final    Lymphocyte % 05/21/2024 23.4  % Final    Monocyte % 05/21/2024 10.8  % Final    Eosinophil % 05/21/2024 4.5  % Final    Basophil % 05/21/2024 0.6  % Final    Immature Granulocyte % 05/21/2024 0.4  % Final    Magnesium 05/22/2024 1.7  1.6 - 2.6 mg/dL Final    Glucose 05/22/2024 90  70 - 99 mg/dL Final    Sodium 05/22/2024 139  136 - 145 mmol/L Final     Potassium 05/22/2024 4.3  3.5 - 5.1 mmol/L Final    Chloride 05/22/2024 108  98 - 112 mmol/L Final    CO2 05/22/2024 26.0  21.0 - 32.0 mmol/L Final    Anion Gap 05/22/2024 5  0 - 18 mmol/L Final    BUN 05/22/2024 17  9 - 23 mg/dL Final    Creatinine 05/22/2024 0.81  0.55 - 1.02 mg/dL Final    BUN/CREA Ratio 05/22/2024 21.0 (H)  10.0 - 20.0 Final    Calcium, Total 05/22/2024 9.8  8.7 - 10.4 mg/dL Final    Calculated Osmolality 05/22/2024 289  275 - 295 mOsm/kg Final    eGFR-Cr 05/22/2024 75  >=60 mL/min/1.73m2 Final    WBC 05/22/2024 4.3  4.0 - 11.0 x10(3) uL Final    RBC 05/22/2024 3.80  3.80 - 5.30 x10(6)uL Final    HGB 05/22/2024 11.2 (L)  12.0 - 16.0 g/dL Final    HCT 05/22/2024 34.7 (L)  35.0 - 48.0 % Final    MCV 05/22/2024 91.3  80.0 - 100.0 fL Final    MCH 05/22/2024 29.5  26.0 - 34.0 pg Final    MCHC 05/22/2024 32.3  31.0 - 37.0 g/dL Final    RDW-SD 05/22/2024 42.4  35.1 - 46.3 fL Final    RDW 05/22/2024 12.9  11.0 - 15.0 % Final    PLT 05/22/2024 126.0 (L)  150.0 - 450.0 10(3)uL Final    Immature Platelet Fraction 05/22/2024 3.4  0.0 - 7.0 % Final    Neutrophil Absolute Prelim 05/22/2024 2.35  1.50 - 7.70 x10 (3) uL Final    Neutrophil Absolute 05/22/2024 2.35  1.50 - 7.70 x10(3) uL Final    Lymphocyte Absolute 05/22/2024 1.28  1.00 - 4.00 x10(3) uL Final    Monocyte Absolute 05/22/2024 0.49  0.10 - 1.00 x10(3) uL Final    Eosinophil Absolute 05/22/2024 0.13  0.00 - 0.70 x10(3) uL Final    Basophil Absolute 05/22/2024 0.03  0.00 - 0.20 x10(3) uL Final    Immature Granulocyte Absolute 05/22/2024 0.01  0.00 - 1.00 x10(3) uL Final    Neutrophil % 05/22/2024 54.9  % Final    Lymphocyte % 05/22/2024 29.8  % Final    Monocyte % 05/22/2024 11.4  % Final    Eosinophil % 05/22/2024 3.0  % Final    Basophil % 05/22/2024 0.7  % Final    Immature Granulocyte % 05/22/2024 0.2  % Final   ]      Radiology Imaging:  I reviewed with the patient her X-ray of the shoulder right   PROCEDURE: XR SHOULDER, COMPLETE (MIN 2  VIEWS), RIGHT (CPT=73030)     COMPARISON: [facility], XR SHOULDER, COMPLETE (MIN 2 VIEWS), RIGHT (CPT=73030), 10/13/2021, 3:43 PM.     INDICATIONS: Right lateral shoulder pain post fall x1day.     TECHNIQUE: 3 views were obtained.       FINDINGS:  BONES: No acute appearing fracture or dislocation.  Mild narrowing of the glenohumeral joint.  Mild-to-moderate degenerative narrowing of the AC joint.  SOFT TISSUES: Soft tissue calcification superior to the humeral head in the course of the rotator cuff may suggest chronic calcific rotator cuff tendinopathy.  Correlate clinically.  EFFUSION: None visible.  OTHER: Negative.               Impression   CONCLUSION:  1. No acute appearing fracture or dislocation.  Lesser findings as described above.  See above.           Dictated by (CST): Alex Beard MD on 4/11/2024 at 11:08 AM      Finalized by (CST): Alex Beard MD on 4/11/2024 at 11:09 AM         ASSESSMENT AND PLAN:  Lizbeth is a pleasant 79-year-old female presents with right shoulder pain which I believe is due to rotator cuff tendinopathy and a rotator cuff tear with impingement syndrome although she may have some glenohumeral osteoarthritis and adhesive capsulitis given the decreased external rotation.  I have recommended formal physical therapy and a subacromial injection which we performed today.  Please see separate procedure note.  She will let me know also what medications she is taking for pain as she states she is unsure since her daughter gives her this every day.  She will follow-up with me in about 2 months.       RTC in 2 months  Discharge Instructions were provided as documented in AVS summary.  The patient was in agreement with the assessment and plan.  All questions were answered.  There were no barriers to learning.         1. Myalgia    2. Subacromial bursitis of right shoulder joint    3. Tendinopathy of rotator cuff, unspecified laterality    4. Chronic right shoulder pain    5.  Chronic pain of both shoulders        Alex B. Behar MD  Physical Medicine and Rehabilitation/Sports Medicine  [facility]         [1]   Allergies  Allergen Reactions    Dust Mite Extract Runny nose    Latex RASH     itching    Latex ITCHING     If exposed for long time     Seasonal UNKNOWN

## 2024-11-14 NOTE — TELEPHONE ENCOUNTER
Per CMS Guidelines -no authorization is required for  Right subacromial corticosteroid injection.  CPT codes: 79768,   Completed in the office        Status: Authorization is not required based on medical necessity however is not a guarantee of payment and may be subject to review once claim is submitted-Covered Benefit.

## 2024-11-14 NOTE — PROCEDURES
[facility]   Shoulder Injection Procedure Note    CHIEF COMPLAINT:    Chief Complaint   Patient presents with    Follow - Up     Lov 1/3/24 Right shoulder pain that radiates down to arm.Pain 4/10. No T/N. Tylenol at night daily with relief. Patient states her pain is worse at bed time .        PROCEDURE PERFORMED:  Right subacromial corticosteroid injection     INDICATIONS:  Right shoulder pain    PRIMARY PROCEDURALIST:  Alex Behar, MD    INFORMED CONSENT & TIME OUT:   As documented in the Time Out and Pre-Procedure Check Lists.  Verbal consent was obtained    Vitals: [unfilled]  Labs (document last wbc, plts, hgb, and PT/INR):   Admission on 05/20/2024, Discharged on 05/22/2024   Component Date Value Ref Range Status    Glucose 05/20/2024 99  70 - 99 mg/dL Final    Sodium 05/20/2024 140  136 - 145 mmol/L Final    Potassium 05/20/2024 4.4  3.5 - 5.1 mmol/L Final    Chloride 05/20/2024 108  98 - 112 mmol/L Final    CO2 05/20/2024 27.0  21.0 - 32.0 mmol/L Final    Anion Gap 05/20/2024 5  0 - 18 mmol/L Final    BUN 05/20/2024 23  9 - 23 mg/dL Final    Creatinine 05/20/2024 1.02  0.55 - 1.02 mg/dL Final    BUN/CREA Ratio 05/20/2024 22.5 (H)  10.0 - 20.0 Final    Calcium, Total 05/20/2024 10.4  8.7 - 10.4 mg/dL Final    Calculated Osmolality 05/20/2024 294  275 - 295 mOsm/kg Final    eGFR-Cr 05/20/2024 57 (L)  >=60 mL/min/1.73m2 Final    WBC 05/20/2024 5.1  4.0 - 11.0 x10(3) uL Final    RBC 05/20/2024 4.04  3.80 - 5.30 x10(6)uL Final    HGB 05/20/2024 12.4  12.0 - 16.0 g/dL Final    HCT 05/20/2024 37.5  35.0 - 48.0 % Final    MCV 05/20/2024 92.8  80.0 - 100.0 fL Final    MCH 05/20/2024 30.7  26.0 - 34.0 pg Final    MCHC 05/20/2024 33.1  31.0 - 37.0 g/dL Final    RDW-SD 05/20/2024 43.9  35.1 - 46.3 fL Final    RDW 05/20/2024 12.8  11.0 - 15.0 % Final    PLT 05/20/2024 131.0 (L)  150.0 - 450.0 10(3)uL Final    Immature Platelet Fraction 05/20/2024 2.6  0.0 - 7.0 % Final     Neutrophil Absolute Prelim 05/20/2024 2.79  1.50 - 7.70 x10 (3) uL Final    Neutrophil Absolute 05/20/2024 2.79  1.50 - 7.70 x10(3) uL Final    Lymphocyte Absolute 05/20/2024 1.55  1.00 - 4.00 x10(3) uL Final    Monocyte Absolute 05/20/2024 0.53  0.10 - 1.00 x10(3) uL Final    Eosinophil Absolute 05/20/2024 0.15  0.00 - 0.70 x10(3) uL Final    Basophil Absolute 05/20/2024 0.02  0.00 - 0.20 x10(3) uL Final    Immature Granulocyte Absolute 05/20/2024 0.04  0.00 - 1.00 x10(3) uL Final    Neutrophil % 05/20/2024 54.9  % Final    Lymphocyte % 05/20/2024 30.5  % Final    Monocyte % 05/20/2024 10.4  % Final    Eosinophil % 05/20/2024 3.0  % Final    Basophil % 05/20/2024 0.4  % Final    Immature Granulocyte % 05/20/2024 0.8  % Final    Glucose 05/21/2024 84  70 - 99 mg/dL Final    Sodium 05/21/2024 139  136 - 145 mmol/L Final    Potassium 05/21/2024 4.1  3.5 - 5.1 mmol/L Final    Chloride 05/21/2024 109  98 - 112 mmol/L Final    CO2 05/21/2024 28.0  21.0 - 32.0 mmol/L Final    Anion Gap 05/21/2024 2  0 - 18 mmol/L Final    BUN 05/21/2024 18  9 - 23 mg/dL Final    Creatinine 05/21/2024 0.84  0.55 - 1.02 mg/dL Final    BUN/CREA Ratio 05/21/2024 21.4 (H)  10.0 - 20.0 Final    Calcium, Total 05/21/2024 9.3  8.7 - 10.4 mg/dL Final    Calculated Osmolality 05/21/2024 289  275 - 295 mOsm/kg Final    eGFR-Cr 05/21/2024 72  >=60 mL/min/1.73m2 Final    Magnesium 05/21/2024 1.8  1.6 - 2.6 mg/dL Final    WBC 05/21/2024 4.9  4.0 - 11.0 x10(3) uL Final    RBC 05/21/2024 3.87  3.80 - 5.30 x10(6)uL Final    HGB 05/21/2024 11.4 (L)  12.0 - 16.0 g/dL Final    HCT 05/21/2024 36.1  35.0 - 48.0 % Final    MCV 05/21/2024 93.3  80.0 - 100.0 fL Final    MCH 05/21/2024 29.5  26.0 - 34.0 pg Final    MCHC 05/21/2024 31.6  31.0 - 37.0 g/dL Final    RDW-SD 05/21/2024 44.4  35.1 - 46.3 fL Final    RDW 05/21/2024 12.9  11.0 - 15.0 % Final    PLT 05/21/2024 122.0 (L)  150.0 - 450.0 10(3)uL Final    Neutrophil Absolute Prelim 05/21/2024 2.97  1.50 - 7.70  x10 (3) uL Final    Neutrophil Absolute 05/21/2024 2.97  1.50 - 7.70 x10(3) uL Final    Lymphocyte Absolute 05/21/2024 1.15  1.00 - 4.00 x10(3) uL Final    Monocyte Absolute 05/21/2024 0.53  0.10 - 1.00 x10(3) uL Final    Eosinophil Absolute 05/21/2024 0.22  0.00 - 0.70 x10(3) uL Final    Basophil Absolute 05/21/2024 0.03  0.00 - 0.20 x10(3) uL Final    Immature Granulocyte Absolute 05/21/2024 0.02  0.00 - 1.00 x10(3) uL Final    Neutrophil % 05/21/2024 60.3  % Final    Lymphocyte % 05/21/2024 23.4  % Final    Monocyte % 05/21/2024 10.8  % Final    Eosinophil % 05/21/2024 4.5  % Final    Basophil % 05/21/2024 0.6  % Final    Immature Granulocyte % 05/21/2024 0.4  % Final    Magnesium 05/22/2024 1.7  1.6 - 2.6 mg/dL Final    Glucose 05/22/2024 90  70 - 99 mg/dL Final    Sodium 05/22/2024 139  136 - 145 mmol/L Final    Potassium 05/22/2024 4.3  3.5 - 5.1 mmol/L Final    Chloride 05/22/2024 108  98 - 112 mmol/L Final    CO2 05/22/2024 26.0  21.0 - 32.0 mmol/L Final    Anion Gap 05/22/2024 5  0 - 18 mmol/L Final    BUN 05/22/2024 17  9 - 23 mg/dL Final    Creatinine 05/22/2024 0.81  0.55 - 1.02 mg/dL Final    BUN/CREA Ratio 05/22/2024 21.0 (H)  10.0 - 20.0 Final    Calcium, Total 05/22/2024 9.8  8.7 - 10.4 mg/dL Final    Calculated Osmolality 05/22/2024 289  275 - 295 mOsm/kg Final    eGFR-Cr 05/22/2024 75  >=60 mL/min/1.73m2 Final    WBC 05/22/2024 4.3  4.0 - 11.0 x10(3) uL Final    RBC 05/22/2024 3.80  3.80 - 5.30 x10(6)uL Final    HGB 05/22/2024 11.2 (L)  12.0 - 16.0 g/dL Final    HCT 05/22/2024 34.7 (L)  35.0 - 48.0 % Final    MCV 05/22/2024 91.3  80.0 - 100.0 fL Final    MCH 05/22/2024 29.5  26.0 - 34.0 pg Final    MCHC 05/22/2024 32.3  31.0 - 37.0 g/dL Final    RDW-SD 05/22/2024 42.4  35.1 - 46.3 fL Final    RDW 05/22/2024 12.9  11.0 - 15.0 % Final    PLT 05/22/2024 126.0 (L)  150.0 - 450.0 10(3)uL Final    Immature Platelet Fraction 05/22/2024 3.4  0.0 - 7.0 % Final    Neutrophil Absolute Prelim 05/22/2024 2.35   1.50 - 7.70 x10 (3) uL Final    Neutrophil Absolute 05/22/2024 2.35  1.50 - 7.70 x10(3) uL Final    Lymphocyte Absolute 05/22/2024 1.28  1.00 - 4.00 x10(3) uL Final    Monocyte Absolute 05/22/2024 0.49  0.10 - 1.00 x10(3) uL Final    Eosinophil Absolute 05/22/2024 0.13  0.00 - 0.70 x10(3) uL Final    Basophil Absolute 05/22/2024 0.03  0.00 - 0.20 x10(3) uL Final    Immature Granulocyte Absolute 05/22/2024 0.01  0.00 - 1.00 x10(3) uL Final    Neutrophil % 05/22/2024 54.9  % Final    Lymphocyte % 05/22/2024 29.8  % Final    Monocyte % 05/22/2024 11.4  % Final    Eosinophil % 05/22/2024 3.0  % Final    Basophil % 05/22/2024 0.7  % Final    Immature Granulocyte % 05/22/2024 0.2  % Final   ]    PROCEDURE:    The risks and benefits of intraarticular corticosteroid injection were again explained to the patient and verbal consent was obtained. The Right shoulder was prepped and draped in the usual sterile fashion. A 25 G 1.5-inch needle was introduced into the Right subacromial space in a posterolateral approach while injecting 1.5 cc of 1% lidocain. Aspiration was attempted and there was no fluid able to be aspirated. We switched the syringe to one containing 4 mL of 1% lidocaine and 1 mL of 40 mg/mL Kenalog and it was injected.  The needle was then removed and hemostasis was obtained.  The skin site was cleansed and a clean dressing was applied.  The patient tolerated the procedure well.     Patient verbalized understanding of assessment and plan.  Patient is in agreement with the plan.  All questions were answered.  No barriers to learning identified.       INSTRUCTIONS GIVEN TO PATIENT:    \"You will see an effect in the next 2-3 days.  Please contact me if you have fevers, worsening swelling, worsening pain, decreased range of motion, increased redness, chills, or anything that makes you concerned about how the joint we injected feels/looks.  If you do not reach me in a reasonable time, please report directly to the  emergency room for further evaluation\"      Alex B. Behar MD, [facility]  Physical Medicine and Rehabilitation/Sports Medicine  [facility]

## 2024-11-18 NOTE — TELEPHONE ENCOUNTER
Recent Fills: 08/10/2024, 09/08/2024, 10/16/2024-quantity 60 for 30 day supply     Last Rx Written: 05/07/2024    Last Office Visit: 11/07/2024

## 2024-11-18 NOTE — TELEPHONE ENCOUNTER
Pharmacy requesting refill     pregabalin 100 MG Oral Cap Take 1 capsule (100 mg total) by mouth 2 (two) times daily. 60 capsule 11

## 2024-11-19 NOTE — PROGRESS NOTES
Diagnosis:  Myalgia (M79.10)  Subacromial bursitis of right shoulder joint (M75.51)  Tendinopathy of rotator cuff, unspecified laterality (M67.919)  Chronic right shoulder pain (M25.511,G89.29)  Chronic pain of both shoulders (M25.511,G89.29,M25.512)         Referring Provider:  Behar, A. MD Date of Evaluation:  11/14/2024    Precautions:  None Date POC Expires: 2/14/2025     Date of Surgery: NA    Next MD visit:   none scheduled     Today's Date: 11/19/2024    Insurance Primary/Secondary:  MEDICARE PART A AND B/ MEDICAID      Authorized Visits: 12     Visit Number: 2    Charges: TherEx x   Total Timed Treatment: 40 min  Total Treatment time: 41 min    Medication Changes since last visit?: No    Subjective:   Lizbeth report that she feels a little better in the (R) Shoulder.  She can lift/reach above her head now with less pain rated 0-4/10.  She has been doing her HEP ((R Shoulder repeated IR) about 3-4x/day.  She is complaining of lower and mid back pain at this time.    Objective:     ROM/MMT: (Pre-test symptom: 0/10 (R) Shoulder)  Shoulder    Flexion:  Nil loss; NE   Extension:  Minimal loss; NE     Abduction:  Minimal loss; NE   IR:  Moderate loss; P, NW    ER:  Nil loss; NE      Treatment:    Date: 11/19/2024  Tx#: 2/12 Date:   Tx#: 3/ Date:   Tx#: 4/ Date:   Tx#: 5/ Date:   Tx#: 6/ Date:   Tx#: 7/ Date:   Tx#: 8/   Scifit UE only L1 fwd/bkwd x 5 mins ea; NE postural training    (R) Shoulder repeated IR x 10 reps; P, NW     + self OP with opposite hand 2 x 10 reps; P, NW    - increased (R) shoulder abduction to nil loss     + towel OP x 10 reps; P, NW    (B) UE n.row, extension, w.row yellowTB 10 reps x 10 cts ea; NE    (R) UE OH wall reach (scaption) with towel 10 reps x 10 cts ea; NE    Sitting posture correction with lumbar roll    C/s retraction with self OP 2 x 10 reps; NE    Standing: (R) Shoulder IR with towel OP x 10 reps; P, NW           Assessment:   Lizbeth continue to respond to (R) Shoulder internal  rotation directional preference exercise.  She has improved/increased (R) Shoulder AROM toward flexion, abduction, and extension without pain/ache at endrange.  She still fell endrange pain ache toward IR (directional preference) but no worse as a result.  Instructed Lizbeth to progress her HEP with added towel OP but to lessen frequency to avoid irritation.        HEP:    - (R) Shoulder repeated internal rotation with towel OP x 10 reps 3x/day    Goals:   (12 visits)  1. Patient to consistently perform their HEP and it's progression to maintain their improved condition.  2. Patient to have reduced and abolished (R) Shoulder symptoms to be able to reach up and sideways, carry, lift, dress up, clean, and sleep on her sides without producing or increasing symptoms in the (R) Shoulder.    3. Patient to have WNL of (R) Shoulder AROM in all directions with 4/5 MMT in all motions to promote all home, work, recreational,and functional activities without discomfort or deviation.     Plan:   Re-assess next session and continue with (R) Shoulder directional preference exercise, posture correction, strengthening/stability exercises, patient education, and HEP progression.

## 2024-11-20 NOTE — ED QUICK NOTES
Orders for admission, patient is aware of plan and ready to go upstairs. Any questions, please call ED RN Nalini at extension [phone].     Patient Covid vaccination status: Fully vaccinated     COVID Test Ordered in ED: None    COVID Suspicion at Admission: N/A    Running Infusions:  None    Mental Status/LOC at time of transport: A&Ox4/4    Other pertinent information: Tuvaluan speaking   CIWA score: N/A   NIH score:  N/A

## 2024-11-20 NOTE — CONSULTS
Cardiology (consult dictated)    Assessment:  1.  Left-sided chest pain, pleuritic, atypical for ischemia.  Initial troponin normal.  EKG without acute changes.    2.  Moderate pericardial effusion seen on CT scan of the chest.  Patient may have pericarditis.    3.  History of primary brain cancer.  Treated with surgery and chemo therapy.    Plan:  1.  Echocardiogram.    2.  Trend troponins.  If serial troponins are negative, then we do not need to do an ischemic workup.    3.  Viral titers, inflammatory workup.      Thank you

## 2024-11-20 NOTE — CONSULTS
[facility]    PATIENT'S NAME: NIKHIL SIBLEY   ATTENDING PHYSICIAN: Alon Gonzales MD   CONSULTING PHYSICIAN: Jeremy Babcock MD   PATIENT ACCOUNT#:   [id number]    LOCATION:  [address]  MEDICAL RECORD #:   [id number]       YOB: 1945  ADMISSION DATE:       11/20/2024      CONSULT DATE:  11/20/2024    REPORT OF CONSULTATION      HISTORY OF PRESENT ILLNESS:  The patient is a pleasant 79-year-old Portuguese-speaking female who came to the emergency room today because of chest pain.  It originates under the left breast and radiates through to the back.  It is not there at rest, but comes on with any kind of activity or straining of the upper body.  There are no associated sweats, dizziness, or palpitations.  It has been coming off and on for the last 3 days.  There is no prior cardiac history.    PAST MEDICAL HISTORY:  Brain cancer, apparently a primary cancer there, which was initially attempted to be treated with surgical resection but that could not be accomplished and so she was treated with chemotherapy.  Hypertension, osteoporosis.      PAST SURGICAL HISTORY:  She has had minor surgery on her hands and also has had gastric surgery.     MEDICATIONS:  Home medications:  Lyrica 100 mg twice daily, famotidine 20, nifedipine 60 q.a.m., alendronate, cinacalcet, donepezil, lisinopril, sertraline.      ALLERGIES:  She reports allergies to latex.    SOCIAL HISTORY:  She is a  with 4 children.  She has never smoked cigarettes and drinks occasional alcohol.    PHYSICAL EXAMINATION:    GENERAL:  Well-developed, well-nourished female, no acute distress.  Alert and oriented x3.  VITAL SIGNS:  Blood pressure 130/60; respirations 14, unlabored; pulse 63, regular rhythm; afebrile; saturation 97% on room air.  HEENT:  Unremarkable.  NECK:  Supple.  Jugular venous pressure is mildly to moderately increased.  Carotids are brisk without bruits.  No thyromegaly.  LUNGS:  Clear.  HEART:   S1, S2 normal.  No gallop, murmur, rub, or click.  ABDOMEN:  Soft, nontender.  Bowel sounds present.  Benign.  EXTREMITIES:  Warm and dry.  Good pulses.  No edema.    LABORATORY DATA:  Troponin normal x1, and other chemistries are normal.    EKG shows sinus rhythm and is within normal limits.    CT of the chest reports a moderate-sized pericardial effusion, increased from previously, although previous study that this compares to cannot be located.  Study is negative for pulmonary embolism.    Chest x-ray is clear.      ASSESSMENT:    1.   Left-sided chest pain, mostly pleuritic and positional, atypical for ischemia.  No acute EKG abnormalities and initial troponin normal.  2.   Moderate pericardial effusion seen on CT.  Patient may have pericarditis.   3.   History of primary brain cancer.    PLAN:    1.   Echocardiogram.  2.   Trend troponins.  If serial troponins are negative, then an inpatient ischemic workup can be deferred.    3.   Viral titers and check inflammatory testing.    Thank you for this consultation.    Dictated By Jeremy Babcock MD  d: 11/20/2024 13:36:54  t: 11/20/2024 13:57:54  [facility] [id number]  [facility]/

## 2024-11-20 NOTE — H&P
[facility]    PATIENT'S NAME: NIKHIL SIBLEY   ATTENDING PHYSICIAN: Alon Gonzales MD   PATIENT ACCOUNT#:   [id number]    LOCATION:  [address]  MEDICAL RECORD #:   [id number]       YOB: 1945  ADMISSION DATE:       11/20/2024    HISTORY AND PHYSICAL EXAMINATION    CHIEF COMPLAINT:  Chest pain, pericardial effusion.    HISTORY OF PRESENT ILLNESS:  Patient is a 79-year-old  female who came into the emergency department for evaluation of intermittent to constant chest pain for the last 3 to 4 days.  CBC and chemistry were unremarkable, platelet count 141.  CT scan of the chest, rule out PE, showed no pulmonary embolism but showed moderate pericardial effusion.  Chest x-ray showed no acute findings.  Sedimentation rate 29, CRP still pending.  Patient was evaluated by Cardiology.  She had a bedside echocardiogram with Doppler which showed moderate pericardial effusion.  Patient will be admitted to the hospital for further management.     PAST MEDICAL HISTORY:  Generalized osteoarthritis, osteoporosis, degenerative joint disease of cervical and lumbar spine, depression, fibromyalgia, hypertension, hypothyroidism, gastroesophageal reflux disease.    PAST SURGICAL HISTORY:  Central nervous system meningeal non-Hodgkin lymphoma, status post right craniotomy and resection, followed by adjuvant chemotherapy with prolonged remission.  She has history of right total knee arthroplasty, left eye prosthetic, tubal ligation, hand surgery, hysterectomy, cholecystectomy.    MEDICATIONS:  Please see medication reconciliation list.     FAMILY HISTORY:  Positive for hypertension.    SOCIAL HISTORY:  No tobacco, alcohol, or drug use.  Independent for basic activities of daily living.     REVIEW OF SYSTEMS:  Almost constant chest pain for the last few days, increased with deep breathing and body position.  No recent fever, chills, or cough.  Other 12-point review of systems is negative.        PHYSICAL EXAMINATION:    GENERAL:  Alert and oriented to time, place and person.  No acute distress.   VITAL SIGNS:  Temperature 98.1, pulse 58, respiratory rate 14, blood pressure 140/58, pulse ox 97% on room air.  HEENT:  Atraumatic.  Oropharynx clear.  Dry mucous membranes.  Ears and nose normal.  Eyes:  Anicteric sclerae.   NECK:  Supple.  No lymphadenopathy.  Trachea midline.  Full range of motion.   LUNGS:  Clear to auscultation bilaterally.  Normal respiratory effort.    HEART:  Regular rate and rhythm.  S1 and S2 auscultated.  No murmur.    ABDOMEN:  Soft, nondistended.  No tenderness.  Positive bowel sounds.   EXTREMITIES:  No peripheral edema, clubbing or cyanosis.   NEUROLOGIC:  Motor and sensory intact.      ASSESSMENT:  Chest pain, pericardial effusion.  Rule out pericarditis.      PLAN:  Patient will be admitted to telemetry floor.  Continue to monitor closely.  Viral panel, rheumatoid arthritis and connective tissue panel are ordered.  Sedimentation rate is normal.  Cardiology consult.  Further recommendations to follow.     Dictated By Fermin Dawson MD  d: 11/20/2024 14:24:03  t: 11/20/2024 14:45:13  [facility] [id number]  /

## 2024-11-20 NOTE — ED PROVIDER NOTES
Patient Seen in: [facility] Emergency Department      History     Chief Complaint   Patient presents with    Chest Pain Angina     Stated Complaint: chest pain that radiates to the back    Subjective:   HPI    Please note that history and physical exam were performed with the assistance of a professional video language line service.    Patient presents the emergency department complaining of left-sided chest pain that radiates to her back.  She states that the pain has been continuous for 3 days although it does wax and wane in intensity.  It seems worse when she is twisting turning and moving.  There is no fever or chills.  There is no cough or cold symptoms.  There is no other aggravating or alleviating factors.    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 , PROSTHETIC EYE RT              Past Surgical History:   Procedure Laterality Date    Brain surgery  10/31/2016    Right frontal craniotomy for tumor resection    Cholecystectomy  2012    Colonoscopy      at [facility] about 8 years ago    Colonoscopy N/A 2/15/2023    Procedure: COLONOSCOPY;  Surgeon: Radha Sr MD;  Location: [facility] ENDOSCOPY    Hysterectomy      Knee surgery Right 10/2022    Other surgical history      Bionic Eye    Other surgical history      lymphoma biopsy    Other surgical history      3 RT hand surgeries for broken fingers     Tubal ligation                  Social History     Socioeconomic History    Marital status:  [Pathological] : TNM Stage: p    Tobacco Use    Smoking status: Never     Passive exposure: Never    Smokeless tobacco: Never   Vaping Use    Vaping status: Never Used   Substance and Sexual Activity    Alcohol use: Not Currently     Alcohol/week: 0.0 standard drinks of alcohol    Drug use: No   Other Topics Concern    Caffeine Concern No     Comment: Coffee 1 cup daily    Sleep Concern No    Exercise Yes   Social History Narrative    The patient does not use an assistive device..      The patient does live in a home with stairs.     Social Drivers of Health     Financial Resource Strain: Low Risk  (5/24/2024)    Financial Resource Strain     Difficulty of Paying Living Expenses: Not very hard   Food Insecurity: No Food Insecurity (5/20/2024)    Food Insecurity     Food Insecurity: Never true   Transportation Needs: No Transportation Needs (5/20/2024)    Transportation Needs     Lack of Transportation: No   Physical Activity: Insufficiently Active (5/28/2020)    [facility]    Exercise Vital Sign     Days of Exercise per Week: 1 day     Minutes of Exercise per Session: 30 min   Stress: Stress Concern Present (5/28/2020)    [facility], [facility]    [employer] - Occupational Stress Questionnaire     Feeling of Stress : To some extent   Social Connections: Moderately Integrated (5/28/2020)    Received from [facility], [facility]    Social Connection and Isolation Panel [NHANES]     Frequency of Communication with Friends and Family: More than three times a week     Frequency of Social Gatherings with Friends and Family: Once a week     Attends [employer] Services: More than 4 times per year     Active Member of Clubs or Organizations: Yes     Attends Club or Organization Meetings: Never     Marital Status:    Housing Stability: Low Risk   [IA] : IA (5/20/2024)    Housing Stability     Housing Instability: No                  Physical Exam     ED Triage Vitals   BP 11/20/24 0954 137/55   Pulse 11/20/24 0954 66   Resp 11/20/24 0954 18   Temp 11/20/24 0957 98.1 °F (36.7 °C)   Temp src 11/20/24 0957 Oral   SpO2 11/20/24 0954 97 %   O2 Device 11/20/24 0954 None (Room air)       Current Vitals:   Vital Signs  BP: 144/70  Pulse: 55  Resp: 16  Temp: 98.1 °F (36.7 °C)  Temp src: Oral  MAP (mmHg): 89    Oxygen Therapy  SpO2: 95 %  O2 Device: None (Room air)        Physical Exam  Vitals and nursing note reviewed.   Constitutional:       General: She is not in acute distress.     Appearance: She is well-developed.   HENT:      Head: Normocephalic.      Nose: Nose normal.      Mouth/Throat:      Mouth: Mucous membranes are moist.   Eyes:      Conjunctiva/sclera: Conjunctivae normal.   Cardiovascular:      Rate and Rhythm: Normal rate and regular rhythm.      Heart sounds: No murmur heard.  Pulmonary:      Effort: Pulmonary effort is normal. No respiratory distress.      Breath sounds: Normal breath sounds.   Abdominal:      General: There is no distension.      Palpations: Abdomen is soft.      Tenderness: There is no abdominal tenderness.   Musculoskeletal:         General: No tenderness. Normal range of motion.      Cervical back: Normal range of motion and neck supple.   Skin:     General: Skin is warm and dry.      Capillary Refill: Capillary refill takes less than 2 seconds.      Findings: No rash.   Neurological:      General: No focal deficit present.      Mental Status: She is alert and oriented to person, place, and time.      Cranial Nerves: No cranial nerve deficit.      Motor: No weakness.             ED Course     Labs Reviewed   CBC WITH DIFFERENTIAL WITH PLATELET - Abnormal; Notable for the following components:       Result Value    .0 (*)     All other components within normal limits   BASIC METABOLIC PANEL (8) - Abnormal; Notable for the following  [TTNM] : 1mi [NTNM] : 0 components:    Glucose 125 (*)     Calculated Osmolality 296 (*)     All other components within normal limits   TROPONIN I HIGH SENSITIVITY - Normal   SED RATE, WESTERGREN (AUTOMATED) - Normal   C-REACTIVE PROTEIN - Normal   SED RATE, WESTERGREN (AUTOMATED) - Normal   C-RP/HIGH SENSITIVITY - Normal   RHEUMATOID ARTHRITIS FACTOR - Normal   TROPONIN I HIGH SENSITIVITY - Normal   URINALYSIS, ROUTINE   CONNECTIVE TISSUE DISEASE (NATE) SCREEN, REFLEX SPECIFIC ANTIBODY   COXSACKIE A PROFILE   COXSACKIE VIRUS GROUP B AB   ECHOVIRUS ANTIBODY PANEL   RAINBOW DRAW LAVENDER   RAINBOW DRAW LIGHT GREEN   RAINBOW DRAW BLUE     EKG    Rate, intervals and axes as noted on EKG Report.  Rate: 66 bpm  Rhythm: Sinus Rhythm  Reading: Nonspecific ST abnormalities, unchanged from old EKG.                       [facility]              Admission disposition: 11/20/2024  1:44 PM           Medical Decision Making  Differential diagnosis considered for angina, PE, pericarditis.    Problems Addressed:  Chest pain, unspecified type: acute illness or injury  Pericardial effusion (HCC): acute illness or injury    Amount and/or Complexity of Data Reviewed  Labs: ordered. Decision-making details documented in ED Course.     Details: Labs unremarkable  Radiology: ordered and independent interpretation performed. Decision-making details documented in ED Course.     Details: Chest x-ray normal.  CT scan shows moderate pericardial effusion.  ECG/medicine tests: ordered and independent interpretation performed. Decision-making details documented in ED Course.  Discussion of management or test interpretation with external provider(s): Seen by cardiology in the emergency department.  Echocardiogram ordered.  Discussed with the hospitalist.  Will admit for observation.    Risk  Decision regarding hospitalization.        Disposition and Plan     Clinical Impression:  1. Chest pain, unspecified type    2. Pericardial effusion (HCC)          Disposition:  Admit  11/20/2024  1:44 pm    Follow-up:  No follow-up provider specified.  We recommend that you schedule follow up care with a primary care provider within the next three months to obtain basic health screening including reassessment of your blood pressure.      Medications Prescribed:  Current Discharge Medication List              Supplementary Documentation:         Hospital Problems       Present on Admission  Date Reviewed: 11/14/2024            ICD-10-CM Noted POA    * (Principal) Chest pain, unspecified type R07.9 11/20/2024 Unknown    Hyperglycemia R73.9 11/20/2024 Yes                                                         [MTNM] : x [de-identified] : 6533 [de-identified] : [id number] [de-identified] : Right Breast

## 2024-11-20 NOTE — ED INITIAL ASSESSMENT (HPI)
Pt to ED A&O x 4. Pt primarily Marshallese speaking, language line , Sheldon ID # [id number] utilized for triage.  Pt w/ c/o continuous chest pain located under L breast radiating around to L upper back.  Pain worse w/ activity.  Pt in NAD, speaking full, complete sentences w/ out any apparent difficulty/distress.

## 2024-11-21 NOTE — PROGRESS NOTES
Progress Note  Lizbeth Matute Patient Status:  Observation    1945 MRN [id number]   Location [facility] 3W/SW Attending Fermin Dawson MD   Hosp Day # 0 PCP Kimmy Talbot MD     Subjective:  She is resting comfortably in bed. She has some ongoing chest discomfort which is worsened by positional movements, palpation of substernal chest region, and deep inspiration. Denies shortness of breath.   Daughter at bedside used for translation.    Objective:  /57 (BP Location: Right arm)   Pulse 62   Temp 98.6 °F (37 °C) (Oral)   Resp 18   Wt 123 lb 9.6 oz (56.1 kg)   SpO2 95%   BMI 24.14 kg/m²     Intake/Output:    Intake/Output Summary (Last 24 hours) at 2024 0751  Last data filed at 2024 1820  Gross per 24 hour   Intake 240 ml   Output --   Net 240 ml       Last 3 Weights   24 0436 123 lb 9.6 oz (56.1 kg)   24 0954 126 lb 1.7 oz (57.2 kg)   24 0853 126 lb (57.2 kg)   24 1638 126 lb 12.8 oz (57.5 kg)       Labs:  Recent Labs   Lab 24  0944 24  1008 24  0612   GLU 91 125* 82   BUN 18 19 22   CREATSERUM 0.87 0.96 0.90   EGFRCR 68 61 65   CA 10.3 9.9 10.2    141 140   K 4.3 3.6 4.4    108 110   CO2 27.0 25.0 27.0     Recent Labs   Lab 24  0944 24  1008 24  0612   RBC 3.93 3.93 4.02   HGB 12.2 12.4 12.0   HCT 36.2 36.9 36.9   MCV 92.1 93.9 91.8   MCH 31.0 31.6 29.9   MCHC 33.7 33.6 32.5   RDW 13.0 13.2 13.2   NEPRELIM 3.07 3.38 2.52   WBC 4.4 5.1 4.5   .0* 141.0* 139.0*         Recent Labs   Lab 24  1008 24  1355   TROPHS <3 4       Diagnostics:   Telemetry: NSR    EKG  NSR, no acute ST/T changes    TTE   1. Left ventricle: The cavity size was normal. Wall thickness was normal.      Systolic function was normal. The estimated ejection fraction was 60-65%,      by biplane method of disks. Doppler parameters are consistent with      abnormal left ventricular relaxation - grade  1 diastolic dysfunction.   2. Pulmonary arteries: Systolic pressure was within the normal range.   3. Pericardium, extracardiac: A small pericardial effusion was identified      along the left ventricular free wall. There was no evidence of      hemodynamic compromise.   4. Inferior vena cava: The IVC was normal-sized. Respirophasic diameter      changes are in the normal range (> 50%), consistent with normal central      venous pressure.     CT of the chest reports a moderate-sized pericardial effusion, increased from previously, although previous study that this compares to cannot be located. Study is negative for pulmonary embolism.     Review of Systems   Cardiovascular:  Positive for chest pain.   Respiratory:  Negative for shortness of breath.        Physical Exam:  General: Alert and oriented in no apparent distress.  HEENT: Pupils equal. Mucous membranes moist.   Neck: No JVD  Cardiac:  Normal S1 S2, Regular. No murmur. Pain elicited by palpation of substernal chest wall region  Lungs: Clear without wheezes or crackles    Abdomen: Soft, non-tender, ND  Extremities: Without clubbing, cyanosis or edema.    Neurologic: No focal deficits. Normal affect.  Skin: Warm and dry,    Medications:   heparin  5,000 Units Subcutaneous 2 times per day    cinacalcet  15 mg Oral Daily    donepezil  10 mg Oral Nightly    famotidine  20 mg Oral Daily    lisinopril  20 mg Oral Daily    pregabalin  100 mg Oral BID    sertraline  100 mg Oral Nightly         Assessment:    Left-sided chest pain, suspect non-cardiac  Pleuritic and positional symptoms. Pain elicited by palpation of substernal chest wall.   HsTrop x 2 negative. EKG with NSR without ST/T changes. TTE with preserved LVEF 60-65%, small pericardial effusion. Not consistent with ACS.   CTA chest without PE  Inflammatory markers CRP, ESR, Rheum factor are within normal limits  Small pericardial effusion   Clinically not in tamponade and echo without tamponade physiology.  Prior TTE in 2016 with similar findings. Inflammatory, autoimmune markers negative. Recent URI symptoms.  History of primary brain cancer  Fibromyalgia - alternative etiology for symptoms  HTN - controlled    Plan:    Chest pain is likely musculoskeletal in nature. Reassuring cardiac workup here. Incidental finding of small pericardial effusion with negative inflammatory/autoimmune markers, similar finding on prior echocardiogram 2016.  Reviewed case with Dr Simpson. Stable for discharge this afternoon from cardiac standpoint    Plan of care discussed with patient, RN.    DYLAN Auguste  11/21/2024  7:51 AM      L3  Seen and examined bedside. Agree with the present management, will sign off.  Chest pain is musculoskeletal.  Chest CT moderate pericardial effusion echocardiogram shows only small size.  Pulmonary embolism ruled out with CTA  Nothing further from cardiac standpoint  Thank you for allowing me to participate in the care of your patient, feel free to contact me if you have any questions.    Tyler Simpson MD  [facility]  Interventional Cardiology.  [phone]

## 2024-11-21 NOTE — CM/SW NOTE
11/21/24 1500   Discharge disposition   Expected discharge disposition Home or Self   Discharge transportation Private car     Angela Lamb MBA BSN RN CRRN   RN Case Manager  [phone]

## 2024-11-21 NOTE — CM/SW NOTE
Patient failed inpatient criteria. Second level of review completed and supports observation.  UR committee in agreement. Discussed with Dr. Sun  who approves observation status.  MOON  notice explained  to the daughter and patient . Patient deferred signing to her daughter . Signed copy placed in chart  Order for observation in place.    Hevaen ISLAS, Utilization Review   Ext [phone]

## 2024-11-21 NOTE — PLAN OF CARE
Problem: Patient Centered Care  Goal: Patient preferences are identified and integrated in the patient's plan of care  Description: Interventions:  - What would you like us to know as we care for you? Luxem[place]ish speaking  - Provide timely, complete, and accurate information to patient/family  - Incorporate patient and family knowledge, values, beliefs, and cultural backgrounds into the planning and delivery of care  - Encourage patient/family to participate in care and decision-making at the level they choose  - Honor patient and family perspectives and choices  Outcome: Adequate for Discharge     Problem: Patient/Family Goals  Goal: Patient/Family Long Term Goal  Description: Patient's Long Term Goal: Go home    Interventions:  - ct chest  -echo  - See additional Care Plan goals for specific interventions  Outcome: Adequate for Discharge  Goal: Patient/Family Short Term Goal  Description: Patient's Short Term Goal: pain control    Interventions:   - tylenol prn  - See additional Care Plan goals for specific interventions  Outcome: Adequate for Discharge     Reviewed discharge instructions with patient and daughter. Instructed to follow up with pcp in 1 week. IV removed.

## 2024-11-21 NOTE — PLAN OF CARE
Pt A/Ox4, Stand-by assist. Plan for Cards eval in AM.     Problem: Patient Centered Care  Goal: Patient preferences are identified and integrated in the patient's plan of care  Description: Interventions:  - What would you like us to know as we care for you? I would like to return home.   - Provide timely, complete, and accurate information to patient/family  - Incorporate patient and family knowledge, values, beliefs, and cultural backgrounds into the planning and delivery of care  - Encourage patient/family to participate in care and decision-making at the level they choose  - Honor patient and family perspectives and choices  Outcome: Progressing     Problem: Patient/Family Goals  Goal: Patient/Family Long Term Goal  Description: Patient's Long Term Goal: Get stronger.     Interventions:  - Ambulate often.   - See additional Care Plan goals for specific interventions  Outcome: Progressing  Goal: Patient/Family Short Term Goal  Description: Patient's Short Term Goal: Ask frequent questions.     Interventions:   - Work with staff on ways to improve overall health status.   - See additional Care Plan goals for specific interventions  Outcome: Progressing

## 2024-11-22 NOTE — PROGRESS NOTES
Diagnosis:  Myalgia (M79.10)  Subacromial bursitis of right shoulder joint (M75.51)  Tendinopathy of rotator cuff, unspecified laterality (M67.919)  Chronic right shoulder pain (M25.511,G89.29)  Chronic pain of both shoulders (M25.511,G89.29,M25.512)         Referring Provider:  Behar, A. MD Date of Evaluation:  11/14/2024    Precautions:  None Date POC Expires: 2/14/2025     Date of Surgery: NA    Next MD visit:   none scheduled     Today's Date: 11/22/2024    Insurance Primary/Secondary:  MEDICARE PART A AND B/ MEDICAID      Authorized Visits: 12     Visit Number: 3    Charges: TherEx x 3  Total Timed Treatment: 44 min  Total Treatment time: 45 min    Medication Changes since last visit?: No    Subjective:   Lizbeth continues to report feeling pain in her (R) shoulder with most functional activities such as reaching or when raising her arm above shoulder level.  She has been doing her HEP ((R) Shoulder repeated IR with towel OP) about 3-4x/day.  She continues to complain of mid/lower back pain and was instructed to reach out to her doctor for an order to treat her back and assist with symptom alleviation.    Objective:     ROM/MMT: (Pre-test symptom: 6/10 (R) Shoulder)  Shoulder    Flexion:  Nil loss; Inc, NW (PDM)   Extension:  Minimal loss; NE     Abduction:  Minimal loss; Inc, NW 3/10 (ERP)  IR:  Moderate loss; Inc, NW  4/10 (ERP)  ER:  Nil loss; NE      Treatment:    Date: 11/19/2024  Tx#: 2/12 Date: 11/22/2024  Tx#: 3/12 Date:   Tx#: 4/ Date:   Tx#: 5/ Date:   Tx#: 6/   Scifit UE only L1 fwd/bkwd x 5 mins ea; NE postural training    (R) Shoulder repeated IR x 10 reps; P, NW     + self OP with opposite hand 2 x 10 reps; P, NW    - increased (R) shoulder abduction to nil loss     + towel OP x 10 reps; P, NW    (B) UE n.row, extension, w.row yellowTB 10 reps x 10 cts ea; NE    (R) UE OH wall reach (scaption) with towel 10 reps x 10 cts ea; NE    Sitting posture correction with lumbar roll    C/s retraction with  self OP 2 x 10 reps; NE    Standing: (R) Shoulder IR with towel OP x 10 reps; P, NW Scifit UE only L1 fwd/bkwd x 5 mins ea; NE postural training    (R) Shoulder repeated IR with towel OP x 10 reps; Inc, NW (corrected technique)    - retest: symptoms reduced in all planes    (R) Shoulder eccentric loading into IR yellowTB. 10 reps x 10 eccen cts; Inc, NW (req'd cueing)    (B) UE n.row, extension, w.row Red TB 10 reps x 10 cts ea; NE tired    Standing: (R) Shoulder IR with towel OP x 10 reps; Inc, NW (corrected technique)    - retest: Increased ROM into abduction to Nil loss        Assessment:   Lizbeth continues to respond to (R) Shoulder internal rotation DP exercise.  She demonstrated an overall decrease of symptoms in all planes of motion during retest, but also exhibited improvements in AROM during abduction to Nil loss.  She required cueing to improve technique during her DP exercise and was instructed on the importance of performing her exercises regularly with proper tehnique in order to yield results and improve her condition.  She verbalized understanding/agreement and was told to continue with her HEP on a regular basis. All questions answered at this time.    HEP:    - (R) Shoulder repeated internal rotation with towel OP x 10 reps 3x/day      Goals:   (12 visits)  1. Patient to consistently perform their HEP and it's progression to maintain their improved condition.  2. Patient to have reduced and abolished (R) Shoulder symptoms to be able to reach up and sideways, carry, lift, dress up, clean, and sleep on her sides without producing or increasing symptoms in the (R) Shoulder.    3. Patient to have WNL of (R) Shoulder AROM in all directions with 4/5 MMT in all motions to promote all home, work, recreational,and functional activities without discomfort or deviation.       Plan:   Re-assess next session and continue with (R) Shoulder directional preference exercise, posture correction,  strengthening/stability exercises, patient education, and HEP progression.     On this date patient was seen by Stephen Prieto PTA under the supervision of Saji Rollins PT. Cert MDT.

## 2024-11-25 NOTE — PROGRESS NOTES
Transitions of Care Navigation  Discharge Date: 24  Contact Date: 2024    Transitions of Care Assessment:  FER Initial Assessment    General:  Assessment completed with: Children  Patient Subjective: Spoke with daughter.  She reports patient feels the same since being discharged from the hospital.  She is still experiencing the chest pain.  It has not worsened.  Daughter denies patient has any of the following symptoms:  fever, headache, dizziness, shortness of breath, abdominal pain, nausea or vomiting. Daughter advised if patient's chest pain worsens or she becomes short of breath to return to emergency room. Medications reviewed with daughter and she was instructed that patient was advised to stop taking zinc.  Appointment with nurse practitioner @ primary care physician scheduled for .  Daughter would like help coordinating appointment with cardiology. Outreach encounter sent to TST team.  Daughter aware that team will be reaching out to her to help with appointment coordination. Daugther did not have any additional questions or concerns.  Chief Complaint: Left-sided chest pain  Small pericardial effusion  Verify patient name and  with patient/ caregiver: Yes    Hospital Stay/Discharge:  Tell me what you understand of why you were in the hospital or emergency department: My mom was having chest pain  Prior to leaving the hospital were your Discharge Instructions reviewed with you?: Yes (With other daughter)  Did you receive a copy of your written Discharge Instructions?: Yes (patient did not but daughter who picked her up from hospital may have.  Maddy was informed how to access on line via [employer] L[employer].)  What questions do you have about your Discharge Instructions?: Daughter did not have any additional questions.  Do you feel better or worse since you left the hospital or emergency department?: Same    Follow - Up Appointment:  Do you have a follow-up appointment?: No  Are there any barriers  to getting to your follow-up appointment?: No    Home Health/DME:  Prior to leaving the hospital was Home Health (HH) arranged for you?: N/A  Are HH needs identified by staff during the assessment?: No     Prior to leaving the hospital or emergency department was Durable Medical Equipment (DME), medical supplies, or infusions arranged for you?: N/A  Are DME/medical supply/infusions needs identified by staff during this assessment?: No     Medications/Diet:  Did any of your medications change, during or after your hospital stay or ED visit?: No  Do you understand what your medications are for and possible side effects?: Yes  Are there any reasons that keep you from taking your medication as prescribed?: No  Any concerns about medication refills?: No    Were you given a different diet per your Discharge Instructions?: No  Reason: n/a     Questions/Concerns:  Do you have any questions or concerns that have not been discussed?: No     Nursing Interventions:  Assessed pain and for other signs/symptoms.  Reviewed medications. Instructed when to return to emergency room. scheduled appointment with primary care physician and/or specialist.       Medications:  Reviewed medications with patient.  Also, discussed new medication and potential side effects.  Patient did not have any additional questions.   Current Outpatient Medications   Medication Sig Dispense Refill    pregabalin 100 MG Oral Cap Take 1 capsule (100 mg total) by mouth 2 (two) times daily. 60 capsule 2    diclofenac 75 MG Oral Tab EC Take 1 tablet (75 mg total) by mouth 2 (two) times daily as needed (con comida). 120 tablet 1    famotidine 20 MG Oral Tab Take 1 tablet (20 mg total) by mouth 2 (two) times daily. 90 tablet 3    CALCIUM-VITAMIN D OR Take 1 tablet by mouth daily.      Multiple Vitamins-Minerals (MULTIPLE VITAMINS/WOMENS) Oral Tab Take 1 tablet by mouth daily.      Cholecalciferol (VITAMIN D3) 25 MCG (1000 UT) Oral Cap Take 1 tablet by mouth daily.       diclofenac 0.1 % Ophthalmic Solution Place 1 drop into the left eye daily as needed.      Fexofenadine HCl (ALLERGY 24-HR OR) Take 1 tablet by mouth daily.      diclofenac 1 % External Gel Apply 2 g topically 4 (four) times daily. (Patient taking differently: Apply 2 g topically 4 (four) times daily. To right knee) 150 g 2    alendronate 70 MG Oral Tab Take 1 tablet (70 mg total) by mouth every 7 days. 12 tablet 3    cinacalcet 30 MG Oral Tab Take 0.5 tablets (15 mg total) by mouth daily. 45 tablet 3    donepezil 10 MG Oral Tab Take 1 tablet (10 mg total) by mouth every evening. 90 tablet 3    lisinopril 20 MG Oral Tab Take 1 tablet (20 mg total) by mouth daily. 90 tablet 3    sertraline 100 MG Oral Tab Take 1 tablet (100 mg total) by mouth nightly. 90 tablet 3     Follow-up Appointments:  Your appointments       Date & Time Appointment Department ([place])    Nov 26, 2024 10:00 AM CST Physical Therapy Ortho Treatment with Saji Rollins [facility])        Nov 27, 2024 2:40 PM CST Hospital Follow Up with Saundra Oconnell APRN [facility] ([facility])        Dec 02, 2024 9:15 AM CST Physical Therapy Ortho Treatment with Stephen Prieto PTA [facility] in [facility])        Dec 06, 2024 11:30 AM CST Physical Therapy Ortho Treatment with Stephen Prieto PTA [facility])        Dec 09, 2024 9:15 AM CST Physical Therapy Ortho Treatment with Stephen Prieto PTA [facility])        Dec 12, 2024 8:30 AM CST Physical Therapy Ortho Treatment with Saji Rollins [facility])        Dec 18, 2024 10:00 AM CST Physical Therapy Ortho Treatment with Saji Rollins [facility] [place] ([facility])        Dec 20, 2024 9:15 AM CST [facility] [place] [facility]              [facility]  [address] [address]  [phone] [facility]  [address]  [place] [address] [phone]            Transitional Care Clinic  Was TCC Ordered: No    Primary Care Provider (If no TCC appointment)  Does patient already have a PCP appointment scheduled? No  Nurse Care Manager Scheduled PCP office HFU appointment with patient       Specialist  Does the patient have any other follow-up appointment(s) need to be scheduled? Yes   -If yes: Nurse Care Manager reviewed upcoming specialist appointments with patient: No   -Does the patient need assistance scheduling appointment(s): Yes, message to TST team    [x]  Patient's daughter verbally agrees to additional follow-up calls from Nurse Care Manager    Book By Date: 11/28/24

## 2024-11-25 NOTE — TELEPHONE ENCOUNTER
Please review. This medication has no protocol attached.    Future Appointments   Date Time Provider Department Center   11/27/2024  2:40 PM Saundra Oconnell APRN [facility]     Last office visit: 11/7/2024 (PHILL Kline)    Requested Prescriptions   Pending Prescriptions Disp Refills    CINACALCET 30 MG Oral Tab [Pharmacy Med Name: CINACALCET 30MG TABLETS] 45 tablet 3     Sig: TAKE 1/2 TABLET(15 MG) BY MOUTH DAILY       There is no refill protocol information for this order          Future Appointments         Provider Department Appt Notes    Tomorrow Saji Rollins [facility] in [place] Medicare/Medicaid    In 2 days Saundra Oconnell APRN [facility]    In 1 week Stephen Prieto PTA [facility] in [place] Medicare/Medicaid    In 1 week Stephen Prieto PTA [facility] [place] Medicare/Medicaid    In 2 weeks Stephen Prieto PTA [facility] [place] Medicare/Medicaid    In 2 weeks Saji Rollins [facility] [place] Medicare/Medicaid    In 3 weeks Saji Rollins [facility] [place] Medicare/Medicaid    In 3 weeks Saji Rollins [facility] [place] Medicare/Medicaid          Recent Outpatient Visits              3 days ago     [facility] [place] Stephen Prieto PTA    Office Visit    6 days ago     [facility] [place] Saji Rollins    Office Visit    1 week ago Myalgia    [facility] [place] Saji Rollins    Office Visit    1 week ago Myalgia    [facility] Behar, Alex, MD    Office Visit    2 weeks ago [facility]    Office Visit

## 2024-11-25 NOTE — PROGRESS NOTES
Transitions of Care team has contacted patient and patient needs additional appointments.  Please contact patient's daughter Maddy @ [phone] for assistance with scheduling a follow-up appointment for Cardiology.  Thank you!    Follow up with Jeremy Babcock MD (Interventional, Cardiology); as directed

## 2024-11-26 NOTE — PROGRESS NOTES
Diagnosis:  Myalgia (M79.10)  Subacromial bursitis of right shoulder joint (M75.51)  Tendinopathy of rotator cuff, unspecified laterality (M67.919)  Chronic right shoulder pain (M25.511,G89.29)  Chronic pain of both shoulders (M25.511,G89.29,M25.512)         Referring Provider:  Behar, A. MD Date of Evaluation:  11/14/2024    Precautions:  None Date POC Expires: 2/14/2025     Date of Surgery: NA    Next MD visit:   none scheduled     Today's Date: 11/26/2024    Insurance Primary/Secondary:  MEDICARE PART A AND B/ MEDICAID      Authorized Visits: 12     Visit Number: 4    Charges: TherEx x 3  Total Timed Treatment: 46 min  Total Treatment time: 48 min    Medication Changes since last visit?: No    Subjective:   Lizbeth state that she has pain at the back of her (R) Shoulder specially at night about 3-4 am.  She sleeps on he sides and uses 1 pillow by her head.  She has been doing her (R) Shoulder HEP (repeated IR with self OP) about 2-3x/day.   Objective:   Stooped forward, head down posture.     ROM/MMT: (Pre-test symptom: 0/10 (R) Shoulder)  Shoulder    Flexion:  Nil loss; NE  Extension:  Minimal loss; NE     Abduction: Nil loss; NE   IR:  Minimal loss; P, NW  3/10 (ERP)  ER:  Nil loss; NE      Treatment:    Date: 11/19/2024  Tx#: 2/12 Date: 11/22/2024  Tx#: 3/12 Date: 11/26/2024  Tx#: 4/12 Date:   Tx#: 5/ Date:   Tx#: 6/   Scifit UE only L1 fwd/bkwd x 5 mins ea; NE postural training    (R) Shoulder repeated IR x 10 reps; P, NW     + self OP with opposite hand 2 x 10 reps; P, NW    - increased (R) shoulder abduction to nil loss     + towel OP x 10 reps; P, NW    (B) UE n.row, extension, w.row yellowTB 10 reps x 10 cts ea; NE    (R) UE OH wall reach (scaption) with towel 10 reps x 10 cts ea; NE    Sitting posture correction with lumbar roll    C/s retraction with self OP 2 x 10 reps; NE    Standing: (R) Shoulder IR with towel OP x 10 reps; P, NW Scifit UE only L1 fwd/bkwd x 5 mins ea; NE postural training    (R)  Shoulder repeated IR with towel OP x 10 reps; Inc, NW (corrected technique)    - retest: symptoms reduced in all planes    (R) Shoulder eccentric loading into IR yellowTB. 10 reps x 10 eccen cts; Inc, NW (req'd cueing)    (B) UE n.row, extension, w.row Red TB 10 reps x 10 cts ea; NE tired    Standing: (R) Shoulder IR with towel OP x 10 reps; Inc, NW (corrected technique)    - retest: Increased ROM into abduction to Nil loss Scifit UE only L1 fwd/bkwd x 5 mins ea; NE postural training    Sitting posture correction with lumbar roll and 2 inch step    C/s retraction x 5 reps; NE    + self OP 2 x 10 reps; NE    (R) Shoulder repeated IR with self OP using opposite hand 2 x 10 reps; P, NW (better)    (R) Shoulder IR eccentric load with redTB 10 reps x 10 eccen ct ea; P, NW    (B) UE redTB n.row, extension, w.row 10 reps x 10 cts ea; NE           Assessment:   Lizbeth continue to respond to (R) Shoulder IR directional preference exercise but was explained the need to apply overpressure consistently.  Using her opposite arm is easier for patient to use as overpressure instead of the towel.  Added neck retraction and sitting posture correction to augment her (R) shoulder treatment.  All questions answered at this time.    HEP:    - (R) Shoulder repeated internal rotation with towel OP x 10 reps 3x/day  - Sitting posture correction with lumbar roll  - Seated: c/s retraction with self OP x 10 reps 2-3x/day    Goals:   (12 visits)  1. Patient to consistently perform their HEP and it's progression to maintain their improved condition.  2. Patient to have reduced and abolished (R) Shoulder symptoms to be able to reach up and sideways, carry, lift, dress up, clean, and sleep on her sides without producing or increasing symptoms in the (R) Shoulder.    3. Patient to have WNL of (R) Shoulder AROM in all directions with 4/5 MMT in all motions to promote all home, work, recreational,and functional activities without discomfort or  deviation.     Plan:   Re-assess next session and continue with (R) Shoulder directional preference exercise, posture correction, strengthening/stability exercises, patient education, and HEP progression.

## 2024-12-02 NOTE — PROGRESS NOTES
Left message on mailbox for patient to call nurse care manager back for transitions of care call.  Nurse care  information included in message.

## 2024-12-02 NOTE — PROGRESS NOTES
Diagnosis:  Myalgia (M79.10)  Subacromial bursitis of right shoulder joint (M75.51)  Tendinopathy of rotator cuff, unspecified laterality (M67.919)  Chronic right shoulder pain (M25.511,G89.29)  Chronic pain of both shoulders (M25.511,G89.29,M25.512)         Referring Provider:  Behar, A. MD Date of Evaluation:  11/14/2024    Precautions:  None Date POC Expires: 2/14/2025     Date of Surgery: NA    Next MD visit:   none scheduled     Today's Date: 12/2/2024    Insurance Primary/Secondary:  MEDICARE PART A AND B/ MEDICAID      Authorized Visits: 12     Visit Number: 5    Charges: TherEx x 3  Total Timed Treatment: 44 min  Total Treatment time: 45 min    Medication Changes since last visit?: No    Subjective:   Lizbeth continues to report experiencing pain in the (R) shoulder, more specifically in the anterior, posterior, and inferior aspects of the affected joint.  Her shoulder symptoms are exacerbated at night when sleeping on her (R) side - she was advised to sleep on her (L) side to reduce pressure through affected joint but pt verbalized that her (R) shoulder hurts \"even more\" when sleeping on her (L) side. She has been doing her (R) Shoulder HEP (repeated IR with self OP) about 2-3x/day.     Objective:     ROM/MMT: (Pre-test symptom: 4-6/10 (R) Shoulder)  Shoulder    Flexion:  Nil loss; Inc, NW (ERP 5/10)  Extension:  Nil loss; Inc, NW (ERP 5/10)    Abduction: Nil loss; Inc, NW (ERP 7/10)  IR:  Minimal loss; P, NW  3/10 (ERP)  ER:  Nil loss; NE      Treatment:    Date: 11/19/2024  Tx#: 2/12 Date: 11/22/2024  Tx#: 3/12 Date: 11/26/2024  Tx#: 4/12 Date: 12/2/2024  Tx#: 5/12 Date:   Tx#: 6/   Scifit UE only L1 fwd/bkwd x 5 mins ea; NE postural training    (R) Shoulder repeated IR x 10 reps; P, NW     + self OP with opposite hand 2 x 10 reps; P, NW    - increased (R) shoulder abduction to nil loss     + towel OP x 10 reps; P, NW    (B) UE n.row, extension, w.row yellowTB 10 reps x 10 cts ea; NE    (R) UE OH wall reach  (scaption) with towel 10 reps x 10 cts ea; NE    Sitting posture correction with lumbar roll    C/s retraction with self OP 2 x 10 reps; NE    Standing: (R) Shoulder IR with towel OP x 10 reps; P, NW Scifit UE only L1 fwd/bkwd x 5 mins ea; NE postural training    (R) Shoulder repeated IR with towel OP x 10 reps; Inc, NW (corrected technique)    - retest: symptoms reduced in all planes    (R) Shoulder eccentric loading into IR yellowTB. 10 reps x 10 eccen cts; Inc, NW (req'd cueing)    (B) UE n.row, extension, w.row Red TB 10 reps x 10 cts ea; NE tired    Standing: (R) Shoulder IR with towel OP x 10 reps; Inc, NW (corrected technique)    - retest: Increased ROM into abduction to Nil loss Scifit UE only L1 fwd/bkwd x 5 mins ea; NE postural training    Sitting posture correction with lumbar roll and 2 inch step    C/s retraction x 5 reps; NE    + self OP 2 x 10 reps; NE    (R) Shoulder repeated IR with self OP using opposite hand 2 x 10 reps; P, NW (better)    (R) Shoulder IR eccentric load with redTB 10 reps x 10 eccen ct ea; P, NW    (B) UE redTB n.row, extension, w.row 10 reps x 10 cts ea; NE     Scifit UE only L1 Fwd/Bkwd x 5 mins ea; Inc, NW (R shoulder pain when going fwd)    C/s retraction with self OP x 10 reps; NE    (R) Shoulder repeated IR with self OP x 10 reps; Inc, NW (cueing req'd to correct form)    (R) Shoulder IR eccentric load with Red-yellowTB x 10 reps; Inc, NW    Supine: (R) shoulder flexion (long arc) x 10 reps; Inc, NW    SLY: (R) shoulder abduction (long arc) x 10 reps; Inc, NW (less than flexion)    SLY: (R) shoulder ER with elbow at 90 deg x 20 reps; NE    Prone: (R) N. Row, Ext. 10 reps x 5 cts; Inc, NW    (R) Shoulder repeated IR with self OP x 10 reps; Inc, NW    - retest: no change        Assessment:   Lizbeth continues to be responding to her (R) shoulder DP exercise into internal rotation with self OP.  She maintains WNL most planes of shoulder motion except for IR which she is moderately  limited in.  She was introduced to parascapular strengthening in order to improve glenohumeral rhythm and strength.  She was advised to continue with her current HEP and educated on the importance of implementing her exercises often to improve her condition.  All questions answered at this time.    HEP:    - (R) Shoulder repeated internal rotation with towel OP x 10 reps 3x/day  - Sitting posture correction with lumbar roll  - Seated: c/s retraction with self OP x 10 reps 2-3x/day    Goals:   (12 visits)  1. Patient to consistently perform their HEP and it's progression to maintain their improved condition.  2. Patient to have reduced and abolished (R) Shoulder symptoms to be able to reach up and sideways, carry, lift, dress up, clean, and sleep on her sides without producing or increasing symptoms in the (R) Shoulder.    3. Patient to have WNL of (R) Shoulder AROM in all directions with 4/5 MMT in all motions to promote all home, work, recreational,and functional activities without discomfort or deviation.     Plan:   Re-assess next session and continue with (R) Shoulder directional preference exercise, posture correction, strengthening/stability exercises, patient education, and HEP progression.     On this date patient was seen by Stephen Prieto PTA under the supervision of Saji Rollins PT. Cert MDT.

## 2024-12-03 NOTE — PROGRESS NOTES
Second attempt -  Left message on mailbox for patient to call nurse care manager back for transitions of care call.  Nurse care  information included in message.

## 2024-12-05 NOTE — PROGRESS NOTES
Attempted to contact patient for 7 day follow up call.  No return call from patient after two outreaches.  Program ended.  Primary care physician updated.

## 2024-12-12 NOTE — PROGRESS NOTES
Diagnosis:  Myalgia (M79.10)  Subacromial bursitis of right shoulder joint (M75.51)  Tendinopathy of rotator cuff, unspecified laterality (M67.919)  Chronic right shoulder pain (M25.511,G89.29)  Chronic pain of both shoulders (M25.511,G89.29,M25.512)         Referring Provider:  Behar, A. MD Date of Evaluation:  11/14/2024    Precautions:  None Date POC Expires: 2/14/2025     Date of Surgery: NA    Next MD visit:   none scheduled     Today's Date: 12/12/2024    Insurance Primary/Secondary:  MEDICARE PART A AND B/ MEDICAID      Authorized Visits: 12     Visit Number: 6    Charges: TherEx x 3  Total Timed Treatment: 46 min  Total Treatment time: 48 min    Medication Changes since last visit?: No    Subjective:   Lizbeth still feel pain in the (R) shoulder when reaching up her back.  She has been doing her HEP regularly at home.  She has a lot of pain in the (R) shoulder when she lay down on it at night.  Objective:     ROM/MMT: (Pre-test symptom: 0/10 (R) Shoulder)  Shoulder    Flexion:  Nil loss; P, NW (ERP 5/10)  Extension:  Moderate loss; P, NW (ERP 5/10)    Abduction: Minimal loss; P, NW (ERP 5/10)  IR:  Moderate loss; P, NW  3/10 (ERP)  ER:  Nil loss; NE      Treatment:    Date: 11/19/2024  Tx#: 2/12 Date: 11/22/2024  Tx#: 3/12 Date: 11/26/2024  Tx#: 4/12 Date: 12/2/2024  Tx#: 5/12 Date: 12/12/2024  Tx#: 6/12   Scifit UE only L1 fwd/bkwd x 5 mins ea; NE postural training    (R) Shoulder repeated IR x 10 reps; P, NW     + self OP with opposite hand 2 x 10 reps; P, NW    - increased (R) shoulder abduction to nil loss     + towel OP x 10 reps; P, NW    (B) UE n.row, extension, w.row yellowTB 10 reps x 10 cts ea; NE    (R) UE OH wall reach (scaption) with towel 10 reps x 10 cts ea; NE    Sitting posture correction with lumbar roll    C/s retraction with self OP 2 x 10 reps; NE    Standing: (R) Shoulder IR with towel OP x 10 reps; P, NW Scifit UE only L1 fwd/bkwd x 5 mins ea; NE postural training    (R) Shoulder  repeated IR with towel OP x 10 reps; Inc, NW (corrected technique)    - retest: symptoms reduced in all planes    (R) Shoulder eccentric loading into IR yellowTB. 10 reps x 10 eccen cts; Inc, NW (req'd cueing)    (B) UE n.row, extension, w.row Red TB 10 reps x 10 cts ea; NE tired    Standing: (R) Shoulder IR with towel OP x 10 reps; Inc, NW (corrected technique)    - retest: Increased ROM into abduction to Nil loss Scifit UE only L1 fwd/bkwd x 5 mins ea; NE postural training    Sitting posture correction with lumbar roll and 2 inch step    C/s retraction x 5 reps; NE    + self OP 2 x 10 reps; NE    (R) Shoulder repeated IR with self OP using opposite hand 2 x 10 reps; P, NW (better)    (R) Shoulder IR eccentric load with redTB 10 reps x 10 eccen ct ea; P, NW    (B) UE redTB n.row, extension, w.row 10 reps x 10 cts ea; NE     Scifit UE only L1 Fwd/Bkwd x 5 mins ea; Inc, NW (R shoulder pain when going fwd)    C/s retraction with self OP x 10 reps; NE    (R) Shoulder repeated IR with self OP x 10 reps; Inc, NW (cueing req'd to correct form)    (R) Shoulder IR eccentric load with Red-yellowTB x 10 reps; Inc, NW    Supine: (R) shoulder flexion (long arc) x 10 reps; Inc, NW    SLY: (R) shoulder abduction (long arc) x 10 reps; Inc, NW (less than flexion)    SLY: (R) shoulder ER with elbow at 90 deg x 20 reps; NE    Prone: (R) N. Row, Ext. 10 reps x 5 cts; Inc, NW    (R) Shoulder repeated IR with self OP x 10 reps; Inc, NW    - retest: no change   Scifit UE only L1 Fwd/Bkwd x 5 mins ea; NE tired/achy shoulders    (R) Shoulder repeated IR with towel OP x 10 reps; P, NW (better)    - improved abduction to nil loss with ERP    (R) Shoulder repeated extension with cane OP 2 x 10 reps; P, NW (better)    - no pain abduction and flexion    (R) Shoulder repeated extension with mat/table OP  x 10 reps; P, NW (better)    - increased IR to minimal loss almost no pain     + 10 more reps; P, NW (better)    (R) UE eccentric extension  redTB 2 sets x 10 reps x 10 eccen ct ea; NE tired             Assessment:   Lizbeth was modified to do (R) Shoulder extension with overpressure as her new directional preference exercise.  She was able to have increased (R) Shoulder flexion, abduction to nil loss and internal rotation to minimal loss with minimal 1/10 to no pain/ache at endrange after this directional preference exercise.  She was instructed to change her HEP and was issued copies.  All questions answered at this time.    HEP:    - (R) Shoulder repeated extension with table or cane overpressure x 10 reps 3-4x/day  - (R) Shoulder eccentric extension with redTB 10 reps x 10 eccen ct each 1-2x/day    Goals:   (12 visits)  1. Patient to consistently perform their HEP and it's progression to maintain their improved condition.  2. Patient to have reduced and abolished (R) Shoulder symptoms to be able to reach up and sideways, carry, lift, dress up, clean, and sleep on her sides without producing or increasing symptoms in the (R) Shoulder.    3. Patient to have WNL of (R) Shoulder AROM in all directions with 4/5 MMT in all motions to promote all home, work, recreational,and functional activities without discomfort or deviation.     Plan:   Re-assess next session and continue with (R) Shoulder directional preference exercise, posture correction, strengthening/stability exercises, patient education, and HEP progression.

## 2024-12-18 NOTE — PROGRESS NOTES
Diagnosis:  Myalgia (M79.10)  Subacromial bursitis of right shoulder joint (M75.51)  Tendinopathy of rotator cuff, unspecified laterality (M67.919)  Chronic right shoulder pain (M25.511,G89.29)  Chronic pain of both shoulders (M25.511,G89.29,M25.512)         Referring Provider:  Behar, A. MD Date of Evaluation:  11/14/2024    Precautions:  None Date POC Expires: 2/14/2025     Date of Surgery: NA    Next MD visit:   none scheduled     Today's Date: 12/18/2024    Insurance Primary/Secondary:  MEDICARE PART A AND B/ MEDICAID      Authorized Visits: 12     Visit Number: 7    Charges: TherEx x 3  Total Timed Treatment: 47 min  Total Treatment time: 48 min    Medication Changes since last visit?: No    Subjective:   Lizbeth report that she feels better today.  No pain at this time and she can reach back easier.  She has been doing her new HEP ((R) Shoulder repeated extension) 1x/day.    Objective:     ROM/MMT: (Pre-test symptom: 0/10 (R) Shoulder)  Shoulder    Flexion:  Nil loss; P, NW (ERP 1/10)  Extension:  Minimal loss; P, NW (ERP 2/10)    Abduction: Minimal loss; P, NW (ERP 2/10)  IR:  Minimal loss; P, NW  2/10 (ERP)  ER:  Nil loss; NE      Treatment:  Date: 11/19/2024  Tx#: 2/12 Date: 11/22/2024  Tx#: 3/12 Date: 11/26/2024  Tx#: 4/12 Date: 12/2/2024  Tx#: 5/12 Date: 12/12/2024  Tx#: 6/12   Scifit UE only L1 fwd/bkwd x 5 mins ea; NE postural training    (R) Shoulder repeated IR x 10 reps; P, NW     + self OP with opposite hand 2 x 10 reps; P, NW    - increased (R) shoulder abduction to nil loss     + towel OP x 10 reps; P, NW    (B) UE n.row, extension, w.row yellowTB 10 reps x 10 cts ea; NE    (R) UE OH wall reach (scaption) with towel 10 reps x 10 cts ea; NE    Sitting posture correction with lumbar roll    C/s retraction with self OP 2 x 10 reps; NE    Standing: (R) Shoulder IR with towel OP x 10 reps; P, NW Scifit UE only L1 fwd/bkwd x 5 mins ea; NE postural training    (R) Shoulder repeated IR with towel OP x 10  reps; Inc, NW (corrected technique)    - retest: symptoms reduced in all planes    (R) Shoulder eccentric loading into IR yellowTB. 10 reps x 10 eccen cts; Inc, NW (req'd cueing)    (B) UE n.row, extension, w.row Red TB 10 reps x 10 cts ea; NE tired    Standing: (R) Shoulder IR with towel OP x 10 reps; Inc, NW (corrected technique)    - retest: Increased ROM into abduction to Nil loss Scifit UE only L1 fwd/bkwd x 5 mins ea; NE postural training    Sitting posture correction with lumbar roll and 2 inch step    C/s retraction x 5 reps; NE    + self OP 2 x 10 reps; NE    (R) Shoulder repeated IR with self OP using opposite hand 2 x 10 reps; P, NW (better)    (R) Shoulder IR eccentric load with redTB 10 reps x 10 eccen ct ea; P, NW    (B) UE redTB n.row, extension, w.row 10 reps x 10 cts ea; NE     Scifit UE only L1 Fwd/Bkwd x 5 mins ea; Inc, NW (R shoulder pain when going fwd)    C/s retraction with self OP x 10 reps; NE    (R) Shoulder repeated IR with self OP x 10 reps; Inc, NW (cueing req'd to correct form)    (R) Shoulder IR eccentric load with Red-yellowTB x 10 reps; Inc, NW    Supine: (R) shoulder flexion (long arc) x 10 reps; Inc, NW    SLY: (R) shoulder abduction (long arc) x 10 reps; Inc, NW (less than flexion)    SLY: (R) shoulder ER with elbow at 90 deg x 20 reps; NE    Prone: (R) N. Row, Ext. 10 reps x 5 cts; Inc, NW    (R) Shoulder repeated IR with self OP x 10 reps; Inc, NW    - retest: no change   Scifit UE only L1 Fwd/Bkwd x 5 mins ea; NE tired/achy shoulders    (R) Shoulder repeated IR with towel OP x 10 reps; P, NW (better)    - improved abduction to nil loss with ERP    (R) Shoulder repeated extension with cane OP 2 x 10 reps; P, NW (better)    - no pain abduction and flexion    (R) Shoulder repeated extension with mat/table OP  x 10 reps; P, NW (better)    - increased IR to minimal loss almost no pain     + 10 more reps; P, NW (better)    (R) UE eccentric extension redTB 2 sets x 10 reps x 10 eccen  ct ea; NE tired                 Date: 12/18/2024  Tx#: 7/12        Scifit UE only L5 Fwd/Bkwd x 5 mins ea; NE tired/achy shoulders    (R) Shoulder repeated extension with table OP x 10 reps; P, NW    + eccentric load with greenTB 10 reps x 5 eccen ct ea; P, NW    (R) UE ER/IR with greenTB 2 x 10 reps; NE    Seated: (R) UE ER @ 90/90 in abduction 2 lbs 2 x10 reps; NE    (R) UE D1D2 flexion/extension redT x 10 reps; P, NW    (R) Shoulder repeated extension on mat/table x 10 reps; P, NW          Assessment:   Lizbeth is responding to (R) Shoulder extension directional preference exercise.  She has less pain/ache at endrange with increased (L) Shoulder AROM in all directions.  All questions answered at this time.    HEP:    - (R) Shoulder repeated extension with table or cane overpressure x 10 reps 3-4x/day  - (R) Shoulder eccentric extension with greenTB 10 reps x 10 eccen ct each 1-2x/day    Goals:   (12 visits)  1. Patient to consistently perform their HEP and it's progression to maintain their improved condition.  2. Patient to have reduced and abolished (R) Shoulder symptoms to be able to reach up and sideways, carry, lift, dress up, clean, and sleep on her sides without producing or increasing symptoms in the (R) Shoulder.    3. Patient to have WNL of (R) Shoulder AROM in all directions with 4/5 MMT in all motions to promote all home, work, recreational,and functional activities without discomfort or deviation.     Plan:   Re-assess next session and continue with (R) Shoulder directional preference exercise, posture correction, strengthening/stability exercises, patient education, and HEP progression.

## 2024-12-20 NOTE — PROGRESS NOTES
Diagnosis:  Myalgia (M79.10)  Subacromial bursitis of right shoulder joint (M75.51)  Tendinopathy of rotator cuff, unspecified laterality (M67.919)  Chronic right shoulder pain (M25.511,G89.29)  Chronic pain of both shoulders (M25.511,G89.29,M25.512)         Referring Provider:  Behar, A. MD Date of Evaluation:  11/14/2024    Precautions:  None Date POC Expires: 2/14/2025     Date of Surgery: NA    Next MD visit:   none scheduled     Today's Date: 12/20/2024    Insurance Primary/Secondary:  MEDICARE PART A AND B/ MEDICAID      Authorized Visits: 12     Visit Number: 8    Charges: TherEx x 3  Total Timed Treatment: 48 min  Total Treatment time: 49 min    Medication Changes since last visit?: No    Subjective:   Lizbeth is able to reach with the (R) arm overhead without pain/ache.  She still have limited IR and extension motions but with less symptoms at the range.  She state that she does her HEP regularly at home.        Objective:     ROM/MMT: (Pre-test symptom: 0/10 (R) Shoulder)  Shoulder    Flexion:  Nil loss; NE  Extension:  Minimal loss; P, NW (ERP 2/10)    Abduction: Nil loss; NE  IR:  Minimal loss; P, NW  2/10 (ERP)  ER:  Nil loss; NE      Treatment:  Date: 11/19/2024  Tx#: 2/12 Date: 11/22/2024  Tx#: 3/12 Date: 11/26/2024  Tx#: 4/12 Date: 12/2/2024  Tx#: 5/12 Date: 12/12/2024  Tx#: 6/12   Scifit UE only L1 fwd/bkwd x 5 mins ea; NE postural training    (R) Shoulder repeated IR x 10 reps; P, NW     + self OP with opposite hand 2 x 10 reps; P, NW    - increased (R) shoulder abduction to nil loss     + towel OP x 10 reps; P, NW    (B) UE n.row, extension, w.row yellowTB 10 reps x 10 cts ea; NE    (R) UE OH wall reach (scaption) with towel 10 reps x 10 cts ea; NE    Sitting posture correction with lumbar roll    C/s retraction with self OP 2 x 10 reps; NE    Standing: (R) Shoulder IR with towel OP x 10 reps; P, NW Scifit UE only L1 fwd/bkwd x 5 mins ea; NE postural training    (R) Shoulder repeated IR with towel  OP x 10 reps; Inc, NW (corrected technique)    - retest: symptoms reduced in all planes    (R) Shoulder eccentric loading into IR yellowTB. 10 reps x 10 eccen cts; Inc, NW (req'd cueing)    (B) UE n.row, extension, w.row Red TB 10 reps x 10 cts ea; NE tired    Standing: (R) Shoulder IR with towel OP x 10 reps; Inc, NW (corrected technique)    - retest: Increased ROM into abduction to Nil loss Scifit UE only L1 fwd/bkwd x 5 mins ea; NE postural training    Sitting posture correction with lumbar roll and 2 inch step    C/s retraction x 5 reps; NE    + self OP 2 x 10 reps; NE    (R) Shoulder repeated IR with self OP using opposite hand 2 x 10 reps; P, NW (better)    (R) Shoulder IR eccentric load with redTB 10 reps x 10 eccen ct ea; P, NW    (B) UE redTB n.row, extension, w.row 10 reps x 10 cts ea; NE     Scifit UE only L1 Fwd/Bkwd x 5 mins ea; Inc, NW (R shoulder pain when going fwd)    C/s retraction with self OP x 10 reps; NE    (R) Shoulder repeated IR with self OP x 10 reps; Inc, NW (cueing req'd to correct form)    (R) Shoulder IR eccentric load with Red-yellowTB x 10 reps; Inc, NW    Supine: (R) shoulder flexion (long arc) x 10 reps; Inc, NW    SLY: (R) shoulder abduction (long arc) x 10 reps; Inc, NW (less than flexion)    SLY: (R) shoulder ER with elbow at 90 deg x 20 reps; NE    Prone: (R) N. Row, Ext. 10 reps x 5 cts; Inc, NW    (R) Shoulder repeated IR with self OP x 10 reps; Inc, NW    - retest: no change   Scifit UE only L1 Fwd/Bkwd x 5 mins ea; NE tired/achy shoulders    (R) Shoulder repeated IR with towel OP x 10 reps; P, NW (better)    - improved abduction to nil loss with ERP    (R) Shoulder repeated extension with cane OP 2 x 10 reps; P, NW (better)    - no pain abduction and flexion    (R) Shoulder repeated extension with mat/table OP  x 10 reps; P, NW (better)    - increased IR to minimal loss almost no pain     + 10 more reps; P, NW (better)    (R) UE eccentric extension redTB 2 sets x 10 reps x  10 eccen ct ea; NE tired                 Date: 12/18/2024  Tx#: 7/12 Date: 12/20/2024  Tx#: 8/12       Scifit UE only L5 Fwd/Bkwd x 5 mins ea; NE tired/achy shoulders    (R) Shoulder repeated extension with table OP x 10 reps; P, NW    + eccentric load with greenTB 10 reps x 5 eccen ct ea; P, NW    (R) UE ER/IR with greenTB 2 x 10 reps; NE    Seated: (R) UE ER @ 90/90 in abduction 2 lbs 2 x10 reps; NE    (R) UE D1D2 flexion/extension redT x 10 reps; P, NW    (R) Shoulder repeated extension on mat/table x 10 reps; P, NW NuStep UE only L1 x 10 mins; NE     (R) Shoulder repeated extension with table OP x 10 reps; P, NW    (R) Shoulder repeated IR with table OP x 10 reps; P, NW    + eccentric extension and IR load with blueTB 10 reps x 5 eccen ct ea; P, NW    (R) UE ER/IR with greenTB 2 x 10 reps; NE    Seated: (R) UE ER @ 90/90 in abduction 2 lbs 2 x10 reps; NE    Prone: (R) UE rhomboid/scaption 2 lbs 2 x 10 rep; NE    (R) Shoulder repeated extension with table OP x 10 reps; P, NW    (R) Shoulder repeated IR with table OP x 10 reps; P, NW           Assessment:   Lizbeth continue to improve with (R) Shoulder extension directional preference exercise.  Added repeated IR to her directional preference exercise to regain full IR motion.  She is progressing with added resistance for strengthening.  Minimal cueing needed to correct posture and exercise technique.  All questions answered at this time.    HEP:    - (R) Shoulder repeated extension and IR with table overpressure x 10 reps 2-3x/day  - (R) Shoulder eccentric extension with green or blue TB 10 reps x 10 eccen ct each 2-3x/day    Goals:   (12 visits)  1. Patient to consistently perform their HEP and it's progression to maintain their improved condition.  2. Patient to have reduced and abolished (R) Shoulder symptoms to be able to reach up and sideways, carry, lift, dress up, clean, and sleep on her sides without producing or increasing symptoms in the (R) Shoulder.     3. Patient to have WNL of (R) Shoulder AROM in all directions with 4/5 MMT in all motions to promote all home, work, recreational,and functional activities without discomfort or deviation.     Plan:   Re-assess next session and continue with (R) Shoulder directional preference exercise, posture correction, strengthening/stability exercises, patient education, and HEP progression.

## 2024-12-20 NOTE — TELEPHONE ENCOUNTER
Pt is requesting refill for the following medication        famotidine 20 MG Oral Tab, Take 1 tablet (20 mg total) by mouth 2 (two) times daily., Disp: 90 tablet, Rfl: 3

## 2024-12-23 NOTE — PROGRESS NOTES
Chief Complaint   Patient presents with    Hospital F/U    Dizziness     Concerned w/ low bp     HPI:   Lizbeth Matute is a 79 year old female who presents to clinic for hospital follow-up.  Was admitted on 11/20 and was discharged the next day.  Was admitted for chest pain complaints and evidence of a pericardial effusion.  She had been having chest pain for 3 to 4 days prior to going to the ER.  CT PE ruled out a pulmonary embolism but showed a moderate pericardial effusion.  Was advised to follow-up with cardiology.  Currently not having further chest pain symptoms.    REVIEW OF SYSTEMS:   Negative, except per HPI.     EXAM:   /71 (BP Location: Right arm, Patient Position: Sitting, Cuff Size: adult)   Pulse 67   Wt 133 lb (60.3 kg)   BMI 25.97 kg/m²   Body mass index is 25.97 kg/m².  GENERAL: well developed, well nourished, in no apparent distress  SKIN: no rashes, no suspicious lesions  HEENT: atraumatic, normocephalic  EYES: PERRLA, EOMI,conjunctiva are clear  NECK: supple, no adenopathy  LUNGS: clear to auscultation  CARDIO: RRR without murmur    ASSESSMENT AND PLAN:     1. Chronic right shoulder pain  Advised patient to take NSAIDs sparingly as this can raise her blood pressure.  - diclofenac 75 MG Oral Tab EC; Take 1 tablet (75 mg total) by mouth 2 (two) times daily as needed (con comida).  Dispense: 120 tablet; Refill: 1  - Physiatry Referral - [place] ([facility])    2. History of chest pain  -Follow-up with cardiology advised - patient with a moderate pericardial effusion   - [facility] CARDIOLOGY EXTERNAL     RTC if no improvement in symptoms. Red flags discussed to go to ER.     Kimmy Talbot MD  12/23/2024  9:51 AM

## 2025-01-06 NOTE — PROGRESS NOTES
Diagnosis:  Myalgia (M79.10)  Subacromial bursitis of right shoulder joint (M75.51)  Tendinopathy of rotator cuff, unspecified laterality (M67.919)  Chronic right shoulder pain (M25.511,G89.29)  Chronic pain of both shoulders (M25.511,G89.29,M25.512)         Referring Provider:  Behar, A. MD Date of Evaluation:  11/14/2024    Precautions:  None Date POC Expires: 2/14/2025     Date of Surgery: NA    Next MD visit:   none scheduled     Today's Date: 1/6/2025    Insurance Primary/Secondary:  MEDICARE PART A AND B/ MEDICAID      Authorized Visits: 12     Visit Number: 9    Charges: TherEx x 3  Total Timed Treatment: 44 min  Total Treatment time: 45 min    Medication Changes since last visit?: No    Subjective:   Lizbeth continues to c/o of pain/ache/soreness in the (R) shoulder which radiates into the UE up to the elbow.  She cites having difficulty sleeping due to symptoms experienced and says that sleeping on her back increases her pain and therefore sleeps on her (L) side.  She says that she doesn't like to perform her HEP due to the pain it causes her during and after the exercises - was educated on the importance of performing her exercises in order to improve her condition.        Objective:     ROM/MMT: (Pre-test symptom: 4-5/10 (R) Shoulder)  Shoulder    Flexion:  Nil loss; NE  Extension:  Minimal loss; Inc, NW (ERP 7/10)    Abduction: Nil loss; NE (crepitus noted in R shoulder)  IR:  Minimal loss; Inc, NW (ERP 8-9/10)  ER:  Nil loss; NE      Treatment:  Date: 11/19/2024  Tx#: 2/12 Date: 11/22/2024  Tx#: 3/12 Date: 11/26/2024  Tx#: 4/12 Date: 12/2/2024  Tx#: 5/12 Date: 12/12/2024  Tx#: 6/12   Scifit UE only L1 fwd/bkwd x 5 mins ea; NE postural training    (R) Shoulder repeated IR x 10 reps; P, NW     + self OP with opposite hand 2 x 10 reps; P, NW    - increased (R) shoulder abduction to nil loss     + towel OP x 10 reps; P, NW    (B) UE n.row, extension, w.row yellowTB 10 reps x 10 cts ea; NE    (R) UE OH wall  reach (scaption) with towel 10 reps x 10 cts ea; NE    Sitting posture correction with lumbar roll    C/s retraction with self OP 2 x 10 reps; NE    Standing: (R) Shoulder IR with towel OP x 10 reps; P, NW Scifit UE only L1 fwd/bkwd x 5 mins ea; NE postural training    (R) Shoulder repeated IR with towel OP x 10 reps; Inc, NW (corrected technique)    - retest: symptoms reduced in all planes    (R) Shoulder eccentric loading into IR yellowTB. 10 reps x 10 eccen cts; Inc, NW (req'd cueing)    (B) UE n.row, extension, w.row Red TB 10 reps x 10 cts ea; NE tired    Standing: (R) Shoulder IR with towel OP x 10 reps; Inc, NW (corrected technique)    - retest: Increased ROM into abduction to Nil loss Scifit UE only L1 fwd/bkwd x 5 mins ea; NE postural training    Sitting posture correction with lumbar roll and 2 inch step    C/s retraction x 5 reps; NE    + self OP 2 x 10 reps; NE    (R) Shoulder repeated IR with self OP using opposite hand 2 x 10 reps; P, NW (better)    (R) Shoulder IR eccentric load with redTB 10 reps x 10 eccen ct ea; P, NW    (B) UE redTB n.row, extension, w.row 10 reps x 10 cts ea; NE     Scifit UE only L1 Fwd/Bkwd x 5 mins ea; Inc, NW (R shoulder pain when going fwd)    C/s retraction with self OP x 10 reps; NE    (R) Shoulder repeated IR with self OP x 10 reps; Inc, NW (cueing req'd to correct form)    (R) Shoulder IR eccentric load with Red-yellowTB x 10 reps; Inc, NW    Supine: (R) shoulder flexion (long arc) x 10 reps; Inc, NW    SLY: (R) shoulder abduction (long arc) x 10 reps; Inc, NW (less than flexion)    SLY: (R) shoulder ER with elbow at 90 deg x 20 reps; NE    Prone: (R) N. Row, Ext. 10 reps x 5 cts; Inc, NW    (R) Shoulder repeated IR with self OP x 10 reps; Inc, NW    - retest: no change   Scifit UE only L1 Fwd/Bkwd x 5 mins ea; NE tired/achy shoulders    (R) Shoulder repeated IR with towel OP x 10 reps; P, NW (better)    - improved abduction to nil loss with ERP    (R) Shoulder repeated  extension with cane OP 2 x 10 reps; P, NW (better)    - no pain abduction and flexion    (R) Shoulder repeated extension with mat/table OP  x 10 reps; P, NW (better)    - increased IR to minimal loss almost no pain     + 10 more reps; P, NW (better)    (R) UE eccentric extension redTB 2 sets x 10 reps x 10 eccen ct ea; NE tired                 Date: 12/18/2024  Tx#: 7/12 Date: 12/20/2024  Tx#: 8/12 Date: 1/6/2025  Tx#: 9/12      Scifit UE only L5 Fwd/Bkwd x 5 mins ea; NE tired/achy shoulders    (R) Shoulder repeated extension with table OP x 10 reps; P, NW    + eccentric load with greenTB 10 reps x 5 eccen ct ea; P, NW    (R) UE ER/IR with greenTB 2 x 10 reps; NE    Seated: (R) UE ER @ 90/90 in abduction 2 lbs 2 x10 reps; NE    (R) UE D1D2 flexion/extension redT x 10 reps; P, NW    (R) Shoulder repeated extension on mat/table x 10 reps; P, NW NuStep UE only L1 x 10 mins; NE     (R) Shoulder repeated extension with table OP x 10 reps; P, NW    (R) Shoulder repeated IR with table OP x 10 reps; P, NW    + eccentric extension and IR load with blueTB 10 reps x 5 eccen ct ea; P, NW    (R) UE ER/IR with greenTB 2 x 10 reps; NE    Seated: (R) UE ER @ 90/90 in abduction 2 lbs 2 x10 reps; NE    Prone: (R) UE rhomboid/scaption 2 lbs 2 x 10 rep; NE    (R) Shoulder repeated extension with table OP x 10 reps; P, NW    (R) Shoulder repeated IR with table OP x 10 reps; P, NW   SciFit UE only L1 Fwd/Bkwd x 3 mins ea; Inc, NW (sore in R shoulder/UE)    (R) Shoulder repeated extension with table OP x 10 reps; Inc, NW    (R) Shoulder repeated IR with table OP x 10 reps; Inc, NW    Eccentric loading into extension and IR with blue-greenTB 10 reps x 5 eccen ct ea; Inc, NW    (B) UE Ext and N. Rows Green TB. 10 reps x 5 cts ea; Inc, NW    Seated: (R) UE ER @ 90/90 in abduction 2-3 lbs 2 sets x 10 reps; Inc, nW    (R) Shoulder repeated extension with table OP x 10 reps; Inc NW    (R) Shoulder repeated IR with table OP x 10 reps; Inc,  NW    - retest: Dec, B          Assessment:   Lizbeth continues to present with symptoms in the (R) shoulder at rest and with most functional movement.  She is responding well to her (R) shoulder DP exercises into extension and IR.  She demonstrated decreased symptoms in all planes of motion upon retesting.  The level of resistance with her eccentric exercises was reduced on this date from Blue to Green due to exacerbated symptoms in the (R) shoulder during exercise.  She requireed repeated cueing to maintain posture/technique with poor carryover, she also demonstrated a lack of motivation to complete her exercises.  She was reminded the importance of performing her HEP to improve her condition and verbalized understanding.  All questions answered at this time.    HEP:    - (R) Shoulder repeated extension and IR with table overpressure x 10 reps 2-3x/day  - (R) Shoulder eccentric extension with green or blue TB 10 reps x 10 eccen ct each 2-3x/day    Goals:   (12 visits)  1. Patient to consistently perform their HEP and it's progression to maintain their improved condition.  2. Patient to have reduced and abolished (R) Shoulder symptoms to be able to reach up and sideways, carry, lift, dress up, clean, and sleep on her sides without producing or increasing symptoms in the (R) Shoulder.    3. Patient to have WNL of (R) Shoulder AROM in all directions with 4/5 MMT in all motions to promote all home, work, recreational,and functional activities without discomfort or deviation.     Plan:   Re-assess next session and continue with (R) Shoulder directional preference exercise, posture correction, strengthening/stability exercises, patient education, and HEP progression.     On this date patient was seen by Stephen Prieto PTA under the supervision of Saji Rollins PT. Cert MDT.

## 2025-01-13 NOTE — PROGRESS NOTES
Diagnosis:  Myalgia (M79.10)  Subacromial bursitis of right shoulder joint (M75.51)  Tendinopathy of rotator cuff, unspecified laterality (M67.919)  Chronic right shoulder pain (M25.511,G89.29)  Chronic pain of both shoulders (M25.511,G89.29,M25.512)         Referring Provider:  Behar, A. MD Date of Evaluation:  11/14/2024    Precautions:  None Date POC Expires: 2/14/2025     Date of Surgery: NA    Next MD visit:   none scheduled     Today's Date: 1/13/2025    Insurance Primary/Secondary:  MEDICARE PART A AND B/ MEDICAID      Authorized Visits: 12     Visit Number: 10    Charges: TherEx x 3  Total Timed Treatment: 44 min  Total Treatment time: 45 min    Medication Changes since last visit?: No    Subjective:   Lizbeth reports feeling a little better in the (R) shoulder on this date.  She says that she isn't always doing her prescribed HEP at home.  She currently cites feeling minimal symptoms in the (R) shoulder with movement of the UE.  Lizbeth was educated on the importance of performing her exercises in order to improve her condition and understood. Patient takes Rx prescribed for her pain.     Objective:     ROM/MMT: (Pre-test symptom: 2-3/10 (R) Shoulder)  Shoulder    Flexion:  Nil loss; NE  Extension:  Min loss; Inc, NW (ERP 6-7/10)    Abduction: Nil loss; NE (crepitus noted in R shoulder)  IR:  Min loss; Inc, NW (ERP 6-7/10)  ER:  Nil loss; NE      Treatment:  Date: 11/19/2024  Tx#: 2/12 Date: 11/22/2024  Tx#: 3/12 Date: 11/26/2024  Tx#: 4/12 Date: 12/2/2024  Tx#: 5/12 Date: 12/12/2024  Tx#: 6/12   Scifit UE only L1 fwd/bkwd x 5 mins ea; NE postural training    (R) Shoulder repeated IR x 10 reps; P, NW     + self OP with opposite hand 2 x 10 reps; P, NW    - increased (R) shoulder abduction to nil loss     + towel OP x 10 reps; P, NW    (B) UE n.row, extension, w.row yellowTB 10 reps x 10 cts ea; NE    (R) UE OH wall reach (scaption) with towel 10 reps x 10 cts ea; NE    Sitting posture correction with lumbar  roll    C/s retraction with self OP 2 x 10 reps; NE    Standing: (R) Shoulder IR with towel OP x 10 reps; P, NW Scifit UE only L1 fwd/bkwd x 5 mins ea; NE postural training    (R) Shoulder repeated IR with towel OP x 10 reps; Inc, NW (corrected technique)    - retest: symptoms reduced in all planes    (R) Shoulder eccentric loading into IR yellowTB. 10 reps x 10 eccen cts; Inc, NW (req'd cueing)    (B) UE n.row, extension, w.row Red TB 10 reps x 10 cts ea; NE tired    Standing: (R) Shoulder IR with towel OP x 10 reps; Inc, NW (corrected technique)    - retest: Increased ROM into abduction to Nil loss Scifit UE only L1 fwd/bkwd x 5 mins ea; NE postural training    Sitting posture correction with lumbar roll and 2 inch step    C/s retraction x 5 reps; NE    + self OP 2 x 10 reps; NE    (R) Shoulder repeated IR with self OP using opposite hand 2 x 10 reps; P, NW (better)    (R) Shoulder IR eccentric load with redTB 10 reps x 10 eccen ct ea; P, NW    (B) UE redTB n.row, extension, w.row 10 reps x 10 cts ea; NE     Scifit UE only L1 Fwd/Bkwd x 5 mins ea; Inc, NW (R shoulder pain when going fwd)    C/s retraction with self OP x 10 reps; NE    (R) Shoulder repeated IR with self OP x 10 reps; Inc, NW (cueing req'd to correct form)    (R) Shoulder IR eccentric load with Red-yellowTB x 10 reps; Inc, NW    Supine: (R) shoulder flexion (long arc) x 10 reps; Inc, NW    SLY: (R) shoulder abduction (long arc) x 10 reps; Inc, NW (less than flexion)    SLY: (R) shoulder ER with elbow at 90 deg x 20 reps; NE    Prone: (R) N. Row, Ext. 10 reps x 5 cts; Inc, NW    (R) Shoulder repeated IR with self OP x 10 reps; Inc, NW    - retest: no change   Scifit UE only L1 Fwd/Bkwd x 5 mins ea; NE tired/achy shoulders    (R) Shoulder repeated IR with towel OP x 10 reps; P, NW (better)    - improved abduction to nil loss with ERP    (R) Shoulder repeated extension with cane OP 2 x 10 reps; P, NW (better)    - no pain abduction and flexion    (R)  Shoulder repeated extension with mat/table OP  x 10 reps; P, NW (better)    - increased IR to minimal loss almost no pain     + 10 more reps; P, NW (better)    (R) UE eccentric extension redTB 2 sets x 10 reps x 10 eccen ct ea; NE tired                 Date: 12/18/2024  Tx#: 7/12 Date: 12/20/2024  Tx#: 8/12 Date: 1/6/2025  Tx#: 9/12 Date: 1/13/2025  Tx#: 10/12     Scifit UE only L5 Fwd/Bkwd x 5 mins ea; NE tired/achy shoulders    (R) Shoulder repeated extension with table OP x 10 reps; P, NW    + eccentric load with greenTB 10 reps x 5 eccen ct ea; P, NW    (R) UE ER/IR with greenTB 2 x 10 reps; NE    Seated: (R) UE ER @ 90/90 in abduction 2 lbs 2 x10 reps; NE    (R) UE D1D2 flexion/extension redT x 10 reps; P, NW    (R) Shoulder repeated extension on mat/table x 10 reps; P, NW NuStep UE only L1 x 10 mins; NE     (R) Shoulder repeated extension with table OP x 10 reps; P, NW    (R) Shoulder repeated IR with table OP x 10 reps; P, NW    + eccentric extension and IR load with blueTB 10 reps x 5 eccen ct ea; P, NW    (R) UE ER/IR with greenTB 2 x 10 reps; NE    Seated: (R) UE ER @ 90/90 in abduction 2 lbs 2 x10 reps; NE    Prone: (R) UE rhomboid/scaption 2 lbs 2 x 10 rep; NE    (R) Shoulder repeated extension with table OP x 10 reps; P, NW    (R) Shoulder repeated IR with table OP x 10 reps; P, NW   SciFit UE only L1 Fwd/Bkwd x 3 mins ea; Inc, NW (sore in R shoulder/UE)    (R) Shoulder repeated extension with table OP x 10 reps; Inc, NW    (R) Shoulder repeated IR with table OP x 10 reps; Inc, NW    Eccentric loading into extension and IR with blue-greenTB 10 reps x 5 eccen ct ea; Inc, NW    (B) UE Ext and N. Rows Green TB. 10 reps x 5 cts ea; Inc, NW    Seated: (R) UE ER @ 90/90 in abduction 2-3 lbs 2 sets x 10 reps; Inc, nW    (R) Shoulder repeated extension with table OP x 10 reps; Inc, NW    (R) Shoulder repeated IR with table OP x 10 reps; Inc, NW    - retest: Dec, B   SciFit UE only L1 Fwd/Bkwd x 3 mins ea; Inc,  NW (sore in R shoulder/UE)    (R) Shoulder repeated extension with table OP x 10 reps; Inc, NW    (R) Shoulder repeated IR with table OP x 10 reps; Inc, NW    Eccentric loading into extension and IR with blue-greenTB 10 reps x 5 eccen ct ea; Inc, NW    (B) UE Ext and N. Rows Green TB. 10 reps x 5 cts ea; Inc, NW    Seated: (R) UE ER @ 90/90 in abduction 3-4 lbs 2 sets x 10 reps; Inc, NW    (R) Shoulder repeated extension with table OP x 10 reps; Inc, NW    (R) Shoulder repeated IR with table OP x 10 reps; Inc, NW       Assessment:   Lizbeth condition is improving in her in that she is able to raise her (R) UE, mop the floor, and perform other ADLs with less pain in the (R) UE.  She is responding well to her (R) shoulder DP exercises into extension and IR.  She demonstrated decreased symptoms in all planes of motion upon retesting.  She continues to require repeated cueing to maintain posture/technique with poor carryover, she also exhibits an ache in the (R) shoulder with her exercises but is no worse as a result.  She was reminded the importance of performing her HEP to improve her condition and verbalized understanding.  All questions answered at this time.    HEP:    - (R) Shoulder repeated extension and IR with table overpressure x 10 reps 2-3x/day  - (R) Shoulder eccentric extension and IR with green or blue TB 10 reps x 10 eccen ct each 2-3x/day    Goals:   (12 visits)  1. Patient to consistently perform their HEP and it's progression to maintain their improved condition.  2. Patient to have reduced and abolished (R) Shoulder symptoms to be able to reach up and sideways, carry, lift, dress up, clean, and sleep on her sides without producing or increasing symptoms in the (R) Shoulder.    3. Patient to have WNL of (R) Shoulder AROM in all directions with 4/5 MMT in all motions to promote all home, work, recreational,and functional activities without discomfort or deviation.     Plan:   Re-assess next session and  continue with (R) Shoulder directional preference exercise, posture correction, strengthening/stability exercises, patient education, and HEP progression.     On this date patient was seen by Stephen Prieto PTA under the supervision of Saji Rollins PT. Cert MDT.

## 2025-01-17 NOTE — PROGRESS NOTES
Diagnosis:  Myalgia (M79.10)  Subacromial bursitis of right shoulder joint (M75.51)  Tendinopathy of rotator cuff, unspecified laterality (M67.919)  Chronic right shoulder pain (M25.511,G89.29)  Chronic pain of both shoulders (M25.511,G89.29,M25.512)         Referring Provider:  Behar, A. MD Date of Evaluation:  11/14/2024    Precautions:  None Date POC Expires: 2/14/2025     Date of Surgery: NA    Next MD visit:   none scheduled     Today's Date: 1/17/2025    Insurance Primary/Secondary:  MEDICARE PART A AND B/ MEDICAID      Authorized Visits: 12     Visit Number: 11    Charges: TherEx x 3  Total Timed Treatment: 44 min  Total Treatment time: 45 min    Medication Changes since last visit?: No    Subjective:   Lizbeht reports feeling a little better in the (R) shoulder on this date with ache near the posterior aspect of the (R) shoulder with certain functional motions (raising arm above shoulder level).  She still takes prescribed Rx for pain relief.  She verbalized intermittently being compliant to her HEP.     Objective:     ROM/MMT: (Pre-test symptom: 2-3/10 (R) Shoulder)  Shoulder    Flexion:  Nil loss; NE  Extension:  Min loss; Inc, NW (ERP 6-7/10)    Abduction: Nil loss; NE (crepitus noted in R shoulder)  IR:  Min loss; Inc, NW (ERP 6-7/10)  ER:  Nil loss; NE      Treatment:  Date: 11/19/2024  Tx#: 2/12 Date: 11/22/2024  Tx#: 3/12 Date: 11/26/2024  Tx#: 4/12 Date: 12/2/2024  Tx#: 5/12 Date: 12/12/2024  Tx#: 6/12   Scifit UE only L1 fwd/bkwd x 5 mins ea; NE postural training    (R) Shoulder repeated IR x 10 reps; P, NW     + self OP with opposite hand 2 x 10 reps; P, NW    - increased (R) shoulder abduction to nil loss     + towel OP x 10 reps; P, NW    (B) UE n.row, extension, w.row yellowTB 10 reps x 10 cts ea; NE    (R) UE OH wall reach (scaption) with towel 10 reps x 10 cts ea; NE    Sitting posture correction with lumbar roll    C/s retraction with self OP 2 x 10 reps; NE    Standing: (R) Shoulder IR with  towel OP x 10 reps; P, NW Scifit UE only L1 fwd/bkwd x 5 mins ea; NE postural training    (R) Shoulder repeated IR with towel OP x 10 reps; Inc, NW (corrected technique)    - retest: symptoms reduced in all planes    (R) Shoulder eccentric loading into IR yellowTB. 10 reps x 10 eccen cts; Inc, NW (req'd cueing)    (B) UE n.row, extension, w.row Red TB 10 reps x 10 cts ea; NE tired    Standing: (R) Shoulder IR with towel OP x 10 reps; Inc, NW (corrected technique)    - retest: Increased ROM into abduction to Nil loss Scifit UE only L1 fwd/bkwd x 5 mins ea; NE postural training    Sitting posture correction with lumbar roll and 2 inch step    C/s retraction x 5 reps; NE    + self OP 2 x 10 reps; NE    (R) Shoulder repeated IR with self OP using opposite hand 2 x 10 reps; P, NW (better)    (R) Shoulder IR eccentric load with redTB 10 reps x 10 eccen ct ea; P, NW    (B) UE redTB n.row, extension, w.row 10 reps x 10 cts ea; NE     Scifit UE only L1 Fwd/Bkwd x 5 mins ea; Inc, NW (R shoulder pain when going fwd)    C/s retraction with self OP x 10 reps; NE    (R) Shoulder repeated IR with self OP x 10 reps; Inc, NW (cueing req'd to correct form)    (R) Shoulder IR eccentric load with Red-yellowTB x 10 reps; Inc, NW    Supine: (R) shoulder flexion (long arc) x 10 reps; Inc, NW    SLY: (R) shoulder abduction (long arc) x 10 reps; Inc, NW (less than flexion)    SLY: (R) shoulder ER with elbow at 90 deg x 20 reps; NE    Prone: (R) N. Row, Ext. 10 reps x 5 cts; Inc, NW    (R) Shoulder repeated IR with self OP x 10 reps; Inc, NW    - retest: no change   Scifit UE only L1 Fwd/Bkwd x 5 mins ea; NE tired/achy shoulders    (R) Shoulder repeated IR with towel OP x 10 reps; P, NW (better)    - improved abduction to nil loss with ERP    (R) Shoulder repeated extension with cane OP 2 x 10 reps; P, NW (better)    - no pain abduction and flexion    (R) Shoulder repeated extension with mat/table OP  x 10 reps; P, NW (better)    - increased  IR to minimal loss almost no pain     + 10 more reps; P, NW (better)    (R) UE eccentric extension redTB 2 sets x 10 reps x 10 eccen ct ea; NE tired                 Date: 12/18/2024  Tx#: 7/12 Date: 12/20/2024  Tx#: 8/12 Date: 1/6/2025  Tx#: 9/12 Date: 1/13/2025  Tx#: 10/12 Date: 1/17/2025  Tx: 11/12    Scifit UE only L5 Fwd/Bkwd x 5 mins ea; NE tired/achy shoulders    (R) Shoulder repeated extension with table OP x 10 reps; P, NW    + eccentric load with greenTB 10 reps x 5 eccen ct ea; P, NW    (R) UE ER/IR with greenTB 2 x 10 reps; NE    Seated: (R) UE ER @ 90/90 in abduction 2 lbs 2 x10 reps; NE    (R) UE D1D2 flexion/extension redT x 10 reps; P, NW    (R) Shoulder repeated extension on mat/table x 10 reps; P, NW NuStep UE only L1 x 10 mins; NE     (R) Shoulder repeated extension with table OP x 10 reps; P, NW    (R) Shoulder repeated IR with table OP x 10 reps; P, NW    + eccentric extension and IR load with blueTB 10 reps x 5 eccen ct ea; P, NW    (R) UE ER/IR with greenTB 2 x 10 reps; NE    Seated: (R) UE ER @ 90/90 in abduction 2 lbs 2 x10 reps; NE    Prone: (R) UE rhomboid/scaption 2 lbs 2 x 10 rep; NE    (R) Shoulder repeated extension with table OP x 10 reps; P, NW    (R) Shoulder repeated IR with table OP x 10 reps; P, NW   SciFit UE only L1 Fwd/Bkwd x 3 mins ea; Inc, NW (sore in R shoulder/UE)    (R) Shoulder repeated extension with table OP x 10 reps; Inc, NW    (R) Shoulder repeated IR with table OP x 10 reps; Inc, NW    Eccentric loading into extension and IR with blue-greenTB 10 reps x 5 eccen ct ea; Inc, NW    (B) UE Ext and N. Rows Green TB. 10 reps x 5 cts ea; Inc, NW    Seated: (R) UE ER @ 90/90 in abduction 2-3 lbs 2 sets x 10 reps; Inc, nW    (R) Shoulder repeated extension with table OP x 10 reps; Inc, NW    (R) Shoulder repeated IR with table OP x 10 reps; Inc, NW    - retest: Dec, B   SciFit UE only L1 Fwd/Bkwd x 3 mins ea; Inc, NW (sore in R shoulder/UE)    (R) Shoulder repeated extension  with table OP x 10 reps; Inc, NW    (R) Shoulder repeated IR with table OP x 10 reps; Inc, NW    Eccentric loading into extension and IR with blue-greenTB 10 reps x 5 eccen ct ea; Inc, NW    (B) UE Ext and N. Rows Green TB. 10 reps x 5 cts ea; Inc, NW    Seated: (R) UE ER @ 90/90 in abduction 3-4 lbs 2 sets x 10 reps; Inc, NW    (R) Shoulder repeated extension with table OP x 10 reps; Inc, NW    (R) Shoulder repeated IR with table OP x 10 reps; Inc, NW SciFit UE only L1 Fwd/Bkwd x 3 mins ea; Inc, NW (sore in R shoulder/UE)    (R) Shoulder repeated extension with table OP x 10 reps; Inc, NW    (R) Shoulder repeated IR with table OP x 10 reps; Inc, NW    Eccentric loading into extension and IR with blue TB 10 reps x 5 eccen ct ea; Inc, NW    (B) UE Ext, W. Rows, N. Rows Blue TB. 10 reps x 5 cts ea; Inc, NW    (B) Standing Scaption to 90 deg flexion with 1 lb x 10 reps; NE    (B) Shoulder flexion with resisted abduction yellow TB x 10 reps; NE    Seated: (R) UE ER @ 90/90 in abduction 4 lbs 2 sets x 10 reps; Inc, NW    (R) Shoulder repeated extension with table OP x 10 reps; Inc, NW    (R) Shoulder repeated IR with table OP x 10 reps; Inc, NW        Assessment:   Lizbeth continues to be responding to her (R) shoulder Dp exercises into Extension and IR.  She maintains WNL of AROM in flexion and abduction but remains limited in Ext/IR with end range pain.  She is able to raise her (R) UE, mop the floor, and perform other ADLs with less pain in the (R) UE.  She demonstrated decreased symptoms in all planes of motion upon retesting.  She continues to require repeated cueing to maintain posture/technique with poor carryover.  She was reminded the importance of performing her HEP to improve her condition and verbalized understanding.  All questions answered at this time.    HEP:    - (R) Shoulder repeated extension and IR with table overpressure x 10 reps 2-3x/day  - (R) Shoulder eccentric extension and IR with green or blue  TB 10 reps x 10 eccen ct each 2-3x/day    Goals:   (12 visits)  1. Patient to consistently perform their HEP and it's progression to maintain their improved condition.  2. Patient to have reduced and abolished (R) Shoulder symptoms to be able to reach up and sideways, carry, lift, dress up, clean, and sleep on her sides without producing or increasing symptoms in the (R) Shoulder.    3. Patient to have WNL of (R) Shoulder AROM in all directions with 4/5 MMT in all motions to promote all home, work, recreational,and functional activities without discomfort or deviation.     Plan:   Re-assess next session and continue with (R) Shoulder directional preference exercise, posture correction, strengthening/stability exercises, patient education, and HEP progression.     On this date patient was seen by Stephen Prieto PTA under the supervision of Saji Rollins PT. Cert MDT.

## 2025-02-18 NOTE — TELEPHONE ENCOUNTER
Refill Request    Medication request: sertraline 100 MG Oral Tab. Take 1 tablet (100 mg total) by mouth nightly.      LOV:11/14/2024 Behar, Alex, MD   Due back to clinic per last office note:  She will follow-up with me in about 2 months.   NOV: 4/3/2025 Behar, Alex, [place]      [place]: 11/8/24 #90 (90 days)    Urine drug screen (if applicable): N/A  Pain contract: N/A    LOV plan (if weaning or changing medications): She will let me know also what medications she is taking for pain as she states she is unsure since her daughter gives her this every day. Setraline not mentioned

## 2025-02-26 NOTE — TELEPHONE ENCOUNTER
Current Outpatient Medications:     alendronate 70 MG Oral Tab, Take 1 tablet (70 mg total) by mouth every 7 days., Disp: 12 tablet, Rfl: 3

## 2025-03-06 NOTE — TELEPHONE ENCOUNTER
Pharmacy requesting refill      lisinopril 20 MG Oral Tab, Take 1 tablet (20 mg total) by mouth daily., Disp: 90 tablet, Rfl: 3

## 2025-03-12 NOTE — TELEPHONE ENCOUNTER
Controlled Substance second prescription refill request.      The current prescription for your patient has no refills remaining or is about to .  If you want to continue therapy for your patient, as indicated above, please prepare and fax a valid signed written prescription to the pharmacy in the space below, or authorize a verbal prescription directly to the pharmacist.    Current Outpatient Medications   Medication Sig Dispense Refill                                              pregabalin 100 MG Oral Cap Take 1 capsule (100 mg total) by mouth 2 (two) times daily. 60 capsule 2

## 2025-03-14 NOTE — TELEPHONE ENCOUNTER
Please review; protocol failed/ has no protocol        Recent fills: 01/22/2025,12/20/2024,11/18/2024  Last Rx written: 11/18/2024  Last Office Visit: 12/21/2024    Recent Visits  Date Type Provider Dept   12/21/24 Office Visit Kimmy Talbot MD [facility]     Future Appointments  Date Type Provider Dept   03/21/25 Appointment Kimmy Talbot MD [facility]   05/22/25 Appointment Kimmy Talbot MD [facility]   Showing future appointments within next 150 days with a meds authorizing provider and meeting all other requirements

## 2025-03-17 NOTE — TELEPHONE ENCOUNTER
Refill Request    Medication request: SERTRALINE 100 MG Oral Tab     LOV:11/14/2024 Behar, Alex, MD   Due back to clinic per last office note:  2MONTHS  NOV: 4/3/2025 Behar, Alex, [place]      [place]: 3/2/25 #90    Urine drug screen (if applicable): N/A  Pain contract: N/A    LOV plan (if weaning or changing medications): N/A

## 2025-03-18 NOTE — TELEPHONE ENCOUNTER
Lisinopril 20m year supply sent to [facility] in [place], [place] on 03/10/2025    Alendronate 70m year supply sent to [facility] on 2025    Cinacalcet 30m year supply sent to [facility] on 2024    Pregabalin 100mg: 3 month supply sent to [facility] on 03/15/2025    Diclofenac 75m day supply received on 2025

## 2025-04-13 NOTE — TELEPHONE ENCOUNTER
REFILL REQUEST:   famotidine 20 MG Oral Tab Take 1 tablet (20 mg total) by mouth 2 (two) times daily. 90 tablet 3

## 2025-04-13 NOTE — TELEPHONE ENCOUNTER
REFILL REQUEST:    donepezil 10 MG Oral Tab  Take 1 tablet (10 mg total) by mouth every evening.  90 tablet  3

## 2025-04-15 NOTE — TELEPHONE ENCOUNTER
Lisinopril 20m year supply sent to [facility] in [place], [place] on 03/10/2025-script can be transferred between stores     Donepezil 10mg: duplicate request

## 2025-04-15 NOTE — TELEPHONE ENCOUNTER
Refill Request:    Current Outpatient Medications   Medication Sig Dispense Refill    donepezil 10 MG Oral Tab Take 1 tablet (10 mg total) by mouth every evening. 90 tablet 3      lisinopril 20 MG Oral Tab Take 1 tablet (20 mg total) by mouth daily. 90 tablet 3       Please advise

## 2025-04-17 NOTE — TELEPHONE ENCOUNTER
[employer] Alert Outreach Telephone Call Attempt     Patient is being outreached by the [facility] for a clinical alert from the [employer] monitoring program.     Call Attempt Date: 4/14/2025    Call Attempt: First Attempt    Please review; protocol failed/No Protocol      Last Office Visit: 12/21/2024  Future Appointments   Date Time Provider Department Center   5/22/2025  4:30 PM [facility]

## 2025-05-22 NOTE — PROGRESS NOTES
Subjective:   Lizbeth Matute is a 79 year old female who presents for a Medicare Wellness Visit charge within the last 11 months and Patient may not meet criteria for AWV: Please evaluate for correct coding and scheduled follow up of multiple significant but stable problems.               History/Other:   Fall Risk Assessment:   She has been screened for Falls and is High Risk. Fall Prevention information provided to patient in After Visit Summary.    Do you feel unsteady when standing or walking?: Yes  Do you worry about falling?: Yes  Have you fallen in the past year?: Yes  How many times have you fallen?: 3  Were you injured?: Yes     Cognitive Assessment:   Abnormal  What day of the week is this?: Correct  What month is it?: Correct  What year is it?: Correct  Recall \"Ball\": Incorrect  Recall \"Flag\": Incorrect  Recall \"Tree\": Incorrect    Functional Ability/Status:   Lizbeth Matute has some abnormal functions as listed below:  She has Walking problems based on screening of functional status. She has problems with Memory based on screening of functional status.       Depression Screening (PHQ):  PHQ-9 TOTAL SCORE: 8  , done 5/22/2025   Little interest or pleasure in doing things: 2    Feeling down, depressed, or hopeless: 2    Trouble falling or staying asleep, or sleeping too much: 1     Feeling tired or having little energy: 1    Feeling bad about yourself - or that you are a failure or have let yourself or your family down: 2    If you checked off any problems, how difficult have these problems made it for you to do your work, take care of things at home, or get along with other people?: Not difficult at all            Advanced Directives:   She does have a Living Will but we do NOT have it on file in Epic.    She has a Power of  for Health Care on file in [facility].  Discussed Advance Care Planning with patient (and family/surrogate if present). Standard forms made available to patient in  After Visit Summary.      Problem List[1]  Allergies:  She is allergic to dust mite extract, latex, latex, and seasonal.    Current Medications:  Active Meds, Sig Only[2]    Medical History:  She  has a past medical history of Arthritis, Cancer ([facility]) (2016), Dementia (HCC), Depression, Disorder of thyroid, Esophageal reflux, Eye injury (1973), Fibromyalgia, Hearing difficulty, Hearing impairment, High blood pressure, Hyperlipidemia, Hypertension, Osteoporosis, Osteoporosis, Pericarditis (HCC) (11/20/2024), TIA (transient ischemic attack), Unspecified essential hypertension, and Visual impairment.  Surgical History:  She  has a past surgical history that includes cholecystectomy (2012); colonoscopy; Brain Surgery (10/31/2016); other surgical history; other surgical history; other surgical history; tubal ligation; hysterectomy; knee surgery (Right, 10/2022); and colonoscopy (N/A, 2/15/2023).   Family History:  Her family history includes Arthritis in her father; Cancer in her cousin and mother; Other in her maternal grandfather, maternal grandmother, paternal grandfather, and paternal grandmother.  Social History:  She  reports that she has never smoked. She has never been exposed to tobacco smoke. She has never used smokeless tobacco. She reports that she does not currently use alcohol. She reports that she does not use drugs.    Tobacco:  She has never smoked tobacco.    CAGE Alcohol Screen:   CAGE screening score of 0 on 5/22/2025, showing low risk of alcohol abuse.      Patient Care Team:  Kimmy Talbot MD as PCP - General (Family Medicine)  Ananda Reaves MD as Consulting Physician (NEUROSURGERY)  Reginaldo Whitmore MD as Consulting Physician (NEUROLOGY)  Aziza Whyte RN as Nurse Navigator (ONCOLOGY)  Wil Vinson PT as Physical Therapist (Physical Therapy)  Behar, Alex, MD (Physical Medicine)  Louisa Rendon PTA as Physical Therapy Assistant (Physical Therapy)  Jeremy Alexandre, PT as Physical Therapist  (Physical Therapy)  Veronica Lopez, PT as Physical Therapist (Physical Therapy)    Review of Systems     Negative except PER HPI     Objective:   Physical Exam  General appearance: alert  HEENT: Normocephalic, without obvious abnormality, atraumatic. PERRL, EOMI, pink conjunctiva.   Neck: supple, symmetrical, trachea midline, no adenopathy, no JVD and thyroid not enlarged,  Lungs: respirations unlabored, clear to auscultation bilaterally  Heart: regular rate and rhythm, S1, S2 normal, no murmur  Abdomen: normoactive bowel sounds x4; soft, non-distended; nontender; no masses  Extremities: extremities normal, atraumatic, no cyanosis or edema; no erythema or tenderness in calves bilaterally  Neurologic: alert, oriented x3      /64 (BP Location: Right arm, Patient Position: Sitting, Cuff Size: adult)   Pulse 61   Temp 97 °F (36.1 °C)   Ht 5' (1.524 m)   Wt 135 lb (61.2 kg)   BMI 26.37 kg/m²  Estimated body mass index is 26.37 kg/m² as calculated from the following:    Height as of this encounter: 5' (1.524 m).    Weight as of this encounter: 135 lb (61.2 kg).    Medicare Hearing Assessment:   Hearing Screening    Screening Method: Questionnaire  I have a problem hearing over the telephone: No I have trouble following the conversations when two or more people are talking at the same time: No   I have trouble understanding things on the TV: No I have to strain to understand conversations: No   I have to worry about missing the telephone ring or doorbell: No I have trouble hearing conversations in a noisy background such as a crowded room or restaurant: Yes   I get confused about where sounds come from: Yes I misunderstand some words in a sentence and need to ask people to repeat themselves: Yes   I especially have trouble understanding the speech of women and children: No I have trouble understanding the speaker in a large room such as at a meeting or place of [employer]: No    People get annoyed because I  misunderstand what they say: Yes   I misunderstand what others are saying and make inappropriate responses: No I avoid social activities because I cannot hear well and fear I will reply improperly: No   Family members and friends have told me they think I may have hearing loss: Yes                   Assessment & Plan:   Lizbeth Matute is a 79 year old female who presents for a Medicare Assessment.     1. Encounter for annual health examination  -Medical, surgical and social history, as well as medications and allergies were reviewed with patient.  - CBC With Differential With Platelet; Future  - Comp Metabolic Panel (14); Future  - Hemoglobin A1C; Future  - Lipid Panel; Future  - TSH W Reflex To Free T4; Future    2. Brain tumor (HCC)  - Stable condition, continue present management.      3. CNS lymphoma  - Stable condition, continue present management.  - Stable condition, continue present management.      4. Hyperparathyroidism (HCC)  - Stable condition, continue present management.      5. Moderate episode of recurrent major depressive disorder (HCC)  - Stable condition, continue present management.      6. Neutropenic colitis  - Stable condition, continue present management.      7. Primary CNS lymphoma  - Stable condition, continue present management.      8. Rheumatoid arthritis with negative rheumatoid factor, involving unspecified site (HCC)  - Stable condition, continue present management.      9. Senile dementia, uncomplicated (HCC)  - Stable condition, continue present management.      10. Thrombocytopenia  - Stable condition, continue present management.      11. Mixed hyperlipidemia  - Stable condition, continue present management.      12. Tendinopathy of rotator cuff, unspecified laterality    - Physical Therapy Referral - [place] Locations  - diclofenac 50 MG Oral Tab EC; Take 1 tablet (50 mg total) by mouth 2 (two) times daily as needed.  Dispense: 60 tablet; Refill: 0    13. Chronic pain  of both shoulders    - Physical Therapy Referral - [place] Locations  - diclofenac 50 MG Oral Tab EC; Take 1 tablet (50 mg total) by mouth 2 (two) times daily as needed.  Dispense: 60 tablet; Refill: 0    14. Snoring    - Home Sleep Apnea Test (Adult pt only) - Sleep consult required for Medicare pts  - General sleep study; Future    15. Primary hypertension  - Stable condition, continue present management.      16. Pulmonary nodules  - Stable condition, continue present management.      17. Age-related osteoporosis without current pathological fracture  - Stable condition, continue present management.      18. Goiter  - Stable condition, continue present management.      19. Bulge of lumbar disc without myelopathy  - Stable condition, continue present management.      20. Thoracic spinal stenosis  - Stable condition, continue present management.      21. Primary osteoarthritis of right knee  - Stable condition, continue present management.      22. Anemia, unspecified type  - Stable condition, continue present management.      23. Thoracic spine tumor  - Stable condition, continue present management.      24. Hearing aid consultation  - Stable condition, continue present management.      25. Age-related nuclear cataract of right eye  - Stable condition, continue present management.      26. Eye globe prosthesis  - Stable condition, continue present management.      27. Visual impairment  - Stable condition, continue present management.      28. Difficulty in walking, not elsewhere classified  - Stable condition, continue present management.              The patient indicates understanding of these issues and agrees to the plan.  Reinforced healthy diet, lifestyle, and exercise.      No follow-ups on file.     Kimmy Talbot MD, 5/22/2025     Supplementary Documentation:   General Health:  In the past six months, have you lost more than 10 pounds without trying?: 2 - No  Has your appetite been poor?: No  Type of  Diet: Balanced  How does the patient maintain a good energy level?: Daily Walks  How would you describe your daily physical activity?: Light  How would you describe your current health state?: Fair  How do you maintain positive mental well-being?: Visiting Friends, Visiting Family  On a scale of 0 to 10, with 0 being no pain and 10 being severe pain, what is your pain level?: 8 - (Severe)  In the past six months, have you experienced urine leakage?: 0-No  At any time do you feel concerned for the safety/well-being of yourself and/or your children, in your home or elsewhere?: No  Have you had any immunizations at another office such as Influenza, Hepatitis B, Tetanus, or Pneumococcal?: Yes    Health Maintenance   Topic Date Due    COVID-19 Vaccine (4 - 2024-25 season) 09/01/2024    Zoster Vaccines (2 of 2) 09/30/2024    Annual Physical  12/12/2024    Annual Depression Screening  01/01/2025    Fall Risk Screening (Annual)  01/01/2025    Colorectal Cancer Screening  02/15/2028    Influenza Vaccine  Completed    DEXA Scan  Completed    Pneumococcal Vaccine: 50+ Years  Completed    Meningococcal B Vaccine  Aged Out    Mammogram  Discontinued            [1]   Patient Active Problem List  Diagnosis    Thoracic spine tumor    Thoracic spinal stenosis    Bulge of lumbar disc without myelopathy    Brain tumor (HCC)    Pulmonary nodules    CNS lymphoma    Primary CNS lymphoma    Primary hypertension    Hyperlipidemia    Tendinopathy of rotator cuff    Anemia    Goiter    Hyperparathyroidism (HCC)    Moderate episode of recurrent major depressive disorder (HCC)    Neutropenic colitis    Senile dementia, uncomplicated (HCC)    Thrombocytopenia    Hearing aid consultation    Visual impairment    Age-related osteoporosis without current pathological fracture    Difficulty in walking, not elsewhere classified    Rheumatoid arthritis without rheumatoid factor, unspecified site (HCC)    Primary osteoarthritis of right knee    Eye  globe prosthesis    Age-related nuclear cataract of right eye   [2]   Outpatient Medications Marked as Taking for the 5/22/25 encounter (Office Visit) with Kimmy Talbot MD   Medication Sig    diclofenac 50 MG Oral Tab EC Take 1 tablet (50 mg total) by mouth 2 (two) times daily as needed.    donepezil 10 MG Oral Tab Take 1 tablet (10 mg total) by mouth every evening.    sertraline 100 MG Oral Tab Take 1 tablet (100 mg total) by mouth nightly.    pregabalin 100 MG Oral Cap Take 1 capsule (100 mg total) by mouth 2 (two) times daily.    lisinopril 20 MG Oral Tab Take 1 tablet (20 mg total) by mouth daily.    famotidine 20 MG Oral Tab Take 1 tablet (20 mg total) by mouth 2 (two) times daily.    alendronate 70 MG Oral Tab Take 1 tablet (70 mg total) by mouth every 7 days.    diclofenac 75 MG Oral Tab EC Take 1 tablet (75 mg total) by mouth 2 (two) times daily as needed (con comida).    cinacalcet 30 MG Oral Tab Take 0.5 tablets (15 mg total) by mouth daily.    CALCIUM-VITAMIN D OR Take 1 tablet by mouth daily.    Multiple Vitamins-Minerals (MULTIPLE VITAMINS/WOMENS) Oral Tab Take 1 tablet by mouth daily.    Cholecalciferol (VITAMIN D3) 25 MCG (1000 UT) Oral Cap Take 1 tablet by mouth daily.    diclofenac 0.1 % Ophthalmic Solution Place 1 drop into the left eye daily as needed.    Fexofenadine HCl (ALLERGY 24-HR OR) Take 1 tablet by mouth daily.    diclofenac 1 % External Gel Apply 2 g topically 4 (four) times daily. (Patient taking differently: Apply 2 g topically 4 (four) times daily. To right knee)

## 2025-06-05 NOTE — PROGRESS NOTES
The following individual(s) verbally consented to be recorded using ambient AI listening technology and understand that they can each withdraw their consent to this listening technology at any point by asking the clinician to turn off or pause the recording:    Patient name: ChristianneMani Stahl  Additional names:

## 2025-06-05 NOTE — PROGRESS NOTES
[facility]  Progress Note    CHIEF COMPLAINT:    Chief Complaint   Patient presents with    Follow - Up     LOV 11/14/2024. Pt here for f/u R subacromial CSI and reports 25% improvement. Presents with R shoulder/arm pain. Pain is 4/10. Denies N/T. Admits weakness. Taking tylenol PRN. Denies PT.        History of Present Illness  Lizbeth Matute is a 80 year old female who presents with persistent shoulder and knee pain.    She has ongoing right shoulder pain, previously treated with an injection in November, which provided relief for about two months. The pain has since returned, worsening at night, particularly when lying down, and it disrupts her sleep. She takes pain medication at night, which provides some relief, but she is currently out of her prescription.    She also experiences significant right knee pain, which radiates from the groin down to the knee. This knee was previously operated on about 2 to 3 years ago with a knee replacement by Dr. Hicks she reports a lack of strength and a sensation of instability when walking, feeling as though she might fall.    Additionally, she experiences pain in her back and buttocks. Lying on her right side with her arm under her body helps alleviate the shoulder pain, allowing her to sleep better.       PAST MEDICAL HISTORY:  Past Medical History[1]    SURGICAL HISTORY:  Past Surgical History[2]    SOCIAL HISTORY:   Social History     Occupational History    Not on file   Tobacco Use    Smoking status: Never     Passive exposure: Never    Smokeless tobacco: Never   Vaping Use    Vaping status: Never Used   Substance and Sexual Activity    Alcohol use: Not Currently     Alcohol/week: 0.0 standard drinks of alcohol    Drug use: No    Sexual activity: Not on file       FAMILY HISTORY:   Family History[3]    CURRENT MEDICATIONS:   Current Medications[4]    ALLERGIES:   Allergies[5]    REVIEW OF SYSTEMS:   Review of  Systems   Constitutional: Negative.    HENT: Negative.    Eyes: Negative.    Respiratory: Negative.    Cardiovascular: Negative.    Gastrointestinal: Negative.    Genitourinary: Negative.    Musculoskeletal: As per HPI  Skin: Negative.    Neurological: As per HPI  Endo/Heme/Allergies: Negative.    Psychiatric/Behavioral: Negative.      All other systems reviewed and are negative. Pertinent positives and negatives noted in the HPI.    PHYSICAL EXAM:   /50   Pulse 63   Ht 60\"   Wt 135 lb (61.2 kg)   SpO2 96%   BMI 26.37 kg/m²     Body mass index is 26.37 kg/m².      General: No immediate distress  Head: Normocephalic/ Atraumatic  Eyes: Extra-occular movements intact.   Ears: No auricular hematoma or deformities  Mouth: No lesions or ulcerations  Heart: peripheral pulses intact. Normal capillary refill.   Lungs: Non-labored respirations  Abdomen: No abdominal guarding  Extremities: No lower extremity edema bilaterally   Skin: No lesions noted.   Cognition: alert & oriented x 3, attentive, able to follow 2 step commands, comprehension intact, spontaneous speech intact  Motor:    Musculoskeletal:        Data  [facility] Lab Encounter on 06/04/2025   Component Date Value Ref Range Status    WBC 06/04/2025 4.0  4.0 - 11.0 x10(3) uL Final    RBC 06/04/2025 3.96  3.80 - 5.30 x10(6)uL Final    HGB 06/04/2025 11.8 (L)  12.0 - 16.0 g/dL Final    HCT 06/04/2025 37.4  35.0 - 48.0 % Final    MCV 06/04/2025 94.4  80.0 - 100.0 fL Final    MCH 06/04/2025 29.8  26.0 - 34.0 pg Final    MCHC 06/04/2025 31.6  31.0 - 37.0 g/dL Final    RDW-SD 06/04/2025 44.9  35.1 - 46.3 fL Final    RDW 06/04/2025 13.0  11.0 - 15.0 % Final    PLT 06/04/2025 139.0 (L)  150.0 - 450.0 10(3)uL Final    Immature Platelet Fraction 06/04/2025 3.2  0.0 - 7.0 % Final    Neutrophil Absolute Prelim 06/04/2025 1.92  1.50 - 7.70 x10 (3) uL Final    Neutrophil Absolute 06/04/2025 1.92  1.50 - 7.70 x10(3) uL Final    Lymphocyte Absolute 06/04/2025 1.43  1.00 - 4.00  x10(3) uL Final    Monocyte Absolute 06/04/2025 0.44  0.10 - 1.00 x10(3) uL Final    Eosinophil Absolute 06/04/2025 0.18  0.00 - 0.70 x10(3) uL Final    Basophil Absolute 06/04/2025 0.03  0.00 - 0.20 x10(3) uL Final    Immature Granulocyte Absolute 06/04/2025 0.01  0.00 - 1.00 x10(3) uL Final    Neutrophil % 06/04/2025 47.9  % Final    Lymphocyte % 06/04/2025 35.7  % Final    Monocyte % 06/04/2025 11.0  % Final    Eosinophil % 06/04/2025 4.5  % Final    Basophil % 06/04/2025 0.7  % Final    Immature Granulocyte % 06/04/2025 0.2  % Final    Glucose 06/04/2025 90  70 - 99 mg/dL Final    Sodium 06/04/2025 142  136 - 145 mmol/L Final    Potassium 06/04/2025 4.9  3.5 - 5.1 mmol/L Final    Chloride 06/04/2025 110  98 - 112 mmol/L Final    CO2 06/04/2025 27.0  21.0 - 32.0 mmol/L Final    Anion Gap 06/04/2025 5  0 - 18 mmol/L Final    BUN 06/04/2025 11  9 - 23 mg/dL Final    Creatinine 06/04/2025 1.03 (H)  0.55 - 1.02 mg/dL Final    BUN/CREA Ratio 06/04/2025 10.7  10.0 - 20.0 Final    Calcium, Total 06/04/2025 9.4  8.7 - 10.4 mg/dL Final    Calculated Osmolality 06/04/2025 293  275 - 295 mOsm/kg Final    eGFR-Cr 06/04/2025 55 (L)  >=60 mL/min/1.73m2 Final    ALT 06/04/2025 12  10 - 49 U/L Final    AST 06/04/2025 23  <34 U/L Final    Alkaline Phosphatase 06/04/2025 107  55 - 142 U/L Final    Bilirubin, Total 06/04/2025 0.6  0.2 - 1.1 mg/dL Final    Total Protein 06/04/2025 7.3  5.7 - 8.2 g/dL Final    Albumin 06/04/2025 4.1  3.2 - 4.8 g/dL Final    Globulin  06/04/2025 3.2  2.0 - 3.5 g/dL Final    A/G Ratio 06/04/2025 1.3  1.0 - 2.0 Final    Patient Fasting for CMP? 06/04/2025 Yes   Final    HgbA1C 06/04/2025 5.3  <5.7 % Final    Estimated Average Glucose 06/04/2025 105  68 - 126 mg/dL Final    Cholesterol, Total 06/04/2025 190  <200 mg/dL Final    HDL Cholesterol 06/04/2025 62 (H)  40 - 59 mg/dL Final    Triglycerides 06/04/2025 143  30 - 149 mg/dL Final    LDL Cholesterol 06/04/2025 103 (H)  <100 mg/dL Final    VLDL  06/04/2025 24  0 - 30 mg/dL Final    Non HDL Chol 06/04/2025 128  <130 mg/dL Final    Patient Fasting for Lipid? 06/04/2025 Yes   Final    TSH 06/04/2025 3.599  0.550 - 4.780 uIU/mL Final   ]      Radiology Imaging:  I reviewed with the patient her X-ray of the shoulder right   PROCEDURE: XR SHOULDER, COMPLETE (MIN 2 VIEWS), RIGHT (CPT=73030)     COMPARISON: [facility], XR SHOULDER, COMPLETE (MIN 2 VIEWS), RIGHT (CPT=73030), 10/13/2021, 3:43 PM.     INDICATIONS: Right lateral shoulder pain post fall x1day.     TECHNIQUE: 3 views were obtained.       FINDINGS:  BONES: No acute appearing fracture or dislocation.  Mild narrowing of the glenohumeral joint.  Mild-to-moderate degenerative narrowing of the AC joint.  SOFT TISSUES: Soft tissue calcification superior to the humeral head in the course of the rotator cuff may suggest chronic calcific rotator cuff tendinopathy.  Correlate clinically.  EFFUSION: None visible.  OTHER: Negative.               Impression   CONCLUSION:  1. No acute appearing fracture or dislocation.  Lesser findings as described above.  See above.           Dictated by (CST): Alex Beard MD on 4/11/2024 at 11:08 AM      Finalized by (CST): Alex Beard MD on 4/11/2024 at 11:09 AM             ASSESSMENT AND PLAN:  Lizbeth is a pleasant 80-year-old female presents for follow-up of her right shoulder pain due to rotator cuff calcific tendinopathy, rotator cuff tearing, and glenohumeral osteoarthritis.  I recommended a right subacromial injection which we performed today.  Please see separate procedure note.  She will follow-up with me in about 6 weeks for this.  As a pertains to her knee pain, I have ordered new x-rays of the right knee to evaluate for loosening of her hardware.  We can consider a CT scan if her symptoms persist or we can also consider a diagnostic ultrasound with an aspiration if there is fluid.  She will use Tylenol for pain and follow-up with me in about 6 weeks.      Assessment & Plan  Right shoulder pain with calcification  Chronic pain with calcification in subacromial space, possible impingement syndrome. Previous corticosteroid injection provided temporary relief. Calcification in rotator cuff tendons may require surgery if conservative management fails.  - Administer right subacromial corticosteroid injection for pain relief, expecting 50-60% symptom reduction.  - Discuss potential surgical intervention if conservative management fails.    Right knee pain with prosthesis  Chronic pain with prosthetic joint, possible loosening or mechanical issues. No joint effusion, aspiration not indicated due to infection risk.  - Order X-ray of right knee to evaluate prosthesis and assess for mechanical issues.      RTC in 6 weeks  Discharge Instructions were provided as documented in AVS summary.  The patient was in agreement with the assessment and plan.  All questions were answered.  There were no barriers to learning.         1. Subacromial bursitis of right shoulder joint    2. Tendinopathy of rotator cuff, unspecified laterality    3. Chronic right shoulder pain    4. Chronic pain of both shoulders    5. Acute pain of right knee        Alex B. Behar MD  Physical Medicine and Rehabilitation/Sports Medicine  [address] Cures Act Notice to Patient: Medical documents like this are made available to patients in the interest of transparency. However, be advised this is a medical document and it is intended as [id number] communication between your medical providers. This medical document may contain abbreviations, assessments, medical data, and results or other terms that are unfamiliar. Medical documents are intended to carry relevant information, facts as evident, and the clinical opinion of the practitioner. As such, this medical document may be written in language that appears blunt or direct. You are encouraged to contact your medical provider  and/or [facility] Patient Experience if you have any questions about this medical document.   Abridge tool was used for dictation purposes only and the patient was not recorded at any point during the visit.              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 , PROSTHETIC EYE RT   [2]   Past Surgical History:  Procedure Laterality Date    Brain surgery  10/31/2016    Right frontal craniotomy for tumor resection    Cholecystectomy  2012    Colonoscopy      at [facility] about 8 years ago    Colonoscopy N/A 2/15/2023    Procedure: COLONOSCOPY;  Surgeon: Radha Sr MD;  Location: [facility] ENDOSCOPY    Hysterectomy      Knee surgery Right 10/2022    Other surgical history      Bionic Eye    Other surgical history      lymphoma biopsy    Other surgical history      3 RT hand surgeries for broken fingers     Tubal ligation     [3]   Family History  Problem Relation Age of Onset    Cancer Mother         uterine cancer    Arthritis Father         Rheumatoid    Other (Other) Maternal Grandmother     Other (Other) Maternal Grandfather     Other (Other) Paternal Grandmother     Other (Other) Paternal Grandfather     Cancer Cousin         breast cancer    Diabetes Neg     Glaucoma Neg     Macular degeneration Neg    [4]   Current Outpatient Medications   Medication Sig Dispense Refill    diclofenac 50 MG Oral Tab EC Take 1  tablet (50 mg total) by mouth 2 (two) times daily as needed. 60 tablet 0    donepezil 10 MG Oral Tab Take 1 tablet (10 mg total) by mouth every evening. 90 tablet 3    sertraline 100 MG Oral Tab Take 1 tablet (100 mg total) by mouth nightly. 90 tablet 3    pregabalin 100 MG Oral Cap Take 1 capsule (100 mg total) by mouth 2 (two) times daily. 60 capsule 2    lisinopril 20 MG Oral Tab Take 1 tablet (20 mg total) by mouth daily. 90 tablet 3    famotidine 20 MG Oral Tab Take 1 tablet (20 mg total) by mouth 2 (two) times daily. 90 tablet 3    alendronate 70 MG Oral Tab Take 1 tablet (70 mg total) by mouth every 7 days. 12 tablet 3    diclofenac 75 MG Oral Tab EC Take 1 tablet (75 mg total) by mouth 2 (two) times daily as needed (con comida). 120 tablet 1    cinacalcet 30 MG Oral Tab Take 0.5 tablets (15 mg total) by mouth daily. 45 tablet 3    CALCIUM-VITAMIN D OR Take 1 tablet by mouth daily.      Multiple Vitamins-Minerals (MULTIPLE VITAMINS/WOMENS) Oral Tab Take 1 tablet by mouth daily.      Cholecalciferol (VITAMIN D3) 25 MCG (1000 UT) Oral Cap Take 1 tablet by mouth daily.      diclofenac 0.1 % Ophthalmic Solution Place 1 drop into the left eye daily as needed.      Fexofenadine HCl (ALLERGY 24-HR OR) Take 1 tablet by mouth daily.      diclofenac 1 % External Gel Apply 2 g topically 4 (four) times daily. (Patient taking differently: Apply 2 g topically 4 (four) times daily. To right knee) 150 g 2   [5]   Allergies  Allergen Reactions    Dust Mite Extract Runny nose    Latex RASH     itching    Latex ITCHING     If exposed for long time     Seasonal UNKNOWN

## 2025-06-05 NOTE — PATIENT INSTRUCTIONS
1) My office will call you to schedule the RIGHT subacromial Csi once the procedure is approved by your insurance carrier.    2) Please get X-rays of the RIGHT knee today on your way out.   3) Continue home exercise program   4) Follow up with me in about 6 weeks. If knee pain continues, then will order CT right knee and consider right knee aspiration      Post Injection Instructions     Please do not do anything strenuous over the next two days (if you had a knee injection do not walk more than 2 city blocks, do not attend any aerobic classes, do not run, no heavy lifting, no prolong standing).  You may resume your day to day activities after your injection.  You may experience some mild amount of swelling after the procedure.  Please ice your joint that was injected at least 5-6 times a day (15 minutes) for two days after (this will help prevent worsening pain that sometimes occurs after an injection).  Only take tylenol if needed for pain for the first few days.  Watch for signs of infection which include redness, warmth, worsening pain, fevers or chills.  If you develop any of these signs call the office immediately at [phone]    Everyone responds differently to injections, but you can expect your peak effects a few weeks after your last injection.  Alex B. Behar MD  Physical Medicine and Rehabilitation/Sports Medicine  [facility]

## 2025-06-05 NOTE — PROCEDURES
[facility]   Shoulder Injection Procedure Note    CHIEF COMPLAINT:    Chief Complaint   Patient presents with    Follow - Up     LOV 11/14/2024. Pt here for f/u R subacromial CSI and reports 25% improvement. Presents with R shoulder/arm pain. Pain is 4/10. Denies N/T. Admits weakness. Taking tylenol PRN. Denies PT.        PROCEDURE PERFORMED:  Right subacromial corticosteroid injection     INDICATIONS:  Right shoulder pain    PRIMARY PROCEDURALIST:  Alex Behar, MD    INFORMED CONSENT & TIME OUT:   As documented in the Time Out and Pre-Procedure Check Lists.  Verbal consent was obtained    Vitals: [unfilled]  Labs (document last wbc, plts, hgb, and PT/INR):   [facility] Lab Encounter on 06/04/2025   Component Date Value Ref Range Status    WBC 06/04/2025 4.0  4.0 - 11.0 x10(3) uL Final    RBC 06/04/2025 3.96  3.80 - 5.30 x10(6)uL Final    HGB 06/04/2025 11.8 (L)  12.0 - 16.0 g/dL Final    HCT 06/04/2025 37.4  35.0 - 48.0 % Final    MCV 06/04/2025 94.4  80.0 - 100.0 fL Final    MCH 06/04/2025 29.8  26.0 - 34.0 pg Final    MCHC 06/04/2025 31.6  31.0 - 37.0 g/dL Final    RDW-SD 06/04/2025 44.9  35.1 - 46.3 fL Final    RDW 06/04/2025 13.0  11.0 - 15.0 % Final    PLT 06/04/2025 139.0 (L)  150.0 - 450.0 10(3)uL Final    Immature Platelet Fraction 06/04/2025 3.2  0.0 - 7.0 % Final    Neutrophil Absolute Prelim 06/04/2025 1.92  1.50 - 7.70 x10 (3) uL Final    Neutrophil Absolute 06/04/2025 1.92  1.50 - 7.70 x10(3) uL Final    Lymphocyte Absolute 06/04/2025 1.43  1.00 - 4.00 x10(3) uL Final    Monocyte Absolute 06/04/2025 0.44  0.10 - 1.00 x10(3) uL Final    Eosinophil Absolute 06/04/2025 0.18  0.00 - 0.70 x10(3) uL Final    Basophil Absolute 06/04/2025 0.03  0.00 - 0.20 x10(3) uL Final    Immature Granulocyte Absolute 06/04/2025 0.01  0.00 - 1.00 x10(3) uL Final    Neutrophil % 06/04/2025 47.9  % Final    Lymphocyte % 06/04/2025 35.7  % Final    Monocyte % 06/04/2025 11.0  % Final     Eosinophil % 06/04/2025 4.5  % Final    Basophil % 06/04/2025 0.7  % Final    Immature Granulocyte % 06/04/2025 0.2  % Final    Glucose 06/04/2025 90  70 - 99 mg/dL Final    Sodium 06/04/2025 142  136 - 145 mmol/L Final    Potassium 06/04/2025 4.9  3.5 - 5.1 mmol/L Final    Chloride 06/04/2025 110  98 - 112 mmol/L Final    CO2 06/04/2025 27.0  21.0 - 32.0 mmol/L Final    Anion Gap 06/04/2025 5  0 - 18 mmol/L Final    BUN 06/04/2025 11  9 - 23 mg/dL Final    Creatinine 06/04/2025 1.03 (H)  0.55 - 1.02 mg/dL Final    BUN/CREA Ratio 06/04/2025 10.7  10.0 - 20.0 Final    Calcium, Total 06/04/2025 9.4  8.7 - 10.4 mg/dL Final    Calculated Osmolality 06/04/2025 293  275 - 295 mOsm/kg Final    eGFR-Cr 06/04/2025 55 (L)  >=60 mL/min/1.73m2 Final    ALT 06/04/2025 12  10 - 49 U/L Final    AST 06/04/2025 23  <34 U/L Final    Alkaline Phosphatase 06/04/2025 107  55 - 142 U/L Final    Bilirubin, Total 06/04/2025 0.6  0.2 - 1.1 mg/dL Final    Total Protein 06/04/2025 7.3  5.7 - 8.2 g/dL Final    Albumin 06/04/2025 4.1  3.2 - 4.8 g/dL Final    Globulin  06/04/2025 3.2  2.0 - 3.5 g/dL Final    A/G Ratio 06/04/2025 1.3  1.0 - 2.0 Final    Patient Fasting for CMP? 06/04/2025 Yes   Final    HgbA1C 06/04/2025 5.3  <5.7 % Final    Estimated Average Glucose 06/04/2025 105  68 - 126 mg/dL Final    Cholesterol, Total 06/04/2025 190  <200 mg/dL Final    HDL Cholesterol 06/04/2025 62 (H)  40 - 59 mg/dL Final    Triglycerides 06/04/2025 143  30 - 149 mg/dL Final    LDL Cholesterol 06/04/2025 103 (H)  <100 mg/dL Final    VLDL 06/04/2025 24  0 - 30 mg/dL Final    Non HDL Chol 06/04/2025 128  <130 mg/dL Final    Patient Fasting for Lipid? 06/04/2025 Yes   Final    TSH 06/04/2025 3.599  0.550 - 4.780 uIU/mL Final   ]    PROCEDURE:    The risks and benefits of intraarticular corticosteroid injection were again explained to the patient and verbal consent was obtained. The Right shoulder was prepped and draped in the usual sterile fashion. A 25 G  1.5-inch needle was introduced into the Right subacromial space in a posterolateral approach while injecting 1.5 cc of 1% lidocain. Aspiration was attempted and there was no fluid able to be aspirated. We switched the syringe to one containing 4 mL of 1% lidocaine and 1 mL of 40 mg/mL Kenalog and it was injected.  The needle was then removed and hemostasis was obtained.  The skin site was cleansed and a clean dressing was applied.  The patient tolerated the procedure well.     Patient verbalized understanding of assessment and plan.  Patient is in agreement with the plan.  All questions were answered.  No barriers to learning identified.       INSTRUCTIONS GIVEN TO PATIENT:    \"You will see an effect in the next 2-3 days.  Please contact me if you have fevers, worsening swelling, worsening pain, decreased range of motion, increased redness, chills, or anything that makes you concerned about how the joint we injected feels/looks.  If you do not reach me in a reasonable time, please report directly to the emergency room for further evaluation\"      Alex B. Behar MD, [facility]  Physical Medicine and Rehabilitation/Sports Medicine  [facility]

## 2025-06-09 NOTE — TELEPHONE ENCOUNTER
Per CMS Guidelines- no authorization is required for Right Subacromial Injection.   CPT: 67836,     Status: authorization is not required based on medical necessity however may by subject to review once claim is submitted.     Injection done in office.

## 2025-06-20 NOTE — TELEPHONE ENCOUNTER
Patient dtr calling for refill of medication, she is saying she reached out to pharmacy a week ago and they said they sent us request. Advised Dtr, we don't have request, only received this today at 12:07    Patient is out of medication, advised of 3 business day turn around time generally but advised I would request urgent since out    Please review and advise.

## 2025-06-20 NOTE — TELEPHONE ENCOUNTER
Please review: medication fails/has no protocol attached.  Medication request is marked high priority: per patient out of medication    No future appointments with primary care medicine   Last office visit: 5/22/25    Recent fill dates: 5/19/25, 4/14/25, and 3/15/25  Date of  last written prescription: 3/15/25   Last dispensed quantity: #60 each and processed as a 30 day supply

## 2025-07-17 NOTE — PATIENT INSTRUCTIONS
1) Please call and schedule your CT of the RIGHT knee at [phone].  Once you have your CT scheduled, then call my office again to schedule a follow-up visit soon after your CT so we may review the images together.  2) Continue home exercise program   3) We can consider repeating the right subacromial injection after 9/5/2025.   4)  Ice 20 minutes at a time 3-4 times per day  5) Use Voltaren gel or Purchase over the counter Salonpas 4% lidoderm patch maximum strength and place over the right knee or shoulder

## 2025-07-17 NOTE — PROGRESS NOTES
[facility]  Progress Note    CHIEF COMPLAINT:    Chief Complaint   Patient presents with    Follow - Up     LOV 06/05/2025. Pt here for f/u on R subacromial CSI and reports 100% improvement. Pt presents with R knee pain. Pain is 4/10. Admits weakness. Denies pain medications. Denies PT. XR R knee 6/5/25.       History of Present Illness  Lizbeth Matute is a 80 year old female with a history of right knee replacement who presents with persistent right knee pain.    She experiences right knee pain that varies in intensity, sometimes mild and at other times severe. The pain is exacerbated by walking and is associated with swelling and inflammation. She notes that the pain was more intense yesterday, but currently it is not as severe.    For pain management, she uses Voltaren gel and Tylenol as needed, although she prefers to avoid taking too much medication. She also uses ice and Salonpas patches for relief.    As a pertains to her right shoulder pain, she is status post right subacromial injection on 6/5/2025 with 100% improvement in her symptoms.  She denies any radicular symptoms.       PAST MEDICAL HISTORY:  Past Medical History[1]    SURGICAL HISTORY:  Past Surgical History[2]    SOCIAL HISTORY:   Social History     Occupational History    Not on file   Tobacco Use    Smoking status: Never     Passive exposure: Never    Smokeless tobacco: Never   Vaping Use    Vaping status: Never Used   Substance and Sexual Activity    Alcohol use: Not Currently     Alcohol/week: 0.0 standard drinks of alcohol    Drug use: No    Sexual activity: Not on file       FAMILY HISTORY:   Family History[3]    CURRENT MEDICATIONS:   Current Medications[4]    ALLERGIES:   Allergies[5]    REVIEW OF SYSTEMS:   Review of Systems   Constitutional: Negative.    HENT: Negative.    Eyes: Negative.    Respiratory: Negative.    Cardiovascular: Negative.    Gastrointestinal: Negative.     Genitourinary: Negative.    Musculoskeletal: As per HPI  Skin: Negative.    Neurological: As per HPI  Endo/Heme/Allergies: Negative.    Psychiatric/Behavioral: Negative.      All other systems reviewed and are negative. Pertinent positives and negatives noted in the HPI.    PHYSICAL EXAM:   /52   Pulse 57   Ht 60\"   Wt 135 lb (61.2 kg)   SpO2 97%   BMI 26.37 kg/m²     Body mass index is 26.37 kg/m².      General: No immediate distress  Head: Normocephalic/ Atraumatic  Eyes: Extra-occular movements intact.   Ears: No auricular hematoma or deformities  Mouth: No lesions or ulcerations  Heart: peripheral pulses intact. Normal capillary refill.   Lungs: Non-labored respirations  Abdomen: No abdominal guarding  Extremities: No lower extremity edema bilaterally   Skin: No lesions noted.   Cognition: alert & oriented x 3, attentive, able to follow 2 step commands, comprehension intact, spontaneous speech intact  Motor:    Musculoskeletal:        Data  EE Lab Encounter on 06/04/2025   Component Date Value Ref Range Status    WBC 06/04/2025 4.0  4.0 - 11.0 x10(3) uL Final    RBC 06/04/2025 3.96  3.80 - 5.30 x10(6)uL Final    HGB 06/04/2025 11.8 (L)  12.0 - 16.0 g/dL Final    HCT 06/04/2025 37.4  35.0 - 48.0 % Final    MCV 06/04/2025 94.4  80.0 - 100.0 fL Final    MCH 06/04/2025 29.8  26.0 - 34.0 pg Final    MCHC 06/04/2025 31.6  31.0 - 37.0 g/dL Final    RDW-SD 06/04/2025 44.9  35.1 - 46.3 fL Final    RDW 06/04/2025 13.0  11.0 - 15.0 % Final    PLT 06/04/2025 139.0 (L)  150.0 - 450.0 10(3)uL Final    Immature Platelet Fraction 06/04/2025 3.2  0.0 - 7.0 % Final    Neutrophil Absolute Prelim 06/04/2025 1.92  1.50 - 7.70 x10 (3) uL Final    Neutrophil Absolute 06/04/2025 1.92  1.50 - 7.70 x10(3) uL Final    Lymphocyte Absolute 06/04/2025 1.43  1.00 - 4.00 x10(3) uL Final    Monocyte Absolute 06/04/2025 0.44  0.10 - 1.00 x10(3) uL Final    Eosinophil Absolute 06/04/2025 0.18  0.00 - 0.70 x10(3) uL Final    Basophil  Absolute 06/04/2025 0.03  0.00 - 0.20 x10(3) uL Final    Immature Granulocyte Absolute 06/04/2025 0.01  0.00 - 1.00 x10(3) uL Final    Neutrophil % 06/04/2025 47.9  % Final    Lymphocyte % 06/04/2025 35.7  % Final    Monocyte % 06/04/2025 11.0  % Final    Eosinophil % 06/04/2025 4.5  % Final    Basophil % 06/04/2025 0.7  % Final    Immature Granulocyte % 06/04/2025 0.2  % Final    Glucose 06/04/2025 90  70 - 99 mg/dL Final    Sodium 06/04/2025 142  136 - 145 mmol/L Final    Potassium 06/04/2025 4.9  3.5 - 5.1 mmol/L Final    Chloride 06/04/2025 110  98 - 112 mmol/L Final    CO2 06/04/2025 27.0  21.0 - 32.0 mmol/L Final    Anion Gap 06/04/2025 5  0 - 18 mmol/L Final    BUN 06/04/2025 11  9 - 23 mg/dL Final    Creatinine 06/04/2025 1.03 (H)  0.55 - 1.02 mg/dL Final    BUN/CREA Ratio 06/04/2025 10.7  10.0 - 20.0 Final    Calcium, Total 06/04/2025 9.4  8.7 - 10.4 mg/dL Final    Calculated Osmolality 06/04/2025 293  275 - 295 mOsm/kg Final    eGFR-Cr 06/04/2025 55 (L)  >=60 mL/min/1.73m2 Final    ALT 06/04/2025 12  10 - 49 U/L Final    AST 06/04/2025 23  <34 U/L Final    Alkaline Phosphatase 06/04/2025 107  55 - 142 U/L Final    Bilirubin, Total 06/04/2025 0.6  0.2 - 1.1 mg/dL Final    Total Protein 06/04/2025 7.3  5.7 - 8.2 g/dL Final    Albumin 06/04/2025 4.1  3.2 - 4.8 g/dL Final    Globulin  06/04/2025 3.2  2.0 - 3.5 g/dL Final    A/G Ratio 06/04/2025 1.3  1.0 - 2.0 Final    Patient Fasting for CMP? 06/04/2025 Yes   Final    HgbA1C 06/04/2025 5.3  <5.7 % Final    Estimated Average Glucose 06/04/2025 105  68 - 126 mg/dL Final    Cholesterol, Total 06/04/2025 190  <200 mg/dL Final    HDL Cholesterol 06/04/2025 62 (H)  40 - 59 mg/dL Final    Triglycerides 06/04/2025 143  30 - 149 mg/dL Final    LDL Cholesterol 06/04/2025 103 (H)  <100 mg/dL Final    VLDL 06/04/2025 24  0 - 30 mg/dL Final    Non HDL Chol 06/04/2025 128  <130 mg/dL Final    Patient Fasting for Lipid? 06/04/2025 Yes   Final    TSH 06/04/2025 3.599  0.550 -  4.780 uIU/mL Final   ]      Radiology Imaging:  I reviewed with the patient her X-ray of the knees right   XR KNEE, COMPLETE (4 OR MORE VIEWS), RIGHT (MED=[id number])  Narrative: PROCEDURE: XR KNEE, COMPLETE (4 OR MORE VIEWS), RIGHT (CPT=73564)     COMPARISON: [facility] 2nd Floor, XR KNEE (3 VIEWS), RIGHT (CPT=73562), 5/16/2024, 4:29 PM.  [facility], XR KNEE (1 OR 2 VIEWS), RIGHT (CPT=73560), 3/09/2024, 4:44 PM.  [facility] 2nd   Floor, XR KNEE (3 VIEWS), RIGHT (CPT=73562), 8/17/2023, 4:28 PM.     INDICATIONS: Right anterior knee pain x6 months. History of right knee surgery x2 years ago.     TECHNIQUE: 4 views were obtained.       FINDINGS:   BONES: Intact total right knee arthroplasty.  No acute fracture or malalignment.  SOFT TISSUES: Negative. No visible soft tissue swelling.   EFFUSION: None visible.   OTHER: Negative.               Impression: CONCLUSION:      Intact total right knee arthroplasty.           Dictated by (CST): Franco Abdullahi MD on 6/10/2025 at 10:32 AM       Finalized by (CST): Franco Abdullahi MD on 6/10/2025 at 10:32 AM              ASSESSMENT AND PLAN:  Lizbeth is a pleasant 80-year-old female who presents for follow-up of her right shoulder pain due to subacromial and subdeltoid bursitis with impingement syndrome.  I am recommending she continue her home exercise program and use Voltaren gel as well as lidocaine patches for her pain.  As a pertains to the right knee pain, she does have a prosthetic knee.  I am recommending a CT scan of the right knee to further investigate her symptoms as a possibility of loosening of her prosthesis.  She can use ice and Voltaren gel in the meantime.  She will follow-up with me to review the images and discuss next steps.     Assessment & Plan  Chronic right shoulder pain  Chronic right shoulder pain with intermittent exacerbations.  - Continue home exercises for shoulder.  - Schedule potential  shoulder injection for September 5 if needed.    Acute pain of right knee  Intermittent acute pain in the right knee, exacerbated by activity and associated with swelling. Concern for potential prosthesis loosening.  - Order CT scan of right knee to evaluate prosthesis stability.  - Continue using Voltaren gel and Tylenol as needed for pain management.  - Use ice and Salonpas patches for additional pain relief.      RTC in 1 month  Discharge Instructions were provided as documented in AVS summary.  The patient was in agreement with the assessment and plan.  All questions were answered.  There were no barriers to learning.         1. Acute pain of right knee    2. History of total right knee replacement    3. Subacromial bursitis of right shoulder joint    4. Tendinopathy of rotator cuff, unspecified laterality    5. Chronic right shoulder pain    6. Myalgia        Alex B. Behar MD  Physical Medicine and Rehabilitation/Sports Medicine  [facility]  [employer] Cures Act Notice to Patient: Medical documents like this are made available to patients in the interest of transparency. However, be advised this is a medical document and it is intended as [id number] communication between your medical providers. This medical document may contain abbreviations, assessments, medical data, and results or other terms that are unfamiliar. Medical documents are intended to carry relevant information, facts as evident, and the clinical opinion of the practitioner. As such, this medical document may be written in language that appears blunt or direct. You are encouraged to contact your medical provider and/or [facility] if you have any questions about this medical document.   [employer] tool was used for dictation purposes only and the patient was not recorded at any point during the visit.              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 , PROSTHETIC EYE RT   [2]   Past Surgical History:  Procedure Laterality Date    Brain surgery  10/31/2016    Right frontal craniotomy for tumor resection    Cholecystectomy  2012    Colonoscopy      at [facility] about 8 years ago    Colonoscopy N/A 2/15/2023    Procedure: COLONOSCOPY;  Surgeon: Radha Sr MD;  Location: [facility] ENDOSCOPY    Hysterectomy      Knee surgery Right 10/2022    Other surgical history      Bionic Eye    Other surgical history      lymphoma biopsy    Other surgical history      3 RT hand surgeries for broken fingers     Tubal ligation     [3]   Family History  Problem Relation Age of Onset    Cancer Mother         uterine cancer    Arthritis Father         Rheumatoid    Other (Other) Maternal Grandmother     Other (Other) Maternal Grandfather     Other (Other) Paternal Grandmother     Other (Other) Paternal Grandfather     Cancer Cousin         breast cancer    Diabetes Neg     Glaucoma Neg     Macular degeneration Neg    [4]   Current Outpatient Medications   Medication Sig Dispense Refill    pregabalin 100 MG Oral Cap Take 1 capsule (100 mg total) by mouth 2 (two) times daily. 60 capsule 0    donepezil 10 MG Oral Tab Take 1 tablet (10 mg total) by mouth every evening. 90 tablet 3    sertraline 100 MG Oral Tab Take 1 tablet (100 mg total) by mouth nightly. 90 tablet 3    lisinopril 20 MG Oral Tab Take 1 tablet (20 mg total) by mouth  daily. 90 tablet 3    famotidine 20 MG Oral Tab Take 1 tablet (20 mg total) by mouth 2 (two) times daily. 90 tablet 3    alendronate 70 MG Oral Tab Take 1 tablet (70 mg total) by mouth every 7 days. 12 tablet 3    cinacalcet 30 MG Oral Tab Take 0.5 tablets (15 mg total) by mouth daily. 45 tablet 3    CALCIUM-VITAMIN D OR Take 1 tablet by mouth daily.      Multiple Vitamins-Minerals (MULTIPLE VITAMINS/WOMENS) Oral Tab Take 1 tablet by mouth daily.      Cholecalciferol (VITAMIN D3) 25 MCG (1000 UT) Oral Cap Take 1 tablet by mouth daily.      diclofenac 0.1 % Ophthalmic Solution Place 1 drop into the left eye daily as needed.      Fexofenadine HCl (ALLERGY 24-HR OR) Take 1 tablet by mouth daily.      diclofenac 1 % External Gel Apply 2 g topically 4 (four) times daily. (Patient taking differently: Apply 2 g topically 4 (four) times daily. To right knee) 150 g 2   [5]   Allergies  Allergen Reactions    Dust Mite Extract Runny nose    Latex RASH     itching    Latex ITCHING     If exposed for long time     Seasonal UNKNOWN

## 2025-07-22 NOTE — ED INITIAL ASSESSMENT (HPI)
Patient presents to ED with headaches, shakiness, and dizziness starting this morning when she woke up. Patient has left sided weakness.

## 2025-07-22 NOTE — ED PROVIDER NOTES
Patient Seen in: [facility]    History     Chief Complaint   Patient presents with    Dizziness     Stated Complaint: high blood pressure    HPI    80-year-old female presents with chief complaint of dizziness.  Onset today.  Patient describes dizziness as \"when I stand I feel like I am going to fall\".  Patient reports associated headache.  Patient states she also noticed increased weakness of her left upper extremity that began today.  Patient denies injury or trauma, neck pain or injury, loss of consciousness, altered mental status, nausea, vomiting, amnesia, paresthesias, decreased range of motion of extremities, chest pain, dyspnea.    Past Medical History[1]    Past Surgical History[2]         Family History[3]    Short Social Hx on File[4]    Review of Systems    Positive for stated complaint: high blood pressure  Other systems are as noted in HPI.  Constitutional and vital signs reviewed.      All other systems reviewed and negative except as noted above.    PSFH elements reviewed from today and agreed except as otherwise stated in HPI.    Physical Exam     ED Triage Vitals [07/22/25 1726]   /57   Pulse 78   Resp 20   Temp 98.8 °F (37.1 °C)   Temp src Oral   SpO2 97 %   O2 Device None (Room air)       Current:/57   Pulse 78   Temp 98.8 °F (37.1 °C) (Oral)   Resp 20   SpO2 97%      PULSE OX within normal limits on room air as interpreted by this provider.    Constitutional: The patient is cooperative. Appears well-developed and well-nourished.  Mild discomfort.  Psychological: Alert, No abnormalities of mood, affect.  Head: Normocephalic/atraumatic.   Eyes: Right pupil reactive to light.  Right conjunctiva within normal limits.  Left eye globe prosthetic present.    ENT: Oropharynx is clear.  Neck: The neck is supple.  Nontender.  No meningeal signs.  Chest: There is no tenderness to the chest wall.  No CVA tenderness bilaterally.  Respiratory: Respiratory effort was normal.   There is no rales, wheezes, or rhonchi.  There is no stridor.  Air entry is equal.  Cardiovascular: Regular rate and rhythm.  Capillary refill is brisk.    Genitourinary: Not examined.  Lymphatic: No gross lymphadenopathy noted.  Musculoskeletal: Musculoskeletal system is grossly intact.  There is no obvious deformity.  Neurological: No facial asymmetry.  Normal gait.  Normal sensory exam.  Patient exhibits normal speech.  Strength and range of motion symmetrical of all extremities x4.  Skin: Skin is normal to inspection.  Warm and dry.  No obvious bruising.  No obvious rash.    ED Course   Labs Reviewed - No data to display  EKG    Rate, intervals and axes as noted on EKG Report.  Rate: 87  Rhythm: Sinus Rhythm  Reading: No STEMI    When compared to EKG of 11/20/2024, no acute change.           MDM     Physical exam remained stable as previously documented.  Physical exam findings discussed with patient.    80-year-old female presents with chief complaint of dizziness, headache and left-sided weakness that began today.  Physical exam as documented above.  Discussed with patient need for further evaluation and possible workup in the emergency department.  Patient is agreeable.    The patient was informed of their elevated blood pressure reading at immediate care.  They were informed of the dangers of undiagnosed and untreated hypertension.  Education regarding lifestyle modifications and the need for appropriate follow-up with their PCP to have their blood pressure re-checked within 24-48 hours was provided.    Patient case discussed with Dr. Ocasio.    Disposition and Plan     Clinical Impression:  1. Dizziness    2. Left-sided weakness    3. Acute nonintractable headache, unspecified headache type    4. Elevated blood pressure reading        Disposition:  Ic to ed    Follow-up:  No follow-up provider specified.    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 , PROSTHETIC EYE RT   [2]   Past Surgical History:  Procedure Laterality Date    Brain surgery  10/31/2016    Right frontal craniotomy for tumor resection    Cholecystectomy  2012    Colonoscopy      at [facility] about 8 years ago    Colonoscopy N/A 2/15/2023    Procedure: COLONOSCOPY;  Surgeon: Radha Sr MD;  Location: [facility] ENDOSCOPY    Hysterectomy      Knee surgery Right 10/2022    Other surgical history      Bionic Eye    Other surgical history      lymphoma biopsy    Other surgical history      3 RT hand surgeries for broken fingers     Tubal ligation     [3]   Family History  Problem Relation Age of Onset    Cancer Mother         uterine cancer    Arthritis Father         Rheumatoid    Other (Other) Maternal Grandmother     Other (Other) Maternal Grandfather     Other (Other) Paternal Grandmother     Other (Other) Paternal Grandfather     Cancer Cousin         breast cancer    Diabetes Neg     Glaucoma Neg     Macular degeneration Neg    [4]   Social History  Socioeconomic History    Marital status:    Tobacco Use    Smoking status: Never     Passive exposure: Never    Smokeless tobacco: Never   Vaping Use    Vaping status: Never Used   Substance and Sexual Activity    Alcohol use: Not Currently     Alcohol/week: 0.0 standard drinks of alcohol    Drug use: No   Other Topics  Concern    Caffeine Concern No     Comment: Coffee 1 cup daily    Sleep Concern No    Exercise Yes   Social History Narrative    The patient does not use an assistive device..      The patient does live in a home with stairs.     Social Drivers of Health     Food Insecurity: No Food Insecurity (11/20/2024)    Food Insecurity     Food Insecurity: Never true   Transportation Needs: No Transportation Needs (11/25/2024)    Transportation Needs     Lack of Transportation: No   Housing Stability: Low Risk  (11/20/2024)    Housing Stability     Housing Instability: No

## 2025-07-22 NOTE — ED INITIAL ASSESSMENT (HPI)
Dizziness, generalized weakness, headache today, no cp, no sob, no n/v, no blurry vision, speech clear, no facial droop, equal hand grasps

## 2025-07-23 NOTE — ED PROVIDER NOTES
Patient Seen in: [facility] Emergency Department        History  Chief Complaint   Patient presents with    Stroke     Stated Complaint: weakness to left side since this am, dizziness    Subjective:   HPI            The patient is a 80-year-old female with a history of rheumatoid arthritis, lymphoma, dementia, hypertension, TIA who woke up this morning with dizziness feeling off balance.  She felt like she had to hold on so she would not fall.  No nausea or vomiting.  No vision or speech changes.  She then developed heavy sensation to her left arm.  No change in speech per her daughter.  She still states that her left side feels heavy and different than her right side.  She feels shaky.  No recent illness cough fevers or chills.  Daughter was with her yesterday and she felt completely normal.  Mild headache but was worse earlier.  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 , PROSTHETIC EYE RT              Past Surgical History:   Procedure Laterality Date    Brain surgery  10/31/2016    Right frontal craniotomy for tumor resection    Cholecystectomy  2012    Colonoscopy      at [facility] about 8 years ago    Colonoscopy N/A 2/15/2023    Procedure: COLONOSCOPY;  Surgeon: Radha Sr MD;  Location: [facility] ENDOSCOPY    Hysterectomy      Knee surgery Right 10/2022     Other surgical history      Bionic Eye    Other surgical history      lymphoma biopsy    Other surgical history      3 RT hand surgeries for broken fingers     Tubal ligation                  Social History     Socioeconomic History    Marital status:    Tobacco Use    Smoking status: Never     Passive exposure: Never    Smokeless tobacco: Never   Vaping Use    Vaping status: Never Used   Substance and Sexual Activity    Alcohol use: Not Currently     Alcohol/week: 0.0 standard drinks of alcohol    Drug use: No   Other Topics Concern    Caffeine Concern No     Comment: Coffee 1 cup daily    Sleep Concern No    Exercise Yes   Social History Narrative    The patient does not use an assistive device..      The patient does live in a home with stairs.     Social Drivers of Health     Food Insecurity: No Food Insecurity (11/20/2024)    Food Insecurity     Food Insecurity: Never true   Transportation Needs: No Transportation Needs (11/25/2024)    Transportation Needs     Lack of Transportation: No   Housing Stability: Low Risk  (11/20/2024)    Housing Stability     Housing Instability: No                                Physical Exam    ED Triage Vitals   BP 07/22/25 1830 117/53   Pulse 07/22/25 1830 73   Resp 07/22/25 1830 16   Temp 07/22/25 1830 98 °F (36.7 °C)   Temp src 07/22/25 1830 Temporal   SpO2 07/22/25 1830 96 %   O2 Device 07/22/25 1900 None (Room air)       Current Vitals:   Vital Signs  BP: 128/55  Pulse: 70  Resp: 14  Temp: 98 °F (36.7 °C)  Temp src: Temporal  MAP (mmHg): 77    Oxygen Therapy  SpO2: 95 %  O2 Device: None (Room air)            Physical Exam  Vitals and nursing note reviewed.   Constitutional:       General: She is not in acute distress.     Appearance: Normal appearance. She is well-developed. She is not ill-appearing.   HENT:      Head: Normocephalic and atraumatic.      Mouth/Throat:      Mouth: Mucous membranes are moist.   Eyes:      General: No visual field deficit (Glass eye  left).     Extraocular Movements: Extraocular movements intact.      Conjunctiva/sclera: Conjunctivae normal.      Comments: Injury to left eye years ago left glass eye   Neck:      Vascular: No JVD.   Cardiovascular:      Rate and Rhythm: Normal rate and regular rhythm.      Heart sounds: Normal heart sounds. No murmur heard.  Pulmonary:      Effort: Pulmonary effort is normal.      Breath sounds: Normal breath sounds.   Abdominal:      General: Bowel sounds are normal. There is no distension.      Palpations: Abdomen is soft. There is no mass.      Tenderness: There is no abdominal tenderness. There is no guarding or rebound.   Musculoskeletal:         General: Normal range of motion.      Cervical back: Normal range of motion and neck supple.   Skin:     General: Skin is warm and dry.      Capillary Refill: Capillary refill takes less than 2 seconds.      Findings: No rash.   Neurological:      General: No focal deficit present.      Mental Status: She is alert and oriented to person, place, and time.      Cranial Nerves: No cranial nerve deficit, dysarthria or facial asymmetry.      Sensory: No sensory deficit.      Motor: No weakness.      Coordination: Romberg sign negative. Coordination normal.      Gait: Gait normal.      Deep Tendon Reflexes: Reflexes are normal and symmetric.   Psychiatric:         Mood and Affect: Mood is anxious.         Judgment: Judgment normal.       Differential diagnosis includes central versus peripheral vertigo, arrhythmia, stroke, TIA, electrolyte abnormality        ED Course  Labs Reviewed   CBC WITH DIFFERENTIAL WITH PLATELET - Abnormal; Notable for the following components:       Result Value    RBC 3.79 (*)     HGB 11.6 (*)     .0 (*)     All other components within normal limits   COMP METABOLIC PANEL (14) - Abnormal; Notable for the following components:    Glucose 114 (*)     BUN 8 (*)     BUN/CREA Ratio 8.2 (*)     eGFR-Cr 59 (*)     All other components within  normal limits   POCT GLUCOSE - Abnormal; Notable for the following components:    POC Glucose  112 (*)     All other components within normal limits   RAINBOW DRAW BLUE     EKG    Rate, intervals and axes as noted on EKG Report.  Rate: 74  Rhythm: Sinus Rhythm  Reading: No ST elevation or arrhythmia                                MDM     Pulse ox 95% on room air, normal        Medical Decision Making  Patient feels better after meclizine able to get out of bed and get dressed without assistance  Feeling of arm heaviness has resolved and even when patient complained of heaviness there was no weakness or change in sensation on exam  Negative CT CTA and MRI fast scan for acute stroke  Will get follow-up for neuro clinic  Family states that caregivers with her will make sure that she does not fall from being off balanced  Will return if symptoms worsen  Vital signs and neuroexam intact prior to discharge    Problems Addressed:  Paresthesias: acute illness or injury  Vertigo: acute illness or injury    Amount and/or Complexity of Data Reviewed  External Data Reviewed: notes.     Details: From PCP visit on 5/22/2025 reviewed regarding patient's baseline history and meds  Labs: ordered.  Radiology: ordered and independent interpretation performed.     Details: No intracranial hemorrhage other results per radiologist  ECG/medicine tests: ordered and independent interpretation performed. Decision-making details documented in ED Course.    Risk  Prescription drug management.  Risk Details: Dosage and side effect discussed with patient        Disposition and Plan     Clinical Impression:  1. Vertigo    2. Paresthesias         Disposition:  Discharge  7/22/2025  9:09 pm    Follow-up:  Kimmy Talbot MD  [address] [place] [address]  [id number]  [facility] [address]  [phone]    Schedule an appointment as soon as possible for a visit      [facility], [place] [address] [place] [address] [place] [address]  [id number] [phone]  Call  choose option 1 for general neurology    We recommend that you schedule follow up care with a primary care provider within the next three months to obtain basic health screening including reassessment of your blood pressure.      Medications Prescribed:  Current Discharge Medication List        START taking these medications    Details   meclizine 25 MG Oral Tab Take 1 tablet (25 mg total) by mouth every 8 (eight) hours as needed for Dizziness.  Qty: 20 tablet, Refills: 0                   Supplementary Documentation:            TNK/ NI Documentation:    Date/Time last known well:   7/22/2025 4:59 AM    NIHSS on presentation: 0    TNK not given as normal neuroexam on arrival

## 2025-07-23 NOTE — PROGRESS NOTES
25 0902   FER Assessment   Assessment Type FER Initial   Assessment completed with Patient   Patient Subjective Spoke with patient's daughter, Maddy, for initial assessment Transitions of Care navigation call. She reports patient feels great today.  Patient reports the following symptoms all symptoms resolved.   Patient denies the following symptoms:  weakness, off balance, speech slurred/difficult to speak, fever, chills, vision changes, headache, dizziness, chest pain, shortness of breath, abdominal pain, nausea or vomiting. Medications prescribed at discharge have not been picked up but daughter will pick them up today. Remaining medications reviewed. Daughter requested refill on pregablin, patient will take last two doses today.  Telephone encounter sent to primary care physician clinical team regarding this.  Patient has follow up appointment with primary care physician  on 25.  Message sent to [facility] to help coordinate appointment with neurology.  Daughter  was instructed to take patient  to emergency room or call 911 if she begins experiencing:  (x)  Chest pain     (x)  Confusion    (x)  Shortness of breath    (x)   Slurred/difficulty with speech  (x)  Vision changes     ( )  Weakness of upper/lower extremities   (x)  Severe headache     (x)  Severe dizziness  Patient did not have any additional questions or concerns.   Chief Complaint vertigo, paresthesias   FER Navigation Initial Assessment   Verify patient name and  with patient/ caregiver Yes   Tell me what you understand of why you were in the hospital or emergency department She called daughter yesterday and said she had been feeling dizzy all day and headache off and on and symptoms were not going away   Prior to leaving the hospital were your Discharge Instructions reviewed with you? Yes   Did you receive a copy of your written Discharge Instructions? Yes   What questions do you have about your Discharge Instructions? Daughter did not  have any additional questions   Do you feel better or worse since you left the hospital or emergency department? Better   Do you have a follow-up appointment? No   Were you able to schedule the appt? Scheduled   Are there any barriers to getting to your follow-up appointment? No   Prior to leaving the hospital was Home Health (HH) arranged for you? N/A   Are HH needs identified by staff during the assessment? No   Prior to leaving the hospital or emergency department was Durable Medical Equipment (DME), medical supplies, or infusions arranged for you? N/A   Are DME/medical supply/infusions needs identified by staff during this assessment? No   Did any of your medications change, during or after your hospital stay or ED visit? Yes   Do you have your new or updated medications? No   Do you understand what your medications are for and possible side effects? Yes   Are there any reasons that keep you from taking your medication as prescribed? No   Any concerns about medication refills? Yes  (Will run out of pregabalin tomorrow)   Were you given a different diet per your Discharge Instructions? No   Reason n/a   Do you have any questions or concerns that have not been discussed? No

## 2025-07-23 NOTE — PROGRESS NOTES
Per FER request :    Transitions of Care team has contacted patient and patient needs additional appointments.  Please contact patient for assistance with scheduling a follow-up appointment for Neurology.  Thank you!     Please call daughter Maddy @ [phone] to coordinate.  Prefers neurologist in [place].     Call [facility], [facility] (Neurosurgery); choose option 1 for general neurology     [address] [place] [address]  [address] [address] [phone]  Call  choose option 1 for general neurology        Attempt #1:   Left message on voicemail for patient's daughter Maddy to call transitions specialist back to schedule follow up appointments. Provided Transitions specialist scheduling phone number [phone].

## 2025-07-23 NOTE — TELEPHONE ENCOUNTER
Spoke with daughter Maddy for Transitions of Care navigation call.  Patient will be out of pregabalin tomorrow.  Requesting 90 day supply to be sent to [facility] with additional refills if appropriate. Thank you!!      LOV 5/2025  Next appointment 7/29/25 emergency room follow up         [id number] [place], [place] - [address] AT [facility] [address], [phone], [phone]        Disp Refills Start End    pregabalin 100 MG Oral Cap 60 capsule 0 6/20/2025 --    Sig - Route: Take 1 capsule (100 mg total) by mouth 2 (two) times daily. - Oral

## 2025-07-23 NOTE — PROGRESS NOTES
Transitions of Care team has contacted patient and patient needs additional appointments.  Please contact patient for assistance with scheduling a follow-up appointment for Neurology.  Thank you!    Please call daughter Maddy @ [phone] to coordinate.  Prefers neurologist in [place].    Call [facility] (Neurosurgery); choose option 1 for general neurology

## 2025-07-23 NOTE — DISCHARGE INSTRUCTIONS
Return if worsening dizziness or if you develop any numbness or weakness to extremities or any change in vision or speech  Follow-up with neurology clinic call for appointment  Follow-up with your doctor  May take meclizine every 8 hours as needed for dizziness

## 2025-07-24 NOTE — PROGRESS NOTES
Per FER request :     Transitions of Care team has contacted patient and patient needs additional appointments.  Please contact patient for assistance with scheduling a follow-up appointment for Neurology.  Thank you!     Please call daughter Maddy @ [phone] to coordinate.  Prefers neurologist in [place].     Call [facility], [facility] (Neurosurgery); choose option 1 for general neurology      [address] [place] [address]  [address] [address] [phone]  Call  choose option 1 for general neurology          Attempt #2:  Left message on voicemail for patient's daughter Maddy to call transitions specialist back to schedule follow up appointments. Provided Transitions specialist scheduling phone number [phone].

## 2025-07-25 NOTE — PROGRESS NOTES
Per FER request :     Transitions of Care team has contacted patient and patient needs additional appointments.  Please contact patient for assistance with scheduling a follow-up appointment for Neurology.  Thank you!     Please call keshav Castañeda @ [phone] to coordinate.  Prefers neurologist in [place].     Call [facility], [facility] (Neurosurgery); choose option 1 for general neurology      [address] [place] [address]  [address] [address] [phone]  Call  choose option 1 for general neurology  Per FER Request / Unable to reach pt's POA (keshav Castañeda) after the 3rd attempt        Attempt #3:  Left message on voicemail for patient to call transitions specialist back to schedule follow up appointments. Provided Transitions specialist scheduling phone number [phone]. Closing encounter. Will re-open if patient returns call.

## (undated) DIAGNOSIS — G89.29 CHRONIC PAIN OF BOTH SHOULDERS: ICD-10-CM

## (undated) DIAGNOSIS — M79.10 MYALGIA: ICD-10-CM

## (undated) DIAGNOSIS — M48.061 LUMBAR FORAMINAL STENOSIS: ICD-10-CM

## (undated) DIAGNOSIS — M47.816 FACET SYNDROME, LUMBAR: ICD-10-CM

## (undated) DIAGNOSIS — M51.26 BULGE OF LUMBAR DISC WITHOUT MYELOPATHY: ICD-10-CM

## (undated) DIAGNOSIS — M54.59 LUMBAR TRIGGER POINT SYNDROME: ICD-10-CM

## (undated) DIAGNOSIS — M25.512 CHRONIC PAIN OF BOTH SHOULDERS: ICD-10-CM

## (undated) DIAGNOSIS — M54.59 MECHANICAL LOW BACK PAIN: ICD-10-CM

## (undated) DIAGNOSIS — M47.816 LUMBAR SPONDYLOSIS: ICD-10-CM

## (undated) DIAGNOSIS — M25.511 CHRONIC PAIN OF BOTH SHOULDERS: ICD-10-CM

## (undated) DIAGNOSIS — M99.9 BIOMECHANICAL LESION: ICD-10-CM

## (undated) DIAGNOSIS — M67.919 TENDINOPATHY OF ROTATOR CUFF, UNSPECIFIED LATERALITY: ICD-10-CM

## (undated) DIAGNOSIS — M51.37 DDD (DEGENERATIVE DISC DISEASE), LUMBOSACRAL: ICD-10-CM

## (undated) DEVICE — 12 ML SYRINGE LUER-LOCK TIP: Brand: MONOJECT

## (undated) DEVICE — 3M™ STERI-STRIP™ COMPOUND BENZOIN TINCTURE 40 BAGS/CARTON 4 CARTONS/CASE C1544: Brand: 3M™ STERI-STRIP™

## (undated) DEVICE — ENCORE® LATEX ACCLAIM SIZE 8, STERILE LATEX POWDER-FREE SURGICAL GLOVE: Brand: ENCORE

## (undated) DEVICE — ENCORE® LATEX MICRO SIZE 8, STERILE LATEX POWDER-FREE SURGICAL GLOVE: Brand: ENCORE

## (undated) DEVICE — AMBIENT SUPER TURBOVAC 90 IFS: Brand: COBLATION

## (undated) DEVICE — GOWN SURG AERO BLUE PERF LG

## (undated) DEVICE — ARTHROSCOPY: Brand: MEDLINE INDUSTRIES, INC.

## (undated) DEVICE — ABDOMINAL PAD: Brand: CURITY

## (undated) DEVICE — TUBING SET, GRAVITY, 4-SPIKE

## (undated) DEVICE — SUT VICRYL 4-0 P-3 J494G

## (undated) DEVICE — NEEDLE HPO 18GA 1.5IN ECLPS

## (undated) DEVICE — 6 ML SYRINGE LUER-LOCK TIP: Brand: MONOJECT

## (undated) DEVICE — GAMMEX® PI HYBRID SIZE 8, STERILE POWDER-FREE SURGICAL GLOVE, POLYISOPRENE AND NEOPRENE BLEND: Brand: GAMMEX

## (undated) DEVICE — BLADE SHVR 13CM 4MM EXCLBR

## (undated) DEVICE — SUCTION CANISTER, 3000CC,SAFELINER: Brand: DEROYAL

## (undated) DEVICE — 3M™ STERI-STRIP™ REINFORCED ADHESIVE SKIN CLOSURES, R1547, 1/2 IN X 4 IN (12 MM X 100 MM), 6 STRIPS/ENVELOPE: Brand: 3M™ STERI-STRIP™

## (undated) DEVICE — SOLUTION  .9 3000ML

## (undated) NOTE — LETTER
AUTHORIZATION FOR SURGICAL OPERATION OR OTHER PROCEDURE    1. I hereby authorize Dr. Adenike Barrientos, and [email or web address] staff assigned to my case to perform the following operation and/or procedure at the [place] [email or web address]:    ___R Knee Cortisone Injection ____________________________________________________________________________________________      _______________________________________________________________________________________________    2. My physician has explained the nature and purpose of the operation or other procedure, possible alternative methods of treatment, the risks involved, and the possibility of complication to me. I acknowledge that no guarantee has been made as to the result that may be obtained. 3.  I recognize that, during the course of this operation, or other procedure, unforseen conditions may necessitate additional or different procedure than those listed above. I, therefore, further authorize and request that the above named physician, his/her physician assistants or designees perform such procedures as are, in his/her professional opinion, necessary and desirable. 4.  Any tissue or organs removed in the operation or other procedure may be disposed of by and at the discretion of the [place] [email or web address] and [facility]. 5.  I understand that in the event of a medical emergency, I will be transported by local paramedics to [facility] or other hospital emergency department. 6.  I certify that I have read and fully understand the above consent to operation and/or other procedure. 7.  I acknowledge that my physician has explained sedation/analgesia administration to me including the risks and benefits. I consent to the administration of sedation/analgesia as may be necessary or desirable in the judgement of my physician.     Witness signature: ___________________________________________________ Date:  ______/______/_____                    Time: ________ A. M.  P.M. Patient Name:  ______________________________________________________  (please print)      Patient signature:  ___________________________________________________             Relationship to Patient:           []  Parent    Responsible person                          []  Spouse  In case of minor or                    [] Other  _____________   Incompetent name:  __________________________________________________                               (please print)      _____________      Responsible person  In case of minor or  Incompetent signature:  _______________________________________________    Statement of Physician  My signature below affirms that prior to the time of the procedure, I have explained to the patient and/or his/her guardian, the risks and benefits involved in the proposed treatment and any reasonable alternative to the proposed treatment. I have also explained the risks and benefits involved in the refusal of the proposed treatment and have answered the patient's questions.                         Date:  ______/______/_______  Provider                      Signature:  __________________________________________________________       Time:  ___________ A.M    P.M.

## (undated) NOTE — LETTER
AUTHORIZATION FOR SURGICAL OPERATION OR OTHER PROCEDURE    1. I hereby authorize Dr. Alex Behar and the [facility] staff assigned to my case to perform the following operation and/or procedure at the [facility]:    RIGH subacromial CSI     2.  My physician has explained the nature and purpose of the operation or other procedure, possible alternative methods of treatment, the risks involved, and the possibility of complication to me.  I acknowledge that no guarantee has been made as to the result that may be obtained.  3.  I recognize that, during the course of this operation, or other procedure, unforseen conditions may necessitate additional or different procedure than those listed above.  I, therefore, further authorize and request that the above named physician, his/her physician assistants or designees perform such procedures as are, in his/her professional opinion, necessary and desirable.  4.  Any tissue or organs removed in the operation or other procedure may be disposed of by and at the discretion of the [facility] staff and [facility].  5.  I understand that in the event of a medical emergency, I will be transported by local paramedics to [facility] or other hospital emergency department.  6.  I certify that I have read and fully understand the above consent to operation and/or other procedure.    7.  I acknowledge that my physician has explained sedation/analgesia administration to me including the risks and benefits.  I consent to the administration of sedation/analgesia as may be necessary or desirable in the judgement of my physician.    Witness signature: ___________________________________________________ Date:  ______/______/_____                    Time:  ________ A.M.  P.M.       Patient Name:  Lizbeth Matute  6/4/1945  [id number]         Patient signature:  ___________________________________________________        Statement of Physician  My signature  below affirms that prior to the time of the procedure, I have explained to the patient and/or his/her guardian, the risks and benefits involved in the proposed treatment and any reasonable alternative to the proposed treatment.  I have also explained the risks and benefits involved in the refusal of the proposed treatment and have answered the patient's questions.                        Date:  ______/______/_______  Provider                      Signature:  __________________________________________________________       Time:  ___________ A.M    P.M.

## (undated) NOTE — ED AVS SNAPSHOT
Dee Dee Flaherty   MRN: [id number]    Department:  [facility] Emergency Department   Date of Visit:  12/28/2018           Disclosure     Insurance plans vary and the physician(s) referred by the ER may not be covered by your plan.  Please contact within the next three months to obtain basic health screening including reassessment of your blood pressure.     IF THERE IS ANY CHANGE OR WORSENING OF YOUR CONDITION, CALL YOUR PRIMARY CARE PHYSICIAN AT ONCE OR RETURN IMMEDIATELY TO THE EMERGENCY DEPARTMEN

## (undated) NOTE — LETTER
A. Notifier: [employer]. Patient Name: Lizbeth Walker Identification Number: [id number]  Aviso anticipado de no cobertura al beneficiario (ABN, por claudia siglas   en inglés)   NOTA: Si Medicare no paga por los artículos o servicios a continuación, usted podría tener que pagar. Medicare no paga por todo, incluidos algunos cuidados que usted o payne proveedor de atención médica entienda que son necesarios. Se anticipa que Medicare no pague por los artículos o servicios a continuación.  D. Artículos o servicios  Right Subacromial injection  E. Motivo por el cual Medicare podría carlos eduardo el pago: F. Costo estimado   __ EKG ($87.00)  __ Pap smear ($101) __Pelvic/Breast ($147.00)  __ Ear Irrigation ($138)  _x_ Injection(s)  ___ Tdap ($181)       ___ Meningitis ($290)   __Prevnar ($555)  ___ Td ($66)              ___ Prevnar 20 ($549)  ___ Hep A ($152)     ___ Prolia ($1827)         __ Xiaflex ($         )   ___ Hep B ($150)     ___ Pneumovax($287)                                     ___ Vaccine Administration ($65)   __ Medicare no se cubre jose david artículo o servicio      __ No se cubre jose david artículo o servicio para payne condición     __ Es posible que Medicare no pague por jose david artículo o servicio con tanta frecuencia        LO QUE USTED DEBE HACER AHORA:  Nichol jose david aviso para poder meredith juvenal decisión informada sobre claudia cuidados.  Háganos las preguntas que tenga después de terminar de leer.  Elija juvenal opción a continuación sobre si recibirá los artículos o servicios que se indica arriba.       Nota: Si elige Opción 1 o 2, podríamos ayudarle a utilizar los otros seguros que tenga,        law Medicare no nos puede obligar a hacer esto.  G. Opciones: Wil solamente juvenal dale. No podemos elegir la dale para usted.    OPCIÓN 1. Quiero los artículos o servicios que se indica arriba. Pudiera pedir el pago ahora, law yo también solicito que se facture a Medicare para obtener juvenal decisión oficial  respecto al pago, la cual me será enviada en un Resumen de Medicare (MSN, por claudia siglas en inglés). Entiendo que, si Medicare no paga, yo seré responsable del pago, law puedo apelar a Medicare según las indicaciones en el MSN. Si Medicare pagará, me serán reembolsados todos los pagos que yo haya hecho, maciej los copagos o deducibles.   OPCIÓN 2. Quiero los artículos o servicios que se indica arriba, law no  facture a Medicare. Se podrá pedir el pago ahora, ya que yo soy responsable del pago. No podré apelar si no se facturara a Medicare.   OPCIÓN 3. No quiero los artículos o servicios que se indica arriba. Entiendo que, con esta elección, no seré responsable del pago, y no podré apelar para saber si Medicare hubiera pagado.    H. Información adicional:   Carmen aviso explica nuestra opinión y no constituye juvenal decisión oficial de Medicare. Si usted tiene otras preguntas relativas a carmen aviso o la facturación de Medicare, llame al 1-800-MEDICARE (7-629-917-1750/TTY: 1-877-486-2048). Firme abajo para reconocer nito recibido y entendido carmen aviso. Usted también recibirá juvenal copia.  I. Firma:   MARK ANTHONY Fecha:       Tiene derecho a obtener información de Medicare en un formato accesible, raudel letra elvira, braille o audio. También tiene derecho a presentar juvenal queja si siente que ha sido discriminado. Visite Medicare.gov/about-us/nkedihokrtnza-sjcbcshkeqaoykldp-hgolkt.    De acuerdo con la Tammi para la Reducción de Trámites de 1995, ninguna persona será obligada a responder a juvenal recopilación de información a menos que se exhiba un número de control válido de la OMB. El número de control válido de la OMB para esta recopilación de información es [phone]. El tiempo necesario para completar esta recopilación de información es de aproximadamente 7 minutos por respuesta, incluido el tiempo para revisar las instrucciones, buscar bah de datos existentes, reunir los datos necesarios, y completar y revisar la recopilación de  información. Si tiene preguntas sobre la precisión del estimado de tiempo o sugerencias para mejorar jose david formulario, escriba a: CMS, [facility]     Security [place], Attn: [facility] Reports Clearance Officer, [place], [place] [address].    Formulario [id number] (Exp. 01/31/2026)                                      Formulario aprobado [facility] No. [id number]

## (undated) NOTE — LETTER
AUTHORIZATION FOR SURGICAL OPERATION OR OTHER PROCEDURE    1. I hereby authorize Dr. Natasha Moreira and the [facility] staff assigned to my case to perform the following operation and/or procedure at the [facility]:    BILATERAL knee CSI under ultrasound guidance     2. My physician has explained the nature and purpose of the operation or other procedure, possible alternative methods of treatment, the risks involved, and the possibility of complication to me. I acknowledge that no guarantee has been made as to the result that may be obtained. 3.  I recognize that, during the course of this operation, or other procedure, unforseen conditions may necessitate additional or different procedure than those listed above. I, therefore, further authorize and request that the above named physician, his/her physician assistants or designees perform such procedures as are, in his/her professional opinion, necessary and desirable. 4.  Any tissue or organs removed in the operation or other procedure may be disposed of by and at the discretion of the [facility] staff and [facility] AT [facility]. 5.  I understand that in the event of a medical emergency, I will be transported by local paramedics to [facility] or other hospital emergency department. 6.  I certify that I have read and fully understand the above consent to operation and/or other procedure. 7.  I acknowledge that my physician has explained sedation/analgesia administration to me including the risks and benefits. I consent to the administration of sedation/analgesia as may be necessary or desirable in the judgement of my physician. Witness signature: ___________________________________________________ Date:  ______/______/_____                    Time:  ________ A. M.  P.M.        Patient Name: Peter Louis  6/4/1945  [id number]         Patient signature:  ___________________________________________________               Statement of Physician  My signature below affirms that prior to the time of the procedure, I have explained to the patient and/or his/her guardian, the risks and benefits involved in the proposed treatment and any reasonable alternative to the proposed treatment. I have also explained the risks and benefits involved in the refusal of the proposed treatment and have answered the patient's questions.                         Date:  ______/______/_______  Provider                      Signature:  __________________________________________________________       Time:  ___________ A.M    P.M.

## (undated) NOTE — ED AVS SNAPSHOT
Denise Quezada   MRN: [id number]    Department:  [facility] Emergency Department   Date of Visit:  11/27/2018           Disclosure     Insurance plans vary and the physician(s) referred by the ER may not be covered by your plan.  Please contact within the next three months to obtain basic health screening including reassessment of your blood pressure.     IF THERE IS ANY CHANGE OR WORSENING OF YOUR CONDITION, CALL YOUR PRIMARY CARE PHYSICIAN AT ONCE OR RETURN IMMEDIATELY TO THE EMERGENCY DEPARTMEN

## (undated) NOTE — Clinical Note
TCM assessment completed.  The patient is scheduled for a TCM/HFU with you on 05/30/2024.  Thank you.

## (undated) NOTE — LETTER
AUTHORIZATION FOR SURGICAL OPERATION OR OTHER PROCEDURE    1. I hereby authorize Dr. Skyler Yepez and the [facility] staff assigned to my case to perform the following operation and/or procedure at the [facility]:     RIGHT pes anserine bursa CSI      2. My physician has explained the nature and purpose of the operation or other procedure, possible alternative methods of treatment, the risks involved, and the possibility of complication to me. I acknowledge that no guarantee has been made as to the result that may be obtained. 3.  I recognize that, during the course of this operation, or other procedure, unforseen conditions may necessitate additional or different procedure than those listed above. I, therefore, further authorize and request that the above named physician, his/her physician assistants or designees perform such procedures as are, in his/her professional opinion, necessary and desirable. 4.  Any tissue or organs removed in the operation or other procedure may be disposed of by and at the discretion of the [facility] staff and [facility] AT [facility]. 5.  I understand that in the event of a medical emergency, I will be transported by local paramedics to [facility] or other hospital emergency department. 6.  I certify that I have read and fully understand the above consent to operation and/or other procedure. 7.  I acknowledge that my physician has explained sedation/analgesia administration to me including the risks and benefits. I consent to the administration of sedation/analgesia as may be necessary or desirable in the judgement of my physician. Witness signature: ___________________________________________________ Date:  ______/______/_____                    Time:  ________ A. M.  P.M.        Patient Name:  Virginia Oliveira  6/4/1945  [id number]       Patient signature:  ___________________________________________________               Statement of Physician  My signature below affirms that prior to the time of the procedure, I have explained to the patient and/or his/her guardian, the risks and benefits involved in the proposed treatment and any reasonable alternative to the proposed treatment. I have also explained the risks and benefits involved in the refusal of the proposed treatment and have answered the patient's questions.                         Date:  ______/______/_______  Provider                      Signature:  __________________________________________________________       Time:  ___________ A.M    P.M.

## (undated) NOTE — LETTER
[facility] SURGERY SCHEDULING FORM   [address] [facility]   [phone] (scheduling phone) [phone] (scheduling fax)     PATIENT INFORMATION   Last Name:      Herberth Simmons      First Name: [x]  Yes  []  No or using our own   Allergies: Latex         Completed by:    Alberto Carlos      Date:    10/29/2020

## (undated) NOTE — LETTER
AUTHORIZATION FOR SURGICAL OPERATION OR OTHER PROCEDURE    1.  I hereby authorize Dr. Roberto Gotti, and [facility] staff assigned to my case to perform the following operation and/or procedure at the [facility]:    ____________________________ ______________________maria nancy baron ______________________  (please print)      Patient signature:  ___________________________________________________             Relationship to Patient:           []  Parent    Responsible person

## (undated) NOTE — LETTER
AUTHORIZATION FOR SURGICAL OPERATION OR OTHER PROCEDURE    1. I hereby authorize Dr. Rodney Billings and the [facility] staff assigned to my case to perform the following operation and/or procedure at the [facility]:    Bilateral knee CSI under ultrasound guidance    2. My physician has explained the nature and purpose of the operation or other procedure, possible alternative methods of treatment, the risks involved, and the possibility of complication to me. I acknowledge that no guarantee has been made as to the result that may be obtained. 3.  I recognize that, during the course of this operation, or other procedure, unforseen conditions may necessitate additional or different procedure than those listed above. I, therefore, further authorize and request that the above named physician, his/her physician assistants or designees perform such procedures as are, in his/her professional opinion, necessary and desirable. 4.  Any tissue or organs removed in the operation or other procedure may be disposed of by and at the discretion of the [facility] staff and [facility]. 5.  I understand that in the event of a medical emergency, I will be transported by local paramedics to [facility] or other hospital emergency department. 6.  I certify that I have read and fully understand the above consent to operation and/or other procedure. 7.  I acknowledge that my physician has explained sedation/analgesia administration to me including the risks and benefits. I consent to the administration of sedation/analgesia as may be necessary or desirable in the judgement of my physician. Witness signature: ___________________________________________________ Date:  ______/______/_____                    Time:  ________ A. M.  P.M.        Patient Name: David Urena  6/4/1945  [id number]       Patient signature:  ___________________________________________________          Statement of Physician  My signature below affirms that prior to the time of the procedure, I have explained to the patient and/or his/her guardian, the risks and benefits involved in the proposed treatment and any reasonable alternative to the proposed treatment. I have also explained the risks and benefits involved in the refusal of the proposed treatment and have answered the patient's questions.                         Date:  ______/______/_______  Provider                      Signature:  __________________________________________________________       Time:  ___________ A.M    P.M.

## (undated) NOTE — LETTER
AUTHORIZATION FOR SURGICAL OPERATION OR OTHER PROCEDURE    1. I hereby authorize Dr. Suni Quintero and the [facility] staff assigned to my case to perform the following operation and/or procedure at the [facility]:    RIGHT knee Durolane and CSI under ultrasound guidance     2. My physician has explained the nature and purpose of the operation or other procedure, possible alternative methods of treatment, the risks involved, and the possibility of complication to me. I acknowledge that no guarantee has been made as to the result that may be obtained. 3.  I recognize that, during the course of this operation, or other procedure, unforseen conditions may necessitate additional or different procedure than those listed above. I, therefore, further authorize and request that the above named physician, his/her physician assistants or designees perform such procedures as are, in his/her professional opinion, necessary and desirable. 4.  Any tissue or organs removed in the operation or other procedure may be disposed of by and at the discretion of the [facility] staff and [facility] AT [facility]. 5.  I understand that in the event of a medical emergency, I will be transported by local paramedics to [facility] or other hospital emergency department. 6.  I certify that I have read and fully understand the above consent to operation and/or other procedure. 7.  I acknowledge that my physician has explained sedation/analgesia administration to me including the risks and benefits. I consent to the administration of sedation/analgesia as may be necessary or desirable in the judgement of my physician. Witness signature: ___________________________________________________ Date:  ______/______/_____                    Time:  ________ A. M.  P.M.        Patient Name:  Brayden Penaloza  6/4/1945  [id number]         Patient signature: ___________________________________________________             Relationship to Patient:           []  Parent    Responsible person                          []  Spouse  In case of minor or                    [] Other  _____________   Incompetent name:  __________________________________________________                               (please print)      _____________      Responsible person  In case of minor or  Incompetent signature:  _______________________________________________    Statement of Physician  My signature below affirms that prior to the time of the procedure, I have explained to the patient and/or his/her guardian, the risks and benefits involved in the proposed treatment and any reasonable alternative to the proposed treatment. I have also explained the risks and benefits involved in the refusal of the proposed treatment and have answered the patient's questions.                         Date:  ______/______/_______  Provider                      Signature:  __________________________________________________________       Time:  ___________ A.M    P.M.

## (undated) NOTE — Clinical Note
Initial assessment completed with patient.  Appointment scheduled for 11/26 w/nurse practitioner.  Patient agrees to additional follow-up calls from nurse care manager.  Thank you!

## (undated) NOTE — LETTER
A. Notifier: [employer]. Patient Name: Lizbeth Walker Identification Number: [id number]  Aviso anticipado de no cobertura al beneficiario (ABN, por claudia siglas   en inglés)   NOTA: Si Medicare no paga por los artículos o servicios a continuación, usted podría tener que pagar. Medicare no paga por todo, incluidos algunos cuidados que usted o payne proveedor de atención médica entienda que son necesarios. Se anticipa que Medicare no pague por los artículos o servicios a continuación.  D. Artículos o servicios  Right subacromial corticosteroid injection  E. Motivo por el cual Medicare podría carlos eduardo el pago: F. Costo estimado   __ EKG ($87.00)  __ Pap smear ($101) __Pelvic/Breast ($147.00)  __ Ear Irrigation ($138)  _x_ Injection(s)  ___ Tdap ($181)       ___ Meningitis ($290)   __Prevnar ($555)  ___ Td ($66)              ___ Prevnar 20 ($549)  ___ Hep A ($152)     ___ Prolia ($1827)         __ Xiaflex ($         )   ___ Hep B ($150)     ___ Pneumovax($287)                                     ___ Vaccine Administration ($65)   __ Medicare no se cubre jose david artículo o servicio      __ No se cubre jose david artículo o servicio para payne condición     __ Es posible que Medicare no pague por jose david artículo o servicio con tanta frecuencia        LO QUE USTED DEBE HACER AHORA:  Nichol jose david aviso para poder meredith juvenal decisión informada sobre claudia cuidados.  Háganos las preguntas que tenga después de terminar de leer.  Elija juvenal opción a continuación sobre si recibirá los artículos o servicios que se indica arriba.       Nota: Si elige Opción 1 o 2, podríamos ayudarle a utilizar los otros seguros que tenga,        law Medicare no nos puede obligar a hacer esto.  G. Opciones: Wil solamente juvenal dale. No podemos elegir la dale para usted.    OPCIÓN 1. Quiero los artículos o servicios que se indica arriba. Pudiera pedir el pago ahora, law yo también solicito que se facture a Medicare para obtener juvenal  decisión oficial respecto al pago, la cual me será enviada en un Resumen de Medicare (MSN, por claudia siglas en inglés). Entiendo que, si Medicare no paga, yo seré responsable del pago, law puedo apelar a Medicare según las indicaciones en el MSN. Si Medicare pagará, me serán reembolsados todos los pagos que yo haya hecho, maciej los copagos o deducibles.   OPCIÓN 2. Quiero los artículos o servicios que se indica arriba, law no  facture a Medicare. Se podrá pedir el pago ahora, ya que yo soy responsable del pago. No podré apelar si no se facturara a Medicare.   OPCIÓN 3. No quiero los artículos o servicios que se indica arriba. Entiendo que, con esta elección, no seré responsable del pago, y no podré apelar para saber si Medicare hubiera pagado.    H. Información adicional:   Carmen aviso explica nuestra opinión y no constituye juvenal decisión oficial de Medicare. Si usted tiene otras preguntas relativas a carmen aviso o la facturación de Medicare, llame al 1-800-MEDICARE (6-747-117-3675/TTY: 1-877-486-2048). Firme abajo para reconocer nito recibido y entendido carmen aviso. Usted también recibirá juvenal copia.  IKaur Viera:   MARK ANTHONY Fecqasim:       Tiene derecho a obtener información de Medicare en un formato accesible, raudel letra elvira, braille o audio. También tiene derecho a presentar juvenal queja si siente que ha sido discriminado. Visite Medicare.gov/about-us/nqnqbblajgcqi-bazhyvnwckanckfyj-pwmqlm.    De acuerdo con la [employer] para la Reducción de Trámites de 1995, ninguna persona será obligada a responder a juvenal recopilación de información a menos que se exhiba un número de control válido de la OMB. El número de control válido de la OMB para esta recopilación de información es [phone]. El tiempo necesario para completar esta recopilación de información es de aproximadamente 7 minutos por respuesta, incluido el tiempo para revisar las instrucciones, buscar bah de datos existentes, reunir los datos necesarios, y completar y revisar la  recopilación de información. Si tiene preguntas sobre la precisión del estimado de tiempo o sugerencias para mejorar jose david formulario, escriba a: CMS, [facility]     Security [place], Attn: [facility] Reports Clearance Officer, [place], [place] [address].    Formulario [id number] (Exp. 01/31/2026)                                      Formulario aprobado [facility] No. [id number]

## (undated) NOTE — LETTER
2/5/2020    Monisha Moreno        [address] [facility]        [place]            Dear Monisha Moreno,      Our records indicate that you are due for an appointment for a Colonoscopy in March 2020, or shortly there after, with Nalini Villa MD.

## (undated) NOTE — LETTER
December 13, 2023      No Recipients     Patient: Vernon Limon   YOB: 1945   Date of Visit: 12/13/2023       Dear Dr. Andrea Berman Recipients: Thank you for referring Thien Headley to me for evaluation. Here is my assessment and plan of care:    Vernon Limon is a 69year old female. HPI:     HPI    [facility] patient [facility]. Patient complains of occasional itching OU. She also complains blurry vision and difficulty reading small print. Patient feels pain around her prosthesis and scratching behind it. She has not seen an ocularists for about 14 years. She feels like she may need a new prosthesis. Her last eye exam was about 2 years ago. Patient has a prosthesis OS due a work injury 50 years ago. Last edited by Kin Figueroa OT on 12/13/2023  4:51 PM.        Patient History:  Past Medical History:   Diagnosis Date    Arthritis     Rheumatoid Arthritis    Cancer ([facility]     lymphoma head    Dementia ([facility]     Depression     Disorder of thyroid     Esophageal reflux     Eye injury 1973    pt lost her left eye due to an injury at work, prosthesis OS    Fibromyalgia     Hearing difficulty     Hearing impairment     hearing aids left    High blood pressure     Hyperlipidemia     Hypertension     Osteoporosis     Osteoporosis     TIA (transient ischemic attack)     Unspecified essential hypertension     Visual impairment     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 Family History   Problem Relation Age of Onset    Cancer Mother         uterine cancer    Arthritis Father         Rheumatoid    Other (Other) Maternal Grandmother     Other (Other) Maternal Grandfather     Other (Other) Paternal Grandmother     Other (Other) Paternal Grandfather     Cancer Cousin         breast cancer    Diabetes Neg     Glaucoma Neg     Macular degeneration Neg        Social History:   Social History     Socioeconomic History    Marital status:    Tobacco Use    Smoking status: Never     Passive exposure: Never    Smokeless tobacco: Never   Vaping Use    Vaping Use: Never used   Substance and Sexual Activity    Alcohol use: Not Currently     Alcohol/week: 0.0 standard drinks of alcohol    Drug use: No   Other Topics Concern    Caffeine Concern No     Comment: Coffee 1 cup daily    Sleep Concern No    Exercise Yes   Social History Narrative    The patient does not use an assistive device. .      The patient does live in a home with stairs. Medications:  Current Outpatient Medications   Medication Sig Dispense Refill    alendronate 70 MG Oral Tab Take 1 tablet (70 mg total) by mouth every 7 days. 12 tablet 3    cinacalcet 30 MG Oral Tab Take 0.5 tablets (15 mg total) by mouth daily. 45 tablet 3    donepezil 10 MG Oral Tab Take 1 tablet (10 mg total) by mouth every evening. 90 tablet 3    famotidine 20 MG Oral Tab Take 1 tablet (20 mg total) by mouth 2 (two) times daily. 90 tablet 3    lisinopril 20 MG Oral Tab Take 1 tablet (20 mg total) by mouth daily. 90 tablet 3    pregabalin 100 MG Oral Cap Take 1 capsule (100 mg total) by mouth 2 (two) times daily. 60 capsule 11    sertraline 100 MG Oral Tab Take 1 tablet (100 mg total) by mouth nightly. 90 tablet 3    diclofenac 50 MG Oral Tab EC Take 1 tablet (50 mg total) by mouth 2 (two) times daily as needed.  180 tablet 0    celecoxib 200 MG Oral Cap Take 1 capsule (200 mg total) by mouth 2 (two) times daily. 30 capsule 1    ZINC OR Use as directed in the mouth or throat. Allergies: Allergies   Allergen Reactions    Dust Mite Extract Runny nose    Latex RASH     itching    Latex ITCHING     If exposed for long time     Seasonal UNKNOWN       ROS:     ROS    Positive for: Eyes  Negative for: Constitutional, Gastrointestinal, Neurological, Skin, Genitourinary, Musculoskeletal, HENT, Endocrine, Cardiovascular, Respiratory, Psychiatric, Allergic/Imm, Heme/Lymph  Last edited by Puneet Reardon OT on 12/13/2023  4:30 PM.          PHYSICAL EXAM:     Base Eye Exam       Visual Acuity (Snellen - Linear)         Right Left    Dist cc 20/25 prosthesis    Near cc 20/20       Correction: Glasses              Tonometry (Icare, 4:46 PM)         Right Left    Pressure 10 X              Pupils    OD PERRL  OS prosthesis             Visual Fields         Left Right      Full    Restrictions Total superior temporal, inferior temporal, superior nasal, inferior nasal deficiencies               Neuro/Psych       Oriented x3:  Yes              Dilation       Right eye: 1.0% Mydriacyl and 2.5% Sylvester Synephrine @ 4:46 PM              Dilation #2       Right eye: 1.0% Mydriacyl and 2.5% Sylvester Synephrine @ 4:46 PM                  Slit Lamp and Fundus Exam       Slit Lamp Exam         Right Left    Lids/Lashes Dermatochalasis, Meibomian gland dysfunction Dermatochalasis, Meibomian gland dysfunction, matted eyelashes    Conjunctiva/Sclera Non-injected, no papillae or follicles prothesis in place, mucous on prothesis    Cornea 1+ Arcus x    Anterior Chamber Deep and quiet x    Iris Normal x    Lens 1+ Nuclear sclerosis, Nasal Trace Cortical cataract x    Vitreous Clear x              Fundus Exam         Right Left    Disc Good rim-difficult exam x    C/D Ratio 0.3 x    Macula Normal x    Vessels Normal x    Periphery Normal x                  Refraction       Wearing Rx         Sphere Cylinder Axis Add    Right -0.50 +1.00 040 +3.00    Left -0.50 +1.00 040 +3.00              Manifest Refraction         Sphere Cylinder [place] Dist [place] Add [place]    Right -0.50 +1.00 040 20/25+ +3.00 20/20    Left Balance                   Final Rx         Sphere Cylinder [place] Dist VA Add [place]    Right -0.50 +1.00 040 20/25+ +3.00 20/20    Left Balance           Type: Flat top bifocal                     ASSESSMENT/PLAN:     Diagnoses and Plan:     Eye globe prosthesis  Discussed that patient should see an  for the left eye prothesis. Itch of eye  Use over the counter Alaway or Zaditor twice daily as needed for itching/ allergy symptoms. Info given. Patient could also try over the counter Pataday once to twice per day depending on package instructions. Meibomian gland dysfunction (MGD) of both eyes  Patient was instructed to use warm compresses to the eyelids twice a day everyday. Instructions for warm compress use:   Patient should place wash compresses on both eyelids for 5 minutes every morning and every night. After 5 minutes of holding the warm compresses on the eyelids, patient should gently rub the eyelashes and then rinse thoroughly with warm water. Age-related nuclear cataract of right eye  Mild cataract in right eye; no treatment needed at this time. New glasses today; update as needed. Discussed that new prescription is only a slight change. No orders of the defined types were placed in this encounter. Meds This Visit:  Requested Prescriptions      No prescriptions requested or ordered in this encounter        Follow up instructions:  Return in about 1 year (around 12/13/2024) for Complete eye exam.    12/13/2023  Scribed by: Hardeep Owens MD        If you have questions, please do not hesitate to call me. I look forward to following Pillo Harrell along with you.     Sincerely,        Hardeep Owens MD        CC:   No Recipients    Document electronically generated by: Hardeep Owens MD

## (undated) NOTE — Clinical Note
Transition of Care outreach completed as patient failed to return outreach call for 7 day follow up call.

## (undated) NOTE — LETTER
Notifier: [facility]       Patient Name: Lizbeth Walker       Identification Number: [id number]                          Advance Beneficiary Notice of Noncoverage (ABN)   NOTE:  If Medicare doesn’t pay for D. Items/service(s) below, you may have to pay.  Medicare does not pay for everything, even some care that you or your health care provider have good reason to think you need. We expect Medicare may not pay for the D. items/service(s) below.  Items or Services Reason Medicare May Not Pay: Estimated Cost         RIGH subacromial CSI    __ Medicare does not cover this service      __ Medicare may not pay for this   item/service for your condition     __ Medicare may not pay for this item/service as often as this        WHAT YOU NEED TO DO NOW:  Read this notice, so you can make an informed decision about your care.  Ask us any questions that you may have after you finish reading.  Choose an option below about whether to receive the D. item/service(s)  listed above.  Note: If you choose Option 1 or 2, we may help you to use any other insurance that you might have, but Medicare cannot require us to do this.  OPTIONS: Check only one box.  We cannot choose a box for you.   OPTION 1. I want the D. item/service(s) listed above. You may ask to be paid now, but I also want Medicare billed for an official decision on payment, which is sent to me on a Medicare Summary Notice (MSN). I understand that if Medicare doesn’t pay, I am responsible for payment, but I can appeal to Medicare by following the directions on the MSN. If Medicare does pay, you will refund any payments I made to you, less co-pays or deductibles.  OPTION 2. I want the D. item/service(s) listed above, but do not bill Medicare. You may ask to be paid now as I am responsible for payment. I cannot appeal if Medicare is not billed.  OPTION 3. I don't want the D. item/service(s) listed above. I understand with this choice I am not responsible  for payment, and I cannot appeal to see if Medicare would pay.    H. Additional Information:    This notice gives our opinion, not an official Medicare decision. If you have other questions on this notice or Medicare billing, call 1-800-MEDICARE (1-454.240.9622/TTY: [phone]). Signing below means that you have received and understand this notice. You also receive a copy.  Signature: Date:       You have the right to get Medicare information in an accessible format, like large print, Braille, or audio. You also have the right to file a complaint if you feel you’ve been discriminated against. Visit Medicare.gov/about- us/jreywaixhokwo-dnjaswppwnlxxrpnx-isbecq.  According to the Paperwork Reduction Act of 1995, no persons are required to respond to a collection of information unless it displays a valid OMB control number. The valid OMB control number for this information collection is 2748-7017. The time required to complete this information collection is estimated to average 7 minutes per response, including the time to review instructions, search existing data resources, gather the data needed, and complete and review the information collection. If you have comments concerning the accuracy of the time estimate or suggestions for improving this form, please write to: CMS, [facility] [employer]     Nella Phippsn: TOSHAI Reports Clearance Officer, [place], [place] [address].  Form CMS-R-131 (Exp. 1/31/2026) Form Approved OMB No. 5231-1469

## (undated) NOTE — LETTER
AUTHORIZATION FOR SURGICAL OPERATION OR OTHER PROCEDURE    1. I hereby authorize Dr. Alex Behar and the [facility] staff assigned to my case to perform the following operation and/or procedure at the [facility]:    Right Subacromial injection     2.  My physician has explained the nature and purpose of the operation or other procedure, possible alternative methods of treatment, the risks involved, and the possibility of complication to me.  I acknowledge that no guarantee has been made as to the result that may be obtained.  3.  I recognize that, during the course of this operation, or other procedure, unforseen conditions may necessitate additional or different procedure than those listed above.  I, therefore, further authorize and request that the above named physician, his/her physician assistants or designees perform such procedures as are, in his/her professional opinion, necessary and desirable.  4.  Any tissue or organs removed in the operation or other procedure may be disposed of by and at the discretion of the [facility] staff and [facility].  5.  I understand that in the event of a medical emergency, I will be transported by local paramedics to [facility] or other hospital emergency department.  6.  I certify that I have read and fully understand the above consent to operation and/or other procedure.    7.  I acknowledge that my physician has explained sedation/analgesia administration to me including the risks and benefits.  I consent to the administration of sedation/analgesia as may be necessary or desirable in the judgement of my physician.    Witness signature: ___________________________________________________ Date:  ______/______/_____                    Time:  ________ A.M.  P.M.       Patient Name:    Lizbeth Matute  6/4/1945  [id number]     Patient signature:  ___________________________________________________               Statement of Physician  My  signature below affirms that prior to the time of the procedure, I have explained to the patient and/or his/her guardian, the risks and benefits involved in the proposed treatment and any reasonable alternative to the proposed treatment.  I have also explained the risks and benefits involved in the refusal of the proposed treatment and have answered the patient's questions.                        Date:  ______/______/_______  Provider                      Signature:  __________________________________________________________       Time:  ___________ A.M    P.M.